# Patient Record
Sex: MALE | Race: OTHER | URBAN - METROPOLITAN AREA
[De-identification: names, ages, dates, MRNs, and addresses within clinical notes are randomized per-mention and may not be internally consistent; named-entity substitution may affect disease eponyms.]

---

## 2023-09-29 ENCOUNTER — TELEPHONE (OUTPATIENT)
Dept: PULMONOLOGY | Age: 63
End: 2023-09-29

## 2023-09-29 NOTE — TELEPHONE ENCOUNTER
Note:    Outside referral from Cindi Hardwick for COPD, chronic hypoxia. Ref states O2 sats in the 80's for months. Unable to afford medications so never filled them. COPD untreated. Needs maging, referral and notes sent to scanning 9/27/23. Urgent sending to triage  I have reviewed referral notes, I do not see a CXR prior in [facility] or Care Everywhere. I have attempted to call patient x 2 and line does not ring. Need to attempt additional calls to patient.

## 2024-03-01 ENCOUNTER — HOSPITAL ENCOUNTER (INPATIENT)
Age: 64
LOS: 6 days | Discharge: HOME HEALTH | DRG: 189 | End: 2024-03-07
Attending: EMERGENCY MEDICINE | Admitting: INTERNAL MEDICINE
Payer: COMMERCIAL

## 2024-03-01 ENCOUNTER — APPOINTMENT (OUTPATIENT)
Dept: GENERAL RADIOLOGY | Age: 64
DRG: 189 | End: 2024-03-01
Payer: COMMERCIAL

## 2024-03-01 DIAGNOSIS — I50.33 ACUTE ON CHRONIC DIASTOLIC CONGESTIVE HEART FAILURE (HCC): ICD-10-CM

## 2024-03-01 DIAGNOSIS — J44.1 CHRONIC OBSTRUCTIVE PULMONARY DISEASE WITH ACUTE EXACERBATION (HCC): ICD-10-CM

## 2024-03-01 DIAGNOSIS — J96.01 ACUTE RESPIRATORY FAILURE WITH HYPOXIA AND HYPERCAPNIA (HCC): Primary | ICD-10-CM

## 2024-03-01 DIAGNOSIS — N17.9 AKI (ACUTE KIDNEY INJURY) (HCC): ICD-10-CM

## 2024-03-01 DIAGNOSIS — J40 BRONCHITIS: ICD-10-CM

## 2024-03-01 DIAGNOSIS — Z91.148 NONCOMPLIANCE WITH MEDICATION REGIMEN: ICD-10-CM

## 2024-03-01 DIAGNOSIS — J96.02 ACUTE RESPIRATORY FAILURE WITH HYPOXIA AND HYPERCAPNIA (HCC): Primary | ICD-10-CM

## 2024-03-01 PROBLEM — I50.9 ACUTE ON CHRONIC HEART FAILURE (HCC): Status: ACTIVE | Noted: 2024-03-01

## 2024-03-01 PROBLEM — G47.33 OSA (OBSTRUCTIVE SLEEP APNEA): Chronic | Status: ACTIVE | Noted: 2024-03-01

## 2024-03-01 PROBLEM — J44.9 COPD (CHRONIC OBSTRUCTIVE PULMONARY DISEASE) (HCC): Chronic | Status: ACTIVE | Noted: 2024-03-01

## 2024-03-01 PROBLEM — E66.9 OBESITY: Chronic | Status: ACTIVE | Noted: 2024-03-01

## 2024-03-01 PROBLEM — Z87.891 HISTORY OF TOBACCO ABUSE: Status: ACTIVE | Noted: 2024-03-01

## 2024-03-01 PROBLEM — D75.1 POLYCYTHEMIA: Chronic | Status: ACTIVE | Noted: 2024-03-01

## 2024-03-01 PROBLEM — R79.89 ELEVATED TROPONIN LEVEL NOT DUE MYOCARDIAL INFARCTION: Status: ACTIVE | Noted: 2024-03-01

## 2024-03-01 LAB
ANION GAP SERPL CALC-SCNC: 2 MMOL/L (ref 2–11)
ARTERIAL PATENCY WRIST A: POSITIVE
ARTERIAL PATENCY WRIST A: POSITIVE
BASE DEFICIT BLD-SCNC: 0.3 MMOL/L
BASE DEFICIT BLD-SCNC: 0.5 MMOL/L
BASOPHILS # BLD: 0.1 K/UL (ref 0–0.2)
BASOPHILS NFR BLD: 0 % (ref 0–2)
BDY SITE: ABNORMAL
BDY SITE: ABNORMAL
BUN SERPL-MCNC: 75 MG/DL (ref 8–23)
CALCIUM SERPL-MCNC: 8.9 MG/DL (ref 8.3–10.4)
CHLORIDE SERPL-SCNC: 100 MMOL/L (ref 103–113)
CO2 SERPL-SCNC: 32 MMOL/L (ref 21–32)
CREAT SERPL-MCNC: 2.1 MG/DL (ref 0.8–1.5)
DIFFERENTIAL METHOD BLD: ABNORMAL
EOSINOPHIL # BLD: 0.1 K/UL (ref 0–0.8)
EOSINOPHIL NFR BLD: 1 % (ref 0.5–7.8)
ERYTHROCYTE [DISTWIDTH] IN BLOOD BY AUTOMATED COUNT: 19.7 % (ref 11.9–14.6)
GAS FLOW.O2 O2 DELIVERY SYS: ABNORMAL
GAS FLOW.O2 O2 DELIVERY SYS: ABNORMAL
GLUCOSE SERPL-MCNC: 109 MG/DL (ref 65–100)
HCO3 BLD-SCNC: 30.1 MMOL/L (ref 22–26)
HCO3 BLD-SCNC: 30.3 MMOL/L (ref 22–26)
HCT VFR BLD AUTO: 61.3 % (ref 41.1–50.3)
HGB BLD-MCNC: 18.4 G/DL (ref 13.6–17.2)
IMM GRANULOCYTES # BLD AUTO: 0.1 K/UL (ref 0–0.5)
IMM GRANULOCYTES NFR BLD AUTO: 1 % (ref 0–5)
INSPIRATION.DURATION SETTING TIME VENT: 0.9 SEC
IPAP/PIP/HIGH PEEP: 19
LACTATE SERPL-SCNC: 1 MMOL/L (ref 0.4–2)
LYMPHOCYTES # BLD: 1.6 K/UL (ref 0.5–4.6)
LYMPHOCYTES NFR BLD: 11 % (ref 13–44)
MAGNESIUM SERPL-MCNC: 2.5 MG/DL (ref 1.8–2.4)
MCH RBC QN AUTO: 28.4 PG (ref 26.1–32.9)
MCHC RBC AUTO-ENTMCNC: 30 G/DL (ref 31.4–35)
MCV RBC AUTO: 94.7 FL (ref 82–102)
MONOCYTES # BLD: 1.4 K/UL (ref 0.1–1.3)
MONOCYTES NFR BLD: 10 % (ref 4–12)
NEUTS SEG # BLD: 10.9 K/UL (ref 1.7–8.2)
NEUTS SEG NFR BLD: 77 % (ref 43–78)
NRBC # BLD: 0.03 K/UL (ref 0–0.2)
NT PRO BNP: 2536 PG/ML (ref 5–125)
O2/TOTAL GAS SETTING VFR VENT: 60 %
O2/TOTAL GAS SETTING VFR VENT: 80 %
PCO2 BLD: 67.8 MMHG (ref 35–45)
PCO2 BLD: 70 MMHG (ref 35–45)
PH BLD: 7.25 (ref 7.35–7.45)
PH BLD: 7.25 (ref 7.35–7.45)
PLATELET # BLD AUTO: 408 K/UL (ref 150–450)
PMV BLD AUTO: 9.3 FL (ref 9.4–12.3)
PO2 BLD: 80 MMHG (ref 75–100)
PO2 BLD: 85 MMHG (ref 75–100)
POTASSIUM SERPL-SCNC: 4.9 MMOL/L (ref 3.5–5.1)
RBC # BLD AUTO: 6.47 M/UL (ref 4.23–5.6)
RESPIRATORY RATE, POC: 20 (ref 5–40)
RESPIRATORY RATE, POC: 22 (ref 5–40)
SAO2 % BLD: 92.7 % (ref 95–98)
SAO2 % BLD: 94.1 % (ref 95–98)
SERVICE CMNT-IMP: ABNORMAL
SODIUM SERPL-SCNC: 134 MMOL/L (ref 136–146)
SPECIMEN TYPE: ABNORMAL
SPECIMEN TYPE: ABNORMAL
TROPONIN I SERPL HS-MCNC: 84.9 PG/ML (ref 0–14)
TROPONIN I SERPL HS-MCNC: 88.1 PG/ML (ref 0–14)
WBC # BLD AUTO: 14.1 K/UL (ref 4.3–11.1)

## 2024-03-01 PROCEDURE — 2000000000 HC ICU R&B

## 2024-03-01 PROCEDURE — 96374 THER/PROPH/DIAG INJ IV PUSH: CPT

## 2024-03-01 PROCEDURE — 94761 N-INVAS EAR/PLS OXIMETRY MLT: CPT

## 2024-03-01 PROCEDURE — 87040 BLOOD CULTURE FOR BACTERIA: CPT

## 2024-03-01 PROCEDURE — 85025 COMPLETE CBC W/AUTO DIFF WBC: CPT

## 2024-03-01 PROCEDURE — 83880 ASSAY OF NATRIURETIC PEPTIDE: CPT

## 2024-03-01 PROCEDURE — 36415 COLL VENOUS BLD VENIPUNCTURE: CPT

## 2024-03-01 PROCEDURE — 94760 N-INVAS EAR/PLS OXIMETRY 1: CPT

## 2024-03-01 PROCEDURE — 83735 ASSAY OF MAGNESIUM: CPT

## 2024-03-01 PROCEDURE — 82803 BLOOD GASES ANY COMBINATION: CPT

## 2024-03-01 PROCEDURE — 5A09457 ASSISTANCE WITH RESPIRATORY VENTILATION, 24-96 CONSECUTIVE HOURS, CONTINUOUS POSITIVE AIRWAY PRESSURE: ICD-10-PCS | Performed by: STUDENT IN AN ORGANIZED HEALTH CARE EDUCATION/TRAINING PROGRAM

## 2024-03-01 PROCEDURE — 84484 ASSAY OF TROPONIN QUANT: CPT

## 2024-03-01 PROCEDURE — 6370000000 HC RX 637 (ALT 250 FOR IP): Performed by: EMERGENCY MEDICINE

## 2024-03-01 PROCEDURE — 83605 ASSAY OF LACTIC ACID: CPT

## 2024-03-01 PROCEDURE — 2580000003 HC RX 258: Performed by: EMERGENCY MEDICINE

## 2024-03-01 PROCEDURE — 0DJ08ZZ INSPECTION OF UPPER INTESTINAL TRACT, VIA NATURAL OR ARTIFICIAL OPENING ENDOSCOPIC: ICD-10-PCS | Performed by: STUDENT IN AN ORGANIZED HEALTH CARE EDUCATION/TRAINING PROGRAM

## 2024-03-01 PROCEDURE — 94640 AIRWAY INHALATION TREATMENT: CPT

## 2024-03-01 PROCEDURE — 6370000000 HC RX 637 (ALT 250 FOR IP): Performed by: STUDENT IN AN ORGANIZED HEALTH CARE EDUCATION/TRAINING PROGRAM

## 2024-03-01 PROCEDURE — 80048 BASIC METABOLIC PNL TOTAL CA: CPT

## 2024-03-01 PROCEDURE — 94660 CPAP INITIATION&MGMT: CPT

## 2024-03-01 PROCEDURE — 99291 CRITICAL CARE FIRST HOUR: CPT | Performed by: INTERNAL MEDICINE

## 2024-03-01 PROCEDURE — 71045 X-RAY EXAM CHEST 1 VIEW: CPT

## 2024-03-01 PROCEDURE — 36600 WITHDRAWAL OF ARTERIAL BLOOD: CPT

## 2024-03-01 PROCEDURE — 99285 EMERGENCY DEPT VISIT HI MDM: CPT

## 2024-03-01 PROCEDURE — 6360000002 HC RX W HCPCS: Performed by: EMERGENCY MEDICINE

## 2024-03-01 PROCEDURE — 2580000003 HC RX 258: Performed by: STUDENT IN AN ORGANIZED HEALTH CARE EDUCATION/TRAINING PROGRAM

## 2024-03-01 PROCEDURE — 93005 ELECTROCARDIOGRAM TRACING: CPT | Performed by: EMERGENCY MEDICINE

## 2024-03-01 PROCEDURE — 6360000002 HC RX W HCPCS: Performed by: STUDENT IN AN ORGANIZED HEALTH CARE EDUCATION/TRAINING PROGRAM

## 2024-03-01 RX ORDER — FUROSEMIDE 10 MG/ML
60 INJECTION INTRAMUSCULAR; INTRAVENOUS ONCE
Status: COMPLETED | OUTPATIENT
Start: 2024-03-01 | End: 2024-03-01

## 2024-03-01 RX ORDER — FUROSEMIDE 10 MG/ML
40 INJECTION INTRAMUSCULAR; INTRAVENOUS 2 TIMES DAILY
Status: DISCONTINUED | OUTPATIENT
Start: 2024-03-02 | End: 2024-03-07 | Stop reason: HOSPADM

## 2024-03-01 RX ORDER — ENOXAPARIN SODIUM 100 MG/ML
40 INJECTION SUBCUTANEOUS 2 TIMES DAILY
Status: DISCONTINUED | OUTPATIENT
Start: 2024-03-01 | End: 2024-03-05

## 2024-03-01 RX ORDER — IPRATROPIUM BROMIDE AND ALBUTEROL SULFATE 2.5; .5 MG/3ML; MG/3ML
1 SOLUTION RESPIRATORY (INHALATION)
Status: COMPLETED | OUTPATIENT
Start: 2024-03-01 | End: 2024-03-01

## 2024-03-01 RX ORDER — AMLODIPINE BESYLATE 5 MG/1
5 TABLET ORAL DAILY
Status: ON HOLD | COMMUNITY
End: 2024-03-06 | Stop reason: HOSPADM

## 2024-03-01 RX ORDER — ASPIRIN 81 MG/1
81 TABLET ORAL DAILY
COMMUNITY

## 2024-03-01 RX ORDER — POLYETHYLENE GLYCOL 3350 17 G/17G
17 POWDER, FOR SOLUTION ORAL DAILY PRN
Status: DISCONTINUED | OUTPATIENT
Start: 2024-03-01 | End: 2024-03-07 | Stop reason: HOSPADM

## 2024-03-01 RX ORDER — SODIUM CHLORIDE 0.9 % (FLUSH) 0.9 %
5-40 SYRINGE (ML) INJECTION PRN
Status: DISCONTINUED | OUTPATIENT
Start: 2024-03-01 | End: 2024-03-07 | Stop reason: HOSPADM

## 2024-03-01 RX ORDER — ONDANSETRON 2 MG/ML
4 INJECTION INTRAMUSCULAR; INTRAVENOUS EVERY 6 HOURS PRN
Status: DISCONTINUED | OUTPATIENT
Start: 2024-03-01 | End: 2024-03-07 | Stop reason: HOSPADM

## 2024-03-01 RX ORDER — ONDANSETRON 4 MG/1
4 TABLET, ORALLY DISINTEGRATING ORAL EVERY 8 HOURS PRN
Status: DISCONTINUED | OUTPATIENT
Start: 2024-03-01 | End: 2024-03-07 | Stop reason: HOSPADM

## 2024-03-01 RX ORDER — BUDESONIDE 0.5 MG/2ML
0.5 INHALANT ORAL
Status: DISCONTINUED | OUTPATIENT
Start: 2024-03-01 | End: 2024-03-06

## 2024-03-01 RX ORDER — IPRATROPIUM BROMIDE AND ALBUTEROL SULFATE 2.5; .5 MG/3ML; MG/3ML
1 SOLUTION RESPIRATORY (INHALATION)
Status: DISCONTINUED | OUTPATIENT
Start: 2024-03-01 | End: 2024-03-04

## 2024-03-01 RX ORDER — SODIUM CHLORIDE 0.9 % (FLUSH) 0.9 %
5-40 SYRINGE (ML) INJECTION EVERY 12 HOURS SCHEDULED
Status: DISCONTINUED | OUTPATIENT
Start: 2024-03-01 | End: 2024-03-07 | Stop reason: HOSPADM

## 2024-03-01 RX ORDER — ACETAMINOPHEN 325 MG/1
650 TABLET ORAL EVERY 6 HOURS PRN
Status: DISCONTINUED | OUTPATIENT
Start: 2024-03-01 | End: 2024-03-07 | Stop reason: HOSPADM

## 2024-03-01 RX ORDER — ACETAMINOPHEN 650 MG/1
650 SUPPOSITORY RECTAL EVERY 6 HOURS PRN
Status: DISCONTINUED | OUTPATIENT
Start: 2024-03-01 | End: 2024-03-07 | Stop reason: HOSPADM

## 2024-03-01 RX ORDER — LOSARTAN POTASSIUM AND HYDROCHLOROTHIAZIDE 12.5; 5 MG/1; MG/1
1 TABLET ORAL DAILY
Status: ON HOLD | COMMUNITY
End: 2024-03-06 | Stop reason: HOSPADM

## 2024-03-01 RX ORDER — ASPIRIN 81 MG/1
324 TABLET, CHEWABLE ORAL ONCE
Status: COMPLETED | OUTPATIENT
Start: 2024-03-01 | End: 2024-03-01

## 2024-03-01 RX ORDER — POTASSIUM CHLORIDE 7.45 MG/ML
10 INJECTION INTRAVENOUS PRN
Status: DISCONTINUED | OUTPATIENT
Start: 2024-03-01 | End: 2024-03-05

## 2024-03-01 RX ORDER — MAGNESIUM SULFATE IN WATER 40 MG/ML
2000 INJECTION, SOLUTION INTRAVENOUS PRN
Status: DISCONTINUED | OUTPATIENT
Start: 2024-03-01 | End: 2024-03-05

## 2024-03-01 RX ORDER — POTASSIUM CHLORIDE 29.8 MG/ML
20 INJECTION INTRAVENOUS PRN
Status: DISCONTINUED | OUTPATIENT
Start: 2024-03-01 | End: 2024-03-05

## 2024-03-01 RX ADMIN — FUROSEMIDE 60 MG: 10 INJECTION, SOLUTION INTRAMUSCULAR; INTRAVENOUS at 16:42

## 2024-03-01 RX ADMIN — IPRATROPIUM BROMIDE AND ALBUTEROL SULFATE 1 DOSE: 2.5; .5 SOLUTION RESPIRATORY (INHALATION) at 20:02

## 2024-03-01 RX ADMIN — WATER 125 MG: 1 INJECTION INTRAMUSCULAR; INTRAVENOUS; SUBCUTANEOUS at 14:50

## 2024-03-01 RX ADMIN — WATER 1000 MG: 1 INJECTION INTRAMUSCULAR; INTRAVENOUS; SUBCUTANEOUS at 16:42

## 2024-03-01 RX ADMIN — IPRATROPIUM BROMIDE AND ALBUTEROL SULFATE 1 DOSE: 2.5; .5 SOLUTION RESPIRATORY (INHALATION) at 14:47

## 2024-03-01 RX ADMIN — ENOXAPARIN SODIUM 40 MG: 100 INJECTION SUBCUTANEOUS at 20:38

## 2024-03-01 RX ADMIN — SODIUM CHLORIDE, PRESERVATIVE FREE 10 ML: 5 INJECTION INTRAVENOUS at 20:39

## 2024-03-01 RX ADMIN — ASPIRIN 81 MG 324 MG: 81 TABLET ORAL at 14:48

## 2024-03-01 ASSESSMENT — PAIN SCALES - GENERAL
PAINLEVEL_OUTOF10: 0
PAINLEVEL_OUTOF10: 0

## 2024-03-01 ASSESSMENT — LIFESTYLE VARIABLES
HOW MANY STANDARD DRINKS CONTAINING ALCOHOL DO YOU HAVE ON A TYPICAL DAY: PATIENT DOES NOT DRINK
HOW OFTEN DO YOU HAVE A DRINK CONTAINING ALCOHOL: NEVER

## 2024-03-01 NOTE — H&P
HISTORY AND PHYSICAL  Tay Conklin  3/1/2024   Date of Admission:  3/1/2024    The patient's chart is reviewed and the patient is discussed with the staff.    Subjective:     Patient is a 63 y.o. male presents to ER via EMS from home with shortness of breath, edema, and altered mental status. He has a PMH of COPD, has not been treated and was supposed to see us as a new patient in January but did not show for appointment. He states he quit smoking 12 years ago and recently stopped drinking. He tells me he has been seeing [facility] and is supposed to be seeing pulmonary and cardiology. Has had CPAP to wear nightly for about 2 years but does not like it. He remains on BIPAP and is not consistent with providing history. Significant other and daughter at bedside. He states he was put on a couple different inhalers but they are very expensive.     Was started on Airvo but transitioned to BIPAP due to ABG with pH 7.25 and pCO2 70. Polycythemia seen on CBC, Hgb 18.4 likely chronically hypoxic. Significant other states his O2 sats are in the 60s at home sometimes and he is always sleepy. Reports a +20 lb weight gain in a month timeframe. He has anasarca with edema in BLE, scrotum, abdomen, face, and orbital. Significant other states he has had trouble urinating due to swelling in scrotum. proBNP was 2536. Significant other states he does have a history of CHF, no ECHO for review. Minimal documented history in EPIC for review.     Review of Systems:  Comprehensive ROS negative except in HPI    No current outpatient medications   No past medical history on file.  No past surgical history on file.  Social History     Socioeconomic History    Marital status:      Spouse name: Not on file    Number of children: Not on file    Years of education: Not on file    Highest education level: Not on file   Occupational History    Not on file

## 2024-03-01 NOTE — ED NOTES
Pt arrives via [facility] coming from home c/o SOB, edema, ascites that started in 1960 per patient.      Dong Lawrence, SHAWN  03/01/24 1474

## 2024-03-01 NOTE — ED PROVIDER NOTES
Emergency Department Provider Note       PCP: Malu Shaw   Age: 63 y.o.   Sex: male     DISPOSITION Admitted 03/01/2024 03:50:55 PM       ICD-10-CM    1. Acute respiratory failure with hypoxia and hypercapnia (HCC)  J96.01 Echo (TTE) complete (PRN contrast/bubble/strain/3D)    J96.02       2. Bronchitis  J40       3. DONYA (acute kidney injury) (HCC)  N17.9       4. Acute on chronic diastolic congestive heart failure (HCC)  I50.33 Echo (TTE) complete (PRN contrast/bubble/strain/3D)      5. Noncompliance with medication regimen  Z91.148           Medical Decision Making     DDX:    Acute exacerbation asthma, COPD, CHF, COVID-19, influenza    Bronchitis, aspiration pneumonia, pneumonia,    PE, rib fracture, rib dysfunction, pleurisy, pneumothorax, aspiration of foreign body   ED Course as of 03/01/24 1653   Fri Mar 01, 2024   1538 Dr. Boyer with the pulmonary intensivist group was consulted and will come and see the patient for his admission to the hospital secondary to hypoxic respiratory failure [KH]      ED Course User Index  [KH] Juan Aguirrestephen PEÑA, DO     1 or more acute illnesses that pose a threat to life or bodily function.   Shared medical decision making was utilized in creating the patients health plan today.    I independently ordered and reviewed each unique test.  I reviewed external records: provider visit note from PCP.  I reviewed external records: provider visit note from outside specialist.  I reviewed external records: previous EKG including cardiologist interpretation.    I reviewed external records: previous lab results from outside ED.  I reviewed external records: previous imaging study including radiologist interpretation.     I interpreted the X-rays no.  The patient was admitted and I have discussed patient management with the admitting provider.          History     Patient is a 63-year-old male presenting to the emerged part today complaining of somnolence and shortness of breath

## 2024-03-02 ENCOUNTER — APPOINTMENT (OUTPATIENT)
Dept: NON INVASIVE DIAGNOSTICS | Age: 64
DRG: 189 | End: 2024-03-02
Payer: COMMERCIAL

## 2024-03-02 LAB
ALBUMIN SERPL-MCNC: 3.1 G/DL (ref 3.2–4.6)
ALBUMIN/GLOB SERPL: 0.9 (ref 0.4–1.6)
ALP SERPL-CCNC: 63 U/L (ref 50–136)
ALT SERPL-CCNC: 54 U/L (ref 12–65)
ANION GAP SERPL CALC-SCNC: 6 MMOL/L (ref 2–11)
ARTERIAL PATENCY WRIST A: POSITIVE
AST SERPL-CCNC: 25 U/L (ref 15–37)
BASE EXCESS BLD CALC-SCNC: 1.5 MMOL/L
BASOPHILS # BLD: 0 K/UL (ref 0–0.2)
BASOPHILS NFR BLD: 0 % (ref 0–2)
BDY SITE: ABNORMAL
BILIRUB DIRECT SERPL-MCNC: 0.4 MG/DL
BILIRUB SERPL-MCNC: 1.2 MG/DL (ref 0.2–1.1)
BUN SERPL-MCNC: 68 MG/DL (ref 8–23)
CALCIUM SERPL-MCNC: 9 MG/DL (ref 8.3–10.4)
CHLORIDE SERPL-SCNC: 100 MMOL/L (ref 103–113)
CO2 SERPL-SCNC: 29 MMOL/L (ref 21–32)
CREAT SERPL-MCNC: 1.8 MG/DL (ref 0.8–1.5)
DIFFERENTIAL METHOD BLD: ABNORMAL
ECHO AV AREA PEAK VELOCITY: 2.9 CM2
ECHO AV AREA VTI: 3 CM2
ECHO AV AREA/BSA PEAK VELOCITY: 1 CM2/M2
ECHO AV AREA/BSA VTI: 1.1 CM2/M2
ECHO AV MEAN GRADIENT: 7 MMHG
ECHO AV MEAN VELOCITY: 1.2 M/S
ECHO AV PEAK GRADIENT: 12 MMHG
ECHO AV PEAK VELOCITY: 1.7 M/S
ECHO AV VELOCITY RATIO: 0.65
ECHO AV VTI: 28.6 CM
ECHO BSA: 2.91 M2
ECHO EST RA PRESSURE: 15 MMHG
ECHO IVC PROX: 3.4 CM
ECHO LA AREA 2C: 22.4 CM2
ECHO LA AREA 4C: 24.5 CM2
ECHO LA DIAMETER INDEX: 1.37 CM/M2
ECHO LA DIAMETER: 3.8 CM
ECHO LA MAJOR AXIS: 6.4 CM
ECHO LA MINOR AXIS: 5.8 CM
ECHO LA VOL BP: 75 ML (ref 18–58)
ECHO LA VOL MOD A2C: 70 ML (ref 18–58)
ECHO LA VOL MOD A4C: 74 ML (ref 18–58)
ECHO LA VOL/BSA BIPLANE: 27 ML/M2 (ref 16–34)
ECHO LA VOLUME INDEX MOD A2C: 25 ML/M2 (ref 16–34)
ECHO LA VOLUME INDEX MOD A4C: 27 ML/M2 (ref 16–34)
ECHO LV E' LATERAL VELOCITY: 13 CM/S
ECHO LV E' SEPTAL VELOCITY: 8 CM/S
ECHO LV EDV A4C: 146 ML
ECHO LV EDV INDEX A4C: 53 ML/M2
ECHO LV EJECTION FRACTION A4C: 58 %
ECHO LV ESV A4C: 61 ML
ECHO LV ESV INDEX A4C: 22 ML/M2
ECHO LV FRACTIONAL SHORTENING: 29 % (ref 28–44)
ECHO LV INTERNAL DIMENSION DIASTOLE INDEX: 2.12 CM/M2
ECHO LV INTERNAL DIMENSION DIASTOLIC: 5.9 CM (ref 4.2–5.9)
ECHO LV INTERNAL DIMENSION SYSTOLIC INDEX: 1.51 CM/M2
ECHO LV INTERNAL DIMENSION SYSTOLIC: 4.2 CM
ECHO LV IVSD: 0.9 CM (ref 0.6–1)
ECHO LV MASS 2D: 239.9 G (ref 88–224)
ECHO LV MASS INDEX 2D: 86.3 G/M2 (ref 49–115)
ECHO LV POSTERIOR WALL DIASTOLIC: 1.1 CM (ref 0.6–1)
ECHO LV RELATIVE WALL THICKNESS RATIO: 0.37
ECHO LVOT AREA: 4.5 CM2
ECHO LVOT AV VTI INDEX: 0.66
ECHO LVOT DIAM: 2.4 CM
ECHO LVOT MEAN GRADIENT: 3 MMHG
ECHO LVOT PEAK GRADIENT: 5 MMHG
ECHO LVOT PEAK VELOCITY: 1.1 M/S
ECHO LVOT STROKE VOLUME INDEX: 30.9 ML/M2
ECHO LVOT SV: 85.9 ML
ECHO LVOT VTI: 19 CM
ECHO MV A VELOCITY: 0.59 M/S
ECHO MV E DECELERATION TIME (DT): 190 MS
ECHO MV E VELOCITY: 0.56 M/S
ECHO MV E/A RATIO: 0.95
ECHO MV E/E' LATERAL: 4.31
ECHO MV E/E' RATIO (AVERAGED): 5.65
ECHO MV REGURGITANT PEAK GRADIENT: 108 MMHG
ECHO MV REGURGITANT PEAK VELOCITY: 5.2 M/S
ECHO PV MAX VELOCITY: 1.1 M/S
ECHO PV PEAK GRADIENT: 5 MMHG
ECHO RIGHT VENTRICULAR SYSTOLIC PRESSURE (RVSP): 75 MMHG
ECHO RV BASAL DIMENSION: 5.6 CM
ECHO TV REGURGITANT MAX VELOCITY: 3.86 M/S
ECHO TV REGURGITANT PEAK GRADIENT: 60 MMHG
EKG ATRIAL RATE: 91 BPM
EKG DIAGNOSIS: NORMAL
EKG P AXIS: 68 DEGREES
EKG P-R INTERVAL: 178 MS
EKG Q-T INTERVAL: 371 MS
EKG QRS DURATION: 111 MS
EKG QTC CALCULATION (BAZETT): 457 MS
EKG R AXIS: 94 DEGREES
EKG T AXIS: 55 DEGREES
EKG VENTRICULAR RATE: 91 BPM
EOSINOPHIL # BLD: 0 K/UL (ref 0–0.8)
EOSINOPHIL NFR BLD: 0 % (ref 0.5–7.8)
ERYTHROCYTE [DISTWIDTH] IN BLOOD BY AUTOMATED COUNT: 19.9 % (ref 11.9–14.6)
GAS FLOW.O2 O2 DELIVERY SYS: ABNORMAL
GLOBULIN SER CALC-MCNC: 3.5 G/DL (ref 2.8–4.5)
GLUCOSE SERPL-MCNC: 121 MG/DL (ref 65–100)
HCO3 BLD-SCNC: 33.9 MMOL/L (ref 22–26)
HCT VFR BLD AUTO: 65.4 % (ref 41.1–50.3)
HGB BLD-MCNC: 19.5 G/DL (ref 13.6–17.2)
IMM GRANULOCYTES # BLD AUTO: 0.1 K/UL (ref 0–0.5)
IMM GRANULOCYTES NFR BLD AUTO: 1 % (ref 0–5)
LYMPHOCYTES # BLD: 0.8 K/UL (ref 0.5–4.6)
LYMPHOCYTES NFR BLD: 9 % (ref 13–44)
MAGNESIUM SERPL-MCNC: 2.4 MG/DL (ref 1.8–2.4)
MCH RBC QN AUTO: 28.3 PG (ref 26.1–32.9)
MCHC RBC AUTO-ENTMCNC: 29.8 G/DL (ref 31.4–35)
MCV RBC AUTO: 95.1 FL (ref 82–102)
MONOCYTES # BLD: 0.6 K/UL (ref 0.1–1.3)
MONOCYTES NFR BLD: 6 % (ref 4–12)
NEUTS SEG # BLD: 8.2 K/UL (ref 1.7–8.2)
NEUTS SEG NFR BLD: 84 % (ref 43–78)
NRBC # BLD: 0.02 K/UL (ref 0–0.2)
O2/TOTAL GAS SETTING VFR VENT: 60 %
PCO2 BLD: 80.9 MMHG (ref 35–45)
PEEP RESPIRATORY: 10 CMH2O
PH BLD: 7.23 (ref 7.35–7.45)
PLATELET # BLD AUTO: 356 K/UL (ref 150–450)
PMV BLD AUTO: 9.2 FL (ref 9.4–12.3)
PO2 BLD: 76 MMHG (ref 75–100)
POTASSIUM SERPL-SCNC: 5 MMOL/L (ref 3.5–5.1)
PRESSURE SUPPORT SETTING VENT: 20 CMH2O
PROT SERPL-MCNC: 6.6 G/DL (ref 6.3–8.2)
RBC # BLD AUTO: 6.88 M/UL (ref 4.23–5.6)
RESPIRATORY RATE, POC: 22 (ref 5–40)
SAO2 % BLD: 91.2 % (ref 95–98)
SERVICE CMNT-IMP: ABNORMAL
SODIUM SERPL-SCNC: 135 MMOL/L (ref 136–146)
SPECIMEN TYPE: ABNORMAL
VENTILATION MODE VENT: ABNORMAL
WBC # BLD AUTO: 9.7 K/UL (ref 4.3–11.1)

## 2024-03-02 PROCEDURE — 6360000004 HC RX CONTRAST MEDICATION: Performed by: STUDENT IN AN ORGANIZED HEALTH CARE EDUCATION/TRAINING PROGRAM

## 2024-03-02 PROCEDURE — 94761 N-INVAS EAR/PLS OXIMETRY MLT: CPT

## 2024-03-02 PROCEDURE — 6360000002 HC RX W HCPCS: Performed by: STUDENT IN AN ORGANIZED HEALTH CARE EDUCATION/TRAINING PROGRAM

## 2024-03-02 PROCEDURE — 36600 WITHDRAWAL OF ARTERIAL BLOOD: CPT

## 2024-03-02 PROCEDURE — 85025 COMPLETE CBC W/AUTO DIFF WBC: CPT

## 2024-03-02 PROCEDURE — 94660 CPAP INITIATION&MGMT: CPT

## 2024-03-02 PROCEDURE — 83735 ASSAY OF MAGNESIUM: CPT

## 2024-03-02 PROCEDURE — 1100000003 HC PRIVATE W/ TELEMETRY

## 2024-03-02 PROCEDURE — 2000000000 HC ICU R&B

## 2024-03-02 PROCEDURE — 99291 CRITICAL CARE FIRST HOUR: CPT | Performed by: INTERNAL MEDICINE

## 2024-03-02 PROCEDURE — 80048 BASIC METABOLIC PNL TOTAL CA: CPT

## 2024-03-02 PROCEDURE — 36415 COLL VENOUS BLD VENIPUNCTURE: CPT

## 2024-03-02 PROCEDURE — 80076 HEPATIC FUNCTION PANEL: CPT

## 2024-03-02 PROCEDURE — C8929 TTE W OR WO FOL WCON,DOPPLER: HCPCS

## 2024-03-02 PROCEDURE — 2580000003 HC RX 258: Performed by: STUDENT IN AN ORGANIZED HEALTH CARE EDUCATION/TRAINING PROGRAM

## 2024-03-02 PROCEDURE — 93010 ELECTROCARDIOGRAM REPORT: CPT | Performed by: INTERNAL MEDICINE

## 2024-03-02 PROCEDURE — 82803 BLOOD GASES ANY COMBINATION: CPT

## 2024-03-02 PROCEDURE — C9113 INJ PANTOPRAZOLE SODIUM, VIA: HCPCS | Performed by: STUDENT IN AN ORGANIZED HEALTH CARE EDUCATION/TRAINING PROGRAM

## 2024-03-02 PROCEDURE — 2700000000 HC OXYGEN THERAPY PER DAY

## 2024-03-02 PROCEDURE — A4216 STERILE WATER/SALINE, 10 ML: HCPCS | Performed by: STUDENT IN AN ORGANIZED HEALTH CARE EDUCATION/TRAINING PROGRAM

## 2024-03-02 PROCEDURE — 94640 AIRWAY INHALATION TREATMENT: CPT

## 2024-03-02 PROCEDURE — 6370000000 HC RX 637 (ALT 250 FOR IP): Performed by: STUDENT IN AN ORGANIZED HEALTH CARE EDUCATION/TRAINING PROGRAM

## 2024-03-02 RX ADMIN — IPRATROPIUM BROMIDE AND ALBUTEROL SULFATE 1 DOSE: 2.5; .5 SOLUTION RESPIRATORY (INHALATION) at 07:00

## 2024-03-02 RX ADMIN — IPRATROPIUM BROMIDE AND ALBUTEROL SULFATE 1 DOSE: 2.5; .5 SOLUTION RESPIRATORY (INHALATION) at 11:53

## 2024-03-02 RX ADMIN — SODIUM CHLORIDE, PRESERVATIVE FREE 10 ML: 5 INJECTION INTRAVENOUS at 21:08

## 2024-03-02 RX ADMIN — BUDESONIDE 500 MCG: 0.5 INHALANT RESPIRATORY (INHALATION) at 07:59

## 2024-03-02 RX ADMIN — ENOXAPARIN SODIUM 40 MG: 100 INJECTION SUBCUTANEOUS at 09:21

## 2024-03-02 RX ADMIN — IPRATROPIUM BROMIDE AND ALBUTEROL SULFATE 1 DOSE: 2.5; .5 SOLUTION RESPIRATORY (INHALATION) at 19:42

## 2024-03-02 RX ADMIN — IPRATROPIUM BROMIDE AND ALBUTEROL SULFATE 1 DOSE: 2.5; .5 SOLUTION RESPIRATORY (INHALATION) at 00:12

## 2024-03-02 RX ADMIN — PANTOPRAZOLE SODIUM 40 MG: 40 INJECTION, POWDER, FOR SOLUTION INTRAVENOUS at 09:21

## 2024-03-02 RX ADMIN — IPRATROPIUM BROMIDE AND ALBUTEROL SULFATE 1 DOSE: 2.5; .5 SOLUTION RESPIRATORY (INHALATION) at 03:19

## 2024-03-02 RX ADMIN — FUROSEMIDE 40 MG: 10 INJECTION, SOLUTION INTRAMUSCULAR; INTRAVENOUS at 09:21

## 2024-03-02 RX ADMIN — WATER 40 MG: 1 INJECTION INTRAMUSCULAR; INTRAVENOUS; SUBCUTANEOUS at 18:05

## 2024-03-02 RX ADMIN — SODIUM CHLORIDE, PRESERVATIVE FREE 10 ML: 5 INJECTION INTRAVENOUS at 09:22

## 2024-03-02 RX ADMIN — SODIUM CHLORIDE, PRESERVATIVE FREE 0.6 ML: 5 INJECTION INTRAVENOUS at 09:25

## 2024-03-02 RX ADMIN — WATER 40 MG: 1 INJECTION INTRAMUSCULAR; INTRAVENOUS; SUBCUTANEOUS at 03:14

## 2024-03-02 RX ADMIN — IPRATROPIUM BROMIDE AND ALBUTEROL SULFATE 1 DOSE: 2.5; .5 SOLUTION RESPIRATORY (INHALATION) at 15:59

## 2024-03-02 RX ADMIN — BUDESONIDE 500 MCG: 0.5 INHALANT RESPIRATORY (INHALATION) at 19:42

## 2024-03-02 RX ADMIN — ENOXAPARIN SODIUM 40 MG: 100 INJECTION SUBCUTANEOUS at 21:08

## 2024-03-02 RX ADMIN — FUROSEMIDE 40 MG: 10 INJECTION, SOLUTION INTRAMUSCULAR; INTRAVENOUS at 18:06

## 2024-03-02 ASSESSMENT — PAIN SCALES - GENERAL
PAINLEVEL_OUTOF10: 0

## 2024-03-03 ENCOUNTER — APPOINTMENT (OUTPATIENT)
Dept: GENERAL RADIOLOGY | Age: 64
DRG: 189 | End: 2024-03-03
Payer: COMMERCIAL

## 2024-03-03 PROBLEM — K92.1 MELENA: Status: ACTIVE | Noted: 2024-03-03

## 2024-03-03 LAB
ANION GAP SERPL CALC-SCNC: 6 MMOL/L (ref 2–11)
BUN SERPL-MCNC: 63 MG/DL (ref 8–23)
CALCIUM SERPL-MCNC: 8.9 MG/DL (ref 8.3–10.4)
CHLORIDE SERPL-SCNC: 101 MMOL/L (ref 103–113)
CO2 SERPL-SCNC: 35 MMOL/L (ref 21–32)
FERRITIN SERPL-MCNC: 19 NG/ML (ref 8–388)
GLUCOSE SERPL-MCNC: 125 MG/DL (ref 65–100)
HCT VFR BLD AUTO: 39.5 % (ref 41.1–50.3)
HCT VFR BLD AUTO: 52.1 % (ref 41.1–50.3)
HEMOCCULT STL QL: POSITIVE
HGB BLD-MCNC: 12 G/DL (ref 13.6–17.2)
HGB BLD-MCNC: 15.9 G/DL (ref 13.6–17.2)
IRON SATN MFR SERPL: 6 %
IRON SERPL-MCNC: 26 UG/DL (ref 35–150)
MAGNESIUM SERPL-MCNC: 2.2 MG/DL (ref 1.8–2.4)
POTASSIUM SERPL-SCNC: 4.2 MMOL/L (ref 3.5–5.1)
SODIUM SERPL-SCNC: 142 MMOL/L (ref 136–146)
TIBC SERPL-MCNC: 417 UG/DL (ref 250–450)

## 2024-03-03 PROCEDURE — 6370000000 HC RX 637 (ALT 250 FOR IP): Performed by: INTERNAL MEDICINE

## 2024-03-03 PROCEDURE — 86850 RBC ANTIBODY SCREEN: CPT

## 2024-03-03 PROCEDURE — 85014 HEMATOCRIT: CPT

## 2024-03-03 PROCEDURE — 99233 SBSQ HOSP IP/OBS HIGH 50: CPT | Performed by: INTERNAL MEDICINE

## 2024-03-03 PROCEDURE — 2700000000 HC OXYGEN THERAPY PER DAY

## 2024-03-03 PROCEDURE — 71045 X-RAY EXAM CHEST 1 VIEW: CPT

## 2024-03-03 PROCEDURE — 36415 COLL VENOUS BLD VENIPUNCTURE: CPT

## 2024-03-03 PROCEDURE — A4216 STERILE WATER/SALINE, 10 ML: HCPCS | Performed by: FAMILY MEDICINE

## 2024-03-03 PROCEDURE — 83540 ASSAY OF IRON: CPT

## 2024-03-03 PROCEDURE — C9113 INJ PANTOPRAZOLE SODIUM, VIA: HCPCS | Performed by: STUDENT IN AN ORGANIZED HEALTH CARE EDUCATION/TRAINING PROGRAM

## 2024-03-03 PROCEDURE — 83735 ASSAY OF MAGNESIUM: CPT

## 2024-03-03 PROCEDURE — 94660 CPAP INITIATION&MGMT: CPT

## 2024-03-03 PROCEDURE — 86901 BLOOD TYPING SEROLOGIC RH(D): CPT

## 2024-03-03 PROCEDURE — 86900 BLOOD TYPING SEROLOGIC ABO: CPT

## 2024-03-03 PROCEDURE — 6360000002 HC RX W HCPCS: Performed by: FAMILY MEDICINE

## 2024-03-03 PROCEDURE — 80048 BASIC METABOLIC PNL TOTAL CA: CPT

## 2024-03-03 PROCEDURE — 6360000002 HC RX W HCPCS: Performed by: STUDENT IN AN ORGANIZED HEALTH CARE EDUCATION/TRAINING PROGRAM

## 2024-03-03 PROCEDURE — C9113 INJ PANTOPRAZOLE SODIUM, VIA: HCPCS | Performed by: FAMILY MEDICINE

## 2024-03-03 PROCEDURE — A4216 STERILE WATER/SALINE, 10 ML: HCPCS | Performed by: STUDENT IN AN ORGANIZED HEALTH CARE EDUCATION/TRAINING PROGRAM

## 2024-03-03 PROCEDURE — 2580000003 HC RX 258: Performed by: FAMILY MEDICINE

## 2024-03-03 PROCEDURE — 2580000003 HC RX 258: Performed by: STUDENT IN AN ORGANIZED HEALTH CARE EDUCATION/TRAINING PROGRAM

## 2024-03-03 PROCEDURE — 1100000003 HC PRIVATE W/ TELEMETRY

## 2024-03-03 PROCEDURE — 86923 COMPATIBILITY TEST ELECTRIC: CPT

## 2024-03-03 PROCEDURE — 83550 IRON BINDING TEST: CPT

## 2024-03-03 PROCEDURE — 82272 OCCULT BLD FECES 1-3 TESTS: CPT

## 2024-03-03 PROCEDURE — 82728 ASSAY OF FERRITIN: CPT

## 2024-03-03 PROCEDURE — 94640 AIRWAY INHALATION TREATMENT: CPT

## 2024-03-03 PROCEDURE — 85018 HEMOGLOBIN: CPT

## 2024-03-03 PROCEDURE — 6370000000 HC RX 637 (ALT 250 FOR IP): Performed by: STUDENT IN AN ORGANIZED HEALTH CARE EDUCATION/TRAINING PROGRAM

## 2024-03-03 PROCEDURE — 94761 N-INVAS EAR/PLS OXIMETRY MLT: CPT

## 2024-03-03 RX ORDER — FLUTICASONE PROPIONATE 50 MCG
2 SPRAY, SUSPENSION (ML) NASAL DAILY
Status: DISCONTINUED | OUTPATIENT
Start: 2024-03-03 | End: 2024-03-07 | Stop reason: HOSPADM

## 2024-03-03 RX ADMIN — SODIUM CHLORIDE 40 MG: 9 INJECTION INTRAMUSCULAR; INTRAVENOUS; SUBCUTANEOUS at 20:52

## 2024-03-03 RX ADMIN — FLUTICASONE PROPIONATE 2 SPRAY: 50 SPRAY, METERED NASAL at 11:48

## 2024-03-03 RX ADMIN — ENOXAPARIN SODIUM 40 MG: 100 INJECTION SUBCUTANEOUS at 10:22

## 2024-03-03 RX ADMIN — IPRATROPIUM BROMIDE AND ALBUTEROL SULFATE 1 DOSE: 2.5; .5 SOLUTION RESPIRATORY (INHALATION) at 04:30

## 2024-03-03 RX ADMIN — IPRATROPIUM BROMIDE AND ALBUTEROL SULFATE 1 DOSE: 2.5; .5 SOLUTION RESPIRATORY (INHALATION) at 07:30

## 2024-03-03 RX ADMIN — WATER 40 MG: 1 INJECTION INTRAMUSCULAR; INTRAVENOUS; SUBCUTANEOUS at 04:29

## 2024-03-03 RX ADMIN — SODIUM CHLORIDE, PRESERVATIVE FREE 10 ML: 5 INJECTION INTRAVENOUS at 20:55

## 2024-03-03 RX ADMIN — WATER 40 MG: 1 INJECTION INTRAMUSCULAR; INTRAVENOUS; SUBCUTANEOUS at 16:23

## 2024-03-03 RX ADMIN — BUDESONIDE 500 MCG: 0.5 INHALANT RESPIRATORY (INHALATION) at 19:40

## 2024-03-03 RX ADMIN — BUDESONIDE 500 MCG: 0.5 INHALANT RESPIRATORY (INHALATION) at 07:31

## 2024-03-03 RX ADMIN — IPRATROPIUM BROMIDE AND ALBUTEROL SULFATE 1 DOSE: 2.5; .5 SOLUTION RESPIRATORY (INHALATION) at 11:31

## 2024-03-03 RX ADMIN — PANTOPRAZOLE SODIUM 40 MG: 40 INJECTION, POWDER, FOR SOLUTION INTRAVENOUS at 10:22

## 2024-03-03 RX ADMIN — FUROSEMIDE 40 MG: 10 INJECTION, SOLUTION INTRAMUSCULAR; INTRAVENOUS at 10:22

## 2024-03-03 RX ADMIN — IPRATROPIUM BROMIDE AND ALBUTEROL SULFATE 1 DOSE: 2.5; .5 SOLUTION RESPIRATORY (INHALATION) at 19:39

## 2024-03-03 RX ADMIN — IPRATROPIUM BROMIDE AND ALBUTEROL SULFATE 1 DOSE: 2.5; .5 SOLUTION RESPIRATORY (INHALATION) at 00:05

## 2024-03-03 RX ADMIN — SODIUM CHLORIDE, PRESERVATIVE FREE 10 ML: 5 INJECTION INTRAVENOUS at 10:20

## 2024-03-03 ASSESSMENT — PAIN SCALES - GENERAL: PAINLEVEL_OUTOF10: 0

## 2024-03-03 NOTE — CONSULTS
[facility] Hospitalist Consult   Admit Date:  3/1/2024  2:16 PM   Name:  Tay Conklin   Age:  63 y.o.  Sex:  male  :  1960   MRN:  [id number]   Room:  [facility]    Presenting/Chief Complaint: Shortness of Breath, Leg Swelling, and ascites    Reason(s) for Admission: Bronchitis [J40]  Noncompliance with medication regimen [Z91.148]  DONYA (acute kidney injury) (HCC) [N17.9]  Acute on chronic diastolic congestive heart failure (HCC) [I50.33]  Acute respiratory failure with hypoxia and hypercapnia (HCC) [J96.01, J96.02]     Hospitalists consulted by Rocio Bates DO for: Assume care, melena    History of Presenting Illness:   Tay Conklin is a 63 y.o. male who was admitted on 3/1 for hypercapnic respiratory failure due to untreated COPD and non compliance with CPAP. Currently on airvo 50 L 48%.      Hx of COPD, LILA on CPAP, pulmonary HTN, umbilical hernia.     Today, noted to have dark watery stool X 2. Denies hx of PUD. Has been on lovenox during hospital stay. States he had a colonoscopy 1 year with no significant findings.   Assessment & Plan:     Principal Problem:    Acute respiratory failure with hypoxia and hypercapnia (HCC)  Plan: per pulmonology  Active Problems:    Acute on chronic heart failure (HCC)  Plan: on lasix 40mg BID    LILA (obstructive sleep apnea)  Plan: CPAP    Polycythemia  Plan: likely due to chronic hypoxia. S/p phlebotomy while here       Acute kidney injury (HCC)  Plan: trending down.     Melena- Stool occult pending. H&H we will trend. Already on remote tele. Clears today and NPO past midnight. PPI IV BID (his BUN is elevated). Consult GI to see in AM. Type and screen. Hold lovenox. Due to acute volume loss, I will also hold lasix dose tonight       Anticipated Discharge Arrangements:   Home    PT/OT evals and PPD needed/ordered?  Not ordered; patient not expected to need rehab  Diet:  ADULT DIET; Clear Liquid  Diet NPO Exceptions are: Ice Chips, Sips of Water with Meds  VTE

## 2024-03-03 NOTE — ICUWATCH
RRT Clinical Rounding Nurse Update    Vitals:    03/03/24 0733 03/03/24 0756 03/03/24 1131 03/03/24 1213   BP:  126/81  121/74   Pulse: 97 94 (!) 102 95   Resp: 18 20 22 18   Temp:  98.1 °F (36.7 °C)  98.1 °F (36.7 °C)   TempSrc:  Oral  Oral   SpO2: 92% 92% 95%    Weight:       Height:            DETERIORATION INDEX SCORE: 30    ASSESSMENT:  Previous outreach assessment was reviewed. There have been no significant changes since previous assessment.    PLAN:  Will follow per RRT Clinical Rounding Program protocol.    Marquis Henriquez RN  [place]: [phone]  [place]: [phone]

## 2024-03-03 NOTE — ICUWATCH
RRT Clinical Rounding Nurse Progress Report      SUBJECTIVE: Patient assessed secondary to transfer from critical care.      Vitals:    03/03/24 0557 03/03/24 0630 03/03/24 0733 03/03/24 0756   BP:    126/81   Pulse: 88  97 94   Resp: 17  18 20   Temp:    98.1 °F (36.7 °C)   TempSrc:    Oral   SpO2: 91%  92% 92%   Weight:  (!) 159.8 kg (352 lb 6.4 oz)     Height:            DETERIORATION INDEX SCORE: 30     ASSESSMENT:  Pt awake in chair, oriented x4.  On Airvo 50L, 50%, lung fields clear bilaterally. Pt states he has less dyspnea today.  Gave pt incentive spirometer and instructions for use.  Pertinent labs, images, and notes reviewed. Case discussed with primary RN.    PLAN:  Will follow per RRT Clinical Rounding Program protocol.    Marquis Henriquez RN  [facility]: [phone]  [facility]: [phone]

## 2024-03-03 NOTE — PLAN OF CARE
Problem: Respiratory - Adult  Goal: Achieves optimal ventilation and oxygenation  Outcome: Progressing  Flowsheets (Taken 3/3/2024 3834)  Achieves optimal ventilation and oxygenation:   Assess for changes in respiratory status   Respiratory therapy support as indicated   Position to facilitate oxygenation and minimize respiratory effort   Assess and instruct to report shortness of breath or any respiratory difficulty   Encourage broncho-pulmonary hygiene including cough, deep breathe, incentive spirometry   Assess for changes in mentation and behavior

## 2024-03-04 ENCOUNTER — ANESTHESIA EVENT (OUTPATIENT)
Dept: ENDOSCOPY | Age: 64
DRG: 189 | End: 2024-03-04
Payer: COMMERCIAL

## 2024-03-04 PROBLEM — Z91.148 NONCOMPLIANCE WITH MEDICATION REGIMEN: Status: ACTIVE | Noted: 2024-03-04

## 2024-03-04 LAB
ANION GAP BLD CALC-SCNC: ABNORMAL MMOL/L
ANION GAP SERPL CALC-SCNC: 2 MMOL/L (ref 2–11)
ARTERIAL PATENCY WRIST A: POSITIVE
BASE EXCESS BLD CALC-SCNC: 11 MMOL/L
BASOPHILS # BLD: 0 K/UL (ref 0–0.2)
BASOPHILS NFR BLD: 0 % (ref 0–2)
BDY SITE: ABNORMAL
BUN SERPL-MCNC: 80 MG/DL (ref 8–23)
CA-I BLD-MCNC: 1.18 MMOL/L (ref 1.12–1.32)
CALCIUM SERPL-MCNC: 8 MG/DL (ref 8.3–10.4)
CHLORIDE SERPL-SCNC: 103 MMOL/L (ref 103–113)
CO2 BLD-SCNC: 36 MMOL/L (ref 13–23)
CO2 SERPL-SCNC: 38 MMOL/L (ref 21–32)
CREAT SERPL-MCNC: 1.1 MG/DL (ref 0.8–1.5)
DIFFERENTIAL METHOD BLD: ABNORMAL
EOSINOPHIL # BLD: 0.1 K/UL (ref 0–0.8)
EOSINOPHIL NFR BLD: 0 % (ref 0.5–7.8)
ERYTHROCYTE [DISTWIDTH] IN BLOOD BY AUTOMATED COUNT: 17.4 % (ref 11.9–14.6)
FIO2 ON VENT: 50 %
GAS FLOW.O2 O2 DELIVERY SYS: ABNORMAL
GLUCOSE BLD STRIP.AUTO-MCNC: 114 MG/DL (ref 65–100)
GLUCOSE SERPL-MCNC: 130 MG/DL (ref 65–100)
HCO3 BLD-SCNC: 37.3 MMOL/L (ref 22–26)
HCT VFR BLD AUTO: 21.7 % (ref 41.1–50.3)
HCT VFR BLD AUTO: 27.6 % (ref 41.1–50.3)
HCT VFR BLD AUTO: 33.2 % (ref 41.1–50.3)
HCT VFR BLD AUTO: 34.9 % (ref 41.1–50.3)
HGB BLD-MCNC: 10 G/DL (ref 13.6–17.2)
HGB BLD-MCNC: 10.5 G/DL (ref 13.6–17.2)
HGB BLD-MCNC: 6.5 G/DL (ref 13.6–17.2)
HGB BLD-MCNC: 8.4 G/DL (ref 13.6–17.2)
HISTORY CHECK: NORMAL
IMM GRANULOCYTES # BLD AUTO: 0.1 K/UL (ref 0–0.5)
IMM GRANULOCYTES NFR BLD AUTO: 1 % (ref 0–5)
LYMPHOCYTES # BLD: 1.6 K/UL (ref 0.5–4.6)
LYMPHOCYTES NFR BLD: 7 % (ref 13–44)
MAGNESIUM SERPL-MCNC: 1.7 MG/DL (ref 1.8–2.4)
MCH RBC QN AUTO: 28.3 PG (ref 26.1–32.9)
MCHC RBC AUTO-ENTMCNC: 30.1 G/DL (ref 31.4–35)
MCV RBC AUTO: 94.1 FL (ref 82–102)
MONOCYTES # BLD: 2.7 K/UL (ref 0.1–1.3)
MONOCYTES NFR BLD: 11 % (ref 4–12)
NEUTS SEG # BLD: 19 K/UL (ref 1.7–8.2)
NEUTS SEG NFR BLD: 81 % (ref 43–78)
NRBC # BLD: 0.04 K/UL (ref 0–0.2)
NT PRO BNP: 733 PG/ML (ref 5–125)
PCO2 BLD: 57.2 MMHG (ref 35–45)
PH BLD: 7.42 (ref 7.35–7.45)
PLATELET # BLD AUTO: 368 K/UL (ref 150–450)
PMV BLD AUTO: 9.8 FL (ref 9.4–12.3)
PO2 BLD: 43 MMHG (ref 75–100)
POTASSIUM BLD-SCNC: 4.7 MMOL/L (ref 3.5–5.1)
POTASSIUM SERPL-SCNC: 4.9 MMOL/L (ref 3.5–5.1)
RBC # BLD AUTO: 3.53 M/UL (ref 4.23–5.6)
SAO2 % BLD: 77 %
SERVICE CMNT-IMP: ABNORMAL
SODIUM BLD-SCNC: 139 MMOL/L (ref 136–145)
SODIUM SERPL-SCNC: 143 MMOL/L (ref 136–146)
SPECIMEN SITE: ABNORMAL
TSH W FREE THYROID IF ABNORMAL: 1.03 UIU/ML (ref 0.36–3.74)
WBC # BLD AUTO: 23.4 K/UL (ref 4.3–11.1)

## 2024-03-04 PROCEDURE — 94640 AIRWAY INHALATION TREATMENT: CPT

## 2024-03-04 PROCEDURE — 6360000002 HC RX W HCPCS: Performed by: STUDENT IN AN ORGANIZED HEALTH CARE EDUCATION/TRAINING PROGRAM

## 2024-03-04 PROCEDURE — 82330 ASSAY OF CALCIUM: CPT

## 2024-03-04 PROCEDURE — 85014 HEMATOCRIT: CPT

## 2024-03-04 PROCEDURE — 84132 ASSAY OF SERUM POTASSIUM: CPT

## 2024-03-04 PROCEDURE — 84443 ASSAY THYROID STIM HORMONE: CPT

## 2024-03-04 PROCEDURE — 94660 CPAP INITIATION&MGMT: CPT

## 2024-03-04 PROCEDURE — 2700000000 HC OXYGEN THERAPY PER DAY

## 2024-03-04 PROCEDURE — 6370000000 HC RX 637 (ALT 250 FOR IP): Performed by: STUDENT IN AN ORGANIZED HEALTH CARE EDUCATION/TRAINING PROGRAM

## 2024-03-04 PROCEDURE — 94761 N-INVAS EAR/PLS OXIMETRY MLT: CPT

## 2024-03-04 PROCEDURE — 83880 ASSAY OF NATRIURETIC PEPTIDE: CPT

## 2024-03-04 PROCEDURE — 99232 SBSQ HOSP IP/OBS MODERATE 35: CPT | Performed by: STUDENT IN AN ORGANIZED HEALTH CARE EDUCATION/TRAINING PROGRAM

## 2024-03-04 PROCEDURE — 2580000003 HC RX 258: Performed by: STUDENT IN AN ORGANIZED HEALTH CARE EDUCATION/TRAINING PROGRAM

## 2024-03-04 PROCEDURE — 82947 ASSAY GLUCOSE BLOOD QUANT: CPT

## 2024-03-04 PROCEDURE — 99232 SBSQ HOSP IP/OBS MODERATE 35: CPT | Performed by: INTERNAL MEDICINE

## 2024-03-04 PROCEDURE — 85018 HEMOGLOBIN: CPT

## 2024-03-04 PROCEDURE — A4216 STERILE WATER/SALINE, 10 ML: HCPCS | Performed by: NURSE PRACTITIONER

## 2024-03-04 PROCEDURE — C9113 INJ PANTOPRAZOLE SODIUM, VIA: HCPCS | Performed by: NURSE PRACTITIONER

## 2024-03-04 PROCEDURE — 84295 ASSAY OF SERUM SODIUM: CPT

## 2024-03-04 PROCEDURE — 6370000000 HC RX 637 (ALT 250 FOR IP): Performed by: NURSE PRACTITIONER

## 2024-03-04 PROCEDURE — 82803 BLOOD GASES ANY COMBINATION: CPT

## 2024-03-04 PROCEDURE — 1100000003 HC PRIVATE W/ TELEMETRY

## 2024-03-04 PROCEDURE — 6370000000 HC RX 637 (ALT 250 FOR IP): Performed by: INTERNAL MEDICINE

## 2024-03-04 PROCEDURE — 85025 COMPLETE CBC W/AUTO DIFF WBC: CPT

## 2024-03-04 PROCEDURE — 36415 COLL VENOUS BLD VENIPUNCTURE: CPT

## 2024-03-04 PROCEDURE — 2580000003 HC RX 258: Performed by: NURSE PRACTITIONER

## 2024-03-04 PROCEDURE — 2580000003 HC RX 258: Performed by: FAMILY MEDICINE

## 2024-03-04 PROCEDURE — 83735 ASSAY OF MAGNESIUM: CPT

## 2024-03-04 PROCEDURE — 6370000000 HC RX 637 (ALT 250 FOR IP)

## 2024-03-04 PROCEDURE — 6360000002 HC RX W HCPCS: Performed by: NURSE PRACTITIONER

## 2024-03-04 PROCEDURE — 80048 BASIC METABOLIC PNL TOTAL CA: CPT

## 2024-03-04 PROCEDURE — 2580000003 HC RX 258: Performed by: HOSPITALIST

## 2024-03-04 RX ORDER — 0.9 % SODIUM CHLORIDE 0.9 %
250 INTRAVENOUS SOLUTION INTRAVENOUS ONCE
Status: COMPLETED | OUTPATIENT
Start: 2024-03-04 | End: 2024-03-04

## 2024-03-04 RX ORDER — IPRATROPIUM BROMIDE AND ALBUTEROL SULFATE 2.5; .5 MG/3ML; MG/3ML
1 SOLUTION RESPIRATORY (INHALATION)
Status: DISCONTINUED | OUTPATIENT
Start: 2024-03-04 | End: 2024-03-06

## 2024-03-04 RX ORDER — SODIUM CHLORIDE 9 MG/ML
INJECTION, SOLUTION INTRAVENOUS PRN
Status: DISCONTINUED | OUTPATIENT
Start: 2024-03-04 | End: 2024-03-05 | Stop reason: ALTCHOICE

## 2024-03-04 RX ORDER — MAGNESIUM SULFATE IN WATER 40 MG/ML
2000 INJECTION, SOLUTION INTRAVENOUS ONCE
Status: COMPLETED | OUTPATIENT
Start: 2024-03-04 | End: 2024-03-04

## 2024-03-04 RX ORDER — SODIUM CHLORIDE 9 MG/ML
INJECTION, SOLUTION INTRAVENOUS CONTINUOUS
Status: DISCONTINUED | OUTPATIENT
Start: 2024-03-04 | End: 2024-03-06

## 2024-03-04 RX ADMIN — SODIUM CHLORIDE, PRESERVATIVE FREE 10 ML: 5 INJECTION INTRAVENOUS at 21:42

## 2024-03-04 RX ADMIN — SODIUM CHLORIDE: 9 INJECTION, SOLUTION INTRAVENOUS at 08:54

## 2024-03-04 RX ADMIN — SODIUM CHLORIDE 250 ML: 9 INJECTION, SOLUTION INTRAVENOUS at 19:25

## 2024-03-04 RX ADMIN — BUDESONIDE 500 MCG: 0.5 INHALANT RESPIRATORY (INHALATION) at 19:28

## 2024-03-04 RX ADMIN — FLUTICASONE PROPIONATE 2 SPRAY: 50 SPRAY, METERED NASAL at 10:26

## 2024-03-04 RX ADMIN — IPRATROPIUM BROMIDE AND ALBUTEROL SULFATE 1 DOSE: 2.5; .5 SOLUTION RESPIRATORY (INHALATION) at 11:38

## 2024-03-04 RX ADMIN — POLYETHYLENE GLYCOL-3350 AND ELECTROLYTES 4000 ML: 236; 6.74; 5.86; 2.97; 22.74 POWDER, FOR SOLUTION ORAL at 17:16

## 2024-03-04 RX ADMIN — PANTOPRAZOLE SODIUM 40 MG: 40 INJECTION, POWDER, FOR SOLUTION INTRAVENOUS at 08:41

## 2024-03-04 RX ADMIN — SODIUM CHLORIDE, PRESERVATIVE FREE 10 ML: 5 INJECTION INTRAVENOUS at 08:43

## 2024-03-04 RX ADMIN — MAGNESIUM SULFATE HEPTAHYDRATE 2000 MG: 40 INJECTION, SOLUTION INTRAVENOUS at 10:25

## 2024-03-04 RX ADMIN — PANTOPRAZOLE SODIUM 40 MG: 40 INJECTION, POWDER, FOR SOLUTION INTRAVENOUS at 21:42

## 2024-03-04 RX ADMIN — IPRATROPIUM BROMIDE AND ALBUTEROL SULFATE 1 DOSE: 2.5; .5 SOLUTION RESPIRATORY (INHALATION) at 00:37

## 2024-03-04 RX ADMIN — BUDESONIDE 500 MCG: 0.5 INHALANT RESPIRATORY (INHALATION) at 11:37

## 2024-03-04 RX ADMIN — WATER 40 MG: 1 INJECTION INTRAMUSCULAR; INTRAVENOUS; SUBCUTANEOUS at 05:01

## 2024-03-04 RX ADMIN — IPRATROPIUM BROMIDE AND ALBUTEROL SULFATE 1 DOSE: 2.5; .5 SOLUTION RESPIRATORY (INHALATION) at 19:28

## 2024-03-04 RX ADMIN — SODIUM CHLORIDE: 9 INJECTION, SOLUTION INTRAVENOUS at 21:45

## 2024-03-04 RX ADMIN — IPRATROPIUM BROMIDE AND ALBUTEROL SULFATE 1 DOSE: 2.5; .5 SOLUTION RESPIRATORY (INHALATION) at 04:36

## 2024-03-04 ASSESSMENT — ENCOUNTER SYMPTOMS
VOMITING: 0
DIARRHEA: 1
BLOOD IN STOOL: 1
CONSTIPATION: 0
NAUSEA: 0
ABDOMINAL PAIN: 0

## 2024-03-04 NOTE — CARE COORDINATION
MSN, CM:  spoke with patient this AM about discharge planning.  Patient lives alone in own home with no steps for entrance.  Patient is independent with all ADL's and requires no equipment for ambulation.  Patient does require Bipap at HS.  Patient denies any HH, rehab or home oxygen currently.  Patient works full time and is able to drive himself to all appointments.  Patient is currently on 50/50 Airvo with room air at baseline.  Patient has no discharge needs at this time.  Case Management will continue to follow for any discharge needs that may arise.         03/04/24 0924   Service Assessment   Patient Orientation Alert and Oriented   Cognition Alert   History Provided By Patient   Primary Caregiver Self   Support Systems Children;Family Members   Patient's Healthcare Decision Maker is: Legal Next of Kin   PCP Verified by CM Yes   Last Visit to PCP Within last 3 months   Prior Functional Level Independent in ADLs/IADLs   Current Functional Level Independent in ADLs/IADLs   Can patient return to prior living arrangement Yes   Ability to make needs known: Good   Family able to assist with home care needs: Yes   Would you like for me to discuss the discharge plan with any other family members/significant others, and if so, who? No   Financial Resources Other (Comment)  ([facility])   Community Resources None   Social/Functional History   Lives With Alone   Type of Home House   Home Layout One level   Home Access Level entry   Bathroom Shower/Tub Walk-in shower   Bathroom Toilet Standard   Bathroom Equipment None   Bathroom Accessibility Accessible   Home Equipment None   Receives Help From Family   ADL Assistance Independent   Homemaking Assistance Independent   Homemaking Responsibilities Yes   Ambulation Assistance Independent   Transfer Assistance Independent   Active  Yes   Mode of Transportation Car;SUV;Truck   Occupation Full time employment   Type of Occupation Construction - Super[employer]   Discharge Planning

## 2024-03-04 NOTE — PERIOP NOTE
Tc to Corrie, unit sec/she states rn 'tied up in report,' but agrees to take info -advised her pt to be npo after midnight, working iv and dressed in pt gown and we would have pt transported to preop area around 1000-she verb understanding and states she will pass info. On to oncoming shift/thogan,rn

## 2024-03-04 NOTE — CONSULTS
Consult Note            Date:3/4/2024        Patient Name:Tay Conklin     YOB: 1960     Age:63 y.o.    Inpatient consult to GI  Consult performed by: Michael Roman PA  Consult ordered by: Rocio Bates DO          Chief Complaint     Chief Complaint   Patient presents with    Shortness of Breath    Leg Swelling    ascites          History Obtained From   patient    History of Present Illness   Patient is a 63 year old male, with a hx of COPD, CHF, polycythemia, who was initially admitted for hypercapnic respiratory failure due to COPD and LILA. GI consulted as patient passed 4x episodes dark red hematochezia last night with Hgb drop from 12 to 10 overnight. Patient also with a hx of polycythemia due to chronic hypoxia in which his Hgb was >18 on admission and has been getting phlebotomy; states that he had 250 mL of blood removed last night (I was only able to see phlebotomy on Saturday based on I/O). Patient also on lovenox for DVT prophylaxis, which has since been held. Patient reports this is the first BM he has passed since admission. States that it was a large amount of bloody diarrhea; small amount of dark red blood is stained on bedsheet States that he had regular BMs at home. No prior hx of GI bleeding. No associated nausea, vomiting, abdominal pain, weight loss, loss of appetite. He is not on blood thinners at home and no chronic NSAID use other than daily 81 mg ASA. No known hx of hemorrhoids/diverticulosis; states that his last CSP was last year and was normal (cannot find in system) and no prior EGD.     Lovenox has been held this AM. Patient is NPO.    Labs: Hgb 10 (12), BUN/Cr 72.    Medications: PPI IV BID    Past Medical History   COPD, CHF, polycythemia     Past Surgical History   No past surgical history on file.     Medications     Prior to Admission medications    Medication Sig Start Date End Date Taking? Authorizing Provider   amLODIPine (NORVASC) 5 MG tablet Take 1

## 2024-03-04 NOTE — ACP (ADVANCE CARE PLANNING)
Advance Care Planning     General Advance Care Planning (ACP) Conversation    Date of Conversation: 3/1/2024  Conducted with: Patient with Decision Making Capacity    Healthcare Decision Maker:    Primary Decision Maker: Mckenna Fried  [phone]  Click here to complete Healthcare Decision Makers including selection of the Healthcare Decision Maker Relationship (ie \"Primary\").  Today we documented Decision Maker(s) consistent with Legal Next of Kin hierarchy.    Content/Action Overview:  Patient is ready to consider care preferences-refer to ACP Clinical Specialist  Reviewed DNR/DNI and patient elects Full Code (Attempt Resuscitation)        Length of Voluntary ACP Conversation in minutes:  <16 minutes (Non-Billable)    Lise Mariee RN

## 2024-03-04 NOTE — PLAN OF CARE
Problem: Respiratory - Adult  Goal: Achieves optimal ventilation and oxygenation  Outcome: Progressing  Flowsheets  Taken 3/4/2024 1141 by Dayanara Yao RCP  Achieves optimal ventilation and oxygenation:   Assess for changes in respiratory status   Respiratory therapy support as indicated   Position to facilitate oxygenation and minimize respiratory effort   Assess and instruct to report shortness of breath or any respiratory difficulty   Encourage broncho-pulmonary hygiene including cough, deep breathe, incentive spirometry   Assess for changes in mentation and behavior  Taken 3/4/2024 0759 by Amanda Collins RN  Achieves optimal ventilation and oxygenation:   Assess and instruct to report shortness of breath or any respiratory difficulty   Assess for changes in respiratory status

## 2024-03-04 NOTE — ICUWATCH
RRT Clinical Rounding Nurse Progress Report      SUBJECTIVE: Patient assessed secondary to transfer from critical care.      Vitals:    03/04/24 0755 03/04/24 1140 03/04/24 1201 03/04/24 1539   BP: (!) 116/96  93/67    Pulse: (!) 109 100 97 98   Resp: 15 16 15 16   Temp: 97.9 °F (36.6 °C)  98.4 °F (36.9 °C)    TempSrc: Oral  Axillary    SpO2: 95% 98% 95% 94%   Weight:       Height:            DETERIORATION INDEX SCORE: 41    ASSESSMENT:  Pt awake in chair, oriented x3. On Airvo 50L 45%, lung fields coarse bilaterally.  Pt reports only one bloody BM today.  Hgb stable last two checks. Plans for EGD and Colonoscopy in am. Pertinent labs, images, and notes reviewed. Case discussed with primary RN.    PLAN:  Will discharge from RRT Clinical Rounding Program per protocol. Please call if needed.    Marquis Henriquez RN  [facility]: [phone]  [facility]: [phone]

## 2024-03-05 ENCOUNTER — ANESTHESIA (OUTPATIENT)
Dept: ENDOSCOPY | Age: 64
DRG: 189 | End: 2024-03-05
Payer: COMMERCIAL

## 2024-03-05 PROBLEM — Z87.891 HISTORY OF TOBACCO ABUSE: Chronic | Status: ACTIVE | Noted: 2024-03-01

## 2024-03-05 LAB
ALBUMIN SERPL-MCNC: 2.1 G/DL (ref 3.2–4.6)
ALBUMIN/GLOB SERPL: 1.2 (ref 0.4–1.6)
ALP SERPL-CCNC: 23 U/L (ref 50–136)
ALT SERPL-CCNC: 27 U/L (ref 12–65)
ANION GAP SERPL CALC-SCNC: 0 MMOL/L (ref 2–11)
AST SERPL-CCNC: 24 U/L (ref 15–37)
BASOPHILS # BLD: 0 K/UL (ref 0–0.2)
BASOPHILS NFR BLD: 0 % (ref 0–2)
BILIRUB SERPL-MCNC: 1.4 MG/DL (ref 0.2–1.1)
BUN SERPL-MCNC: 59 MG/DL (ref 8–23)
CALCIUM SERPL-MCNC: 8.2 MG/DL (ref 8.3–10.4)
CHLORIDE SERPL-SCNC: 102 MMOL/L (ref 103–113)
CO2 SERPL-SCNC: 39 MMOL/L (ref 21–32)
CREAT SERPL-MCNC: 1.1 MG/DL (ref 0.8–1.5)
DIFFERENTIAL METHOD BLD: ABNORMAL
EOSINOPHIL # BLD: 0.2 K/UL (ref 0–0.8)
EOSINOPHIL NFR BLD: 1 % (ref 0.5–7.8)
ERYTHROCYTE [DISTWIDTH] IN BLOOD BY AUTOMATED COUNT: 17.6 % (ref 11.9–14.6)
GLOBULIN SER CALC-MCNC: 1.8 G/DL (ref 2.8–4.5)
GLUCOSE SERPL-MCNC: 98 MG/DL (ref 65–100)
HCT VFR BLD AUTO: 21.6 % (ref 41.1–50.3)
HCT VFR BLD AUTO: 24 % (ref 41.1–50.3)
HCT VFR BLD AUTO: 25.3 % (ref 41.1–50.3)
HGB BLD-MCNC: 6.7 G/DL (ref 13.6–17.2)
HGB BLD-MCNC: 7.6 G/DL (ref 13.6–17.2)
HGB BLD-MCNC: 7.9 G/DL (ref 13.6–17.2)
IMM GRANULOCYTES # BLD AUTO: 0.1 K/UL (ref 0–0.5)
IMM GRANULOCYTES NFR BLD AUTO: 1 % (ref 0–5)
LYMPHOCYTES # BLD: 2.5 K/UL (ref 0.5–4.6)
LYMPHOCYTES NFR BLD: 14 % (ref 13–44)
MAGNESIUM SERPL-MCNC: 1.9 MG/DL (ref 1.8–2.4)
MCH RBC QN AUTO: 28.6 PG (ref 26.1–32.9)
MCHC RBC AUTO-ENTMCNC: 31.7 G/DL (ref 31.4–35)
MCV RBC AUTO: 90.2 FL (ref 82–102)
MONOCYTES # BLD: 2.1 K/UL (ref 0.1–1.3)
MONOCYTES NFR BLD: 12 % (ref 4–12)
NEUTS SEG # BLD: 13.4 K/UL (ref 1.7–8.2)
NEUTS SEG NFR BLD: 73 % (ref 43–78)
NRBC # BLD: 0 K/UL (ref 0–0.2)
PLATELET # BLD AUTO: 284 K/UL (ref 150–450)
PMV BLD AUTO: 10 FL (ref 9.4–12.3)
POTASSIUM SERPL-SCNC: 4.6 MMOL/L (ref 3.5–5.1)
PROT SERPL-MCNC: 3.9 G/DL (ref 6.3–8.2)
RBC # BLD AUTO: 2.66 M/UL (ref 4.23–5.6)
SODIUM SERPL-SCNC: 141 MMOL/L (ref 136–146)
WBC # BLD AUTO: 18.3 K/UL (ref 4.3–11.1)

## 2024-03-05 PROCEDURE — 36415 COLL VENOUS BLD VENIPUNCTURE: CPT

## 2024-03-05 PROCEDURE — 36430 TRANSFUSION BLD/BLD COMPNT: CPT

## 2024-03-05 PROCEDURE — 94761 N-INVAS EAR/PLS OXIMETRY MLT: CPT

## 2024-03-05 PROCEDURE — 6360000002 HC RX W HCPCS: Performed by: STUDENT IN AN ORGANIZED HEALTH CARE EDUCATION/TRAINING PROGRAM

## 2024-03-05 PROCEDURE — 80053 COMPREHEN METABOLIC PANEL: CPT

## 2024-03-05 PROCEDURE — 2580000003 HC RX 258: Performed by: NURSE PRACTITIONER

## 2024-03-05 PROCEDURE — 6360000002 HC RX W HCPCS: Performed by: NURSE PRACTITIONER

## 2024-03-05 PROCEDURE — 76937 US GUIDE VASCULAR ACCESS: CPT

## 2024-03-05 PROCEDURE — 85025 COMPLETE CBC W/AUTO DIFF WBC: CPT

## 2024-03-05 PROCEDURE — 3609027000 HC COLONOSCOPY: Performed by: STUDENT IN AN ORGANIZED HEALTH CARE EDUCATION/TRAINING PROGRAM

## 2024-03-05 PROCEDURE — 30233N1 TRANSFUSION OF NONAUTOLOGOUS RED BLOOD CELLS INTO PERIPHERAL VEIN, PERCUTANEOUS APPROACH: ICD-10-PCS | Performed by: STUDENT IN AN ORGANIZED HEALTH CARE EDUCATION/TRAINING PROGRAM

## 2024-03-05 PROCEDURE — 83735 ASSAY OF MAGNESIUM: CPT

## 2024-03-05 PROCEDURE — 2709999900 HC NON-CHARGEABLE SUPPLY: Performed by: STUDENT IN AN ORGANIZED HEALTH CARE EDUCATION/TRAINING PROGRAM

## 2024-03-05 PROCEDURE — 7100000001 HC PACU RECOVERY - ADDTL 15 MIN: Performed by: STUDENT IN AN ORGANIZED HEALTH CARE EDUCATION/TRAINING PROGRAM

## 2024-03-05 PROCEDURE — 3609017100 HC EGD: Performed by: STUDENT IN AN ORGANIZED HEALTH CARE EDUCATION/TRAINING PROGRAM

## 2024-03-05 PROCEDURE — 2500000003 HC RX 250 WO HCPCS

## 2024-03-05 PROCEDURE — 3700000001 HC ADD 15 MINUTES (ANESTHESIA): Performed by: STUDENT IN AN ORGANIZED HEALTH CARE EDUCATION/TRAINING PROGRAM

## 2024-03-05 PROCEDURE — 0DJD8ZZ INSPECTION OF LOWER INTESTINAL TRACT, VIA NATURAL OR ARTIFICIAL OPENING ENDOSCOPIC: ICD-10-PCS | Performed by: STUDENT IN AN ORGANIZED HEALTH CARE EDUCATION/TRAINING PROGRAM

## 2024-03-05 PROCEDURE — 2580000003 HC RX 258: Performed by: ANESTHESIOLOGY

## 2024-03-05 PROCEDURE — A4216 STERILE WATER/SALINE, 10 ML: HCPCS | Performed by: NURSE PRACTITIONER

## 2024-03-05 PROCEDURE — 6360000002 HC RX W HCPCS

## 2024-03-05 PROCEDURE — P9016 RBC LEUKOCYTES REDUCED: HCPCS

## 2024-03-05 PROCEDURE — 94660 CPAP INITIATION&MGMT: CPT

## 2024-03-05 PROCEDURE — 7100000000 HC PACU RECOVERY - FIRST 15 MIN: Performed by: STUDENT IN AN ORGANIZED HEALTH CARE EDUCATION/TRAINING PROGRAM

## 2024-03-05 PROCEDURE — 6370000000 HC RX 637 (ALT 250 FOR IP): Performed by: NURSE PRACTITIONER

## 2024-03-05 PROCEDURE — 3700000000 HC ANESTHESIA ATTENDED CARE: Performed by: STUDENT IN AN ORGANIZED HEALTH CARE EDUCATION/TRAINING PROGRAM

## 2024-03-05 PROCEDURE — C9113 INJ PANTOPRAZOLE SODIUM, VIA: HCPCS | Performed by: NURSE PRACTITIONER

## 2024-03-05 PROCEDURE — 2580000003 HC RX 258: Performed by: STUDENT IN AN ORGANIZED HEALTH CARE EDUCATION/TRAINING PROGRAM

## 2024-03-05 PROCEDURE — 2700000000 HC OXYGEN THERAPY PER DAY

## 2024-03-05 PROCEDURE — 94640 AIRWAY INHALATION TREATMENT: CPT

## 2024-03-05 PROCEDURE — 1100000003 HC PRIVATE W/ TELEMETRY

## 2024-03-05 PROCEDURE — 85014 HEMATOCRIT: CPT

## 2024-03-05 PROCEDURE — 85018 HEMOGLOBIN: CPT

## 2024-03-05 RX ORDER — SODIUM CHLORIDE 9 MG/ML
INJECTION, SOLUTION INTRAVENOUS PRN
Status: DISCONTINUED | OUTPATIENT
Start: 2024-03-05 | End: 2024-03-05 | Stop reason: HOSPADM

## 2024-03-05 RX ORDER — PROCHLORPERAZINE EDISYLATE 5 MG/ML
5 INJECTION INTRAMUSCULAR; INTRAVENOUS
Status: DISCONTINUED | OUTPATIENT
Start: 2024-03-05 | End: 2024-03-05 | Stop reason: HOSPADM

## 2024-03-05 RX ORDER — SODIUM CHLORIDE, SODIUM LACTATE, POTASSIUM CHLORIDE, CALCIUM CHLORIDE 600; 310; 30; 20 MG/100ML; MG/100ML; MG/100ML; MG/100ML
INJECTION, SOLUTION INTRAVENOUS CONTINUOUS
Status: DISCONTINUED | OUTPATIENT
Start: 2024-03-05 | End: 2024-03-05 | Stop reason: HOSPADM

## 2024-03-05 RX ORDER — LIDOCAINE HYDROCHLORIDE 20 MG/ML
INJECTION, SOLUTION EPIDURAL; INFILTRATION; INTRACAUDAL; PERINEURAL PRN
Status: DISCONTINUED | OUTPATIENT
Start: 2024-03-05 | End: 2024-03-05 | Stop reason: SDUPTHER

## 2024-03-05 RX ORDER — ONDANSETRON 2 MG/ML
4 INJECTION INTRAMUSCULAR; INTRAVENOUS
Status: DISCONTINUED | OUTPATIENT
Start: 2024-03-05 | End: 2024-03-05 | Stop reason: HOSPADM

## 2024-03-05 RX ORDER — DEXTROSE MONOHYDRATE 100 MG/ML
INJECTION, SOLUTION INTRAVENOUS CONTINUOUS PRN
Status: DISCONTINUED | OUTPATIENT
Start: 2024-03-05 | End: 2024-03-05 | Stop reason: HOSPADM

## 2024-03-05 RX ORDER — SODIUM CHLORIDE 0.9 % (FLUSH) 0.9 %
5-40 SYRINGE (ML) INJECTION PRN
Status: DISCONTINUED | OUTPATIENT
Start: 2024-03-05 | End: 2024-03-05 | Stop reason: HOSPADM

## 2024-03-05 RX ORDER — KETAMINE HYDROCHLORIDE 50 MG/ML
INJECTION, SOLUTION INTRAMUSCULAR; INTRAVENOUS PRN
Status: DISCONTINUED | OUTPATIENT
Start: 2024-03-05 | End: 2024-03-05 | Stop reason: SDUPTHER

## 2024-03-05 RX ORDER — LIDOCAINE HYDROCHLORIDE 10 MG/ML
1 INJECTION, SOLUTION INFILTRATION; PERINEURAL
Status: DISCONTINUED | OUTPATIENT
Start: 2024-03-05 | End: 2024-03-05 | Stop reason: HOSPADM

## 2024-03-05 RX ORDER — SODIUM CHLORIDE 0.9 % (FLUSH) 0.9 %
5-40 SYRINGE (ML) INJECTION EVERY 12 HOURS SCHEDULED
Status: DISCONTINUED | OUTPATIENT
Start: 2024-03-05 | End: 2024-03-05 | Stop reason: HOSPADM

## 2024-03-05 RX ORDER — PANTOPRAZOLE SODIUM 40 MG/1
40 TABLET, DELAYED RELEASE ORAL
Status: DISCONTINUED | OUTPATIENT
Start: 2024-03-05 | End: 2024-03-07 | Stop reason: HOSPADM

## 2024-03-05 RX ORDER — MIDAZOLAM HYDROCHLORIDE 1 MG/ML
INJECTION INTRAMUSCULAR; INTRAVENOUS PRN
Status: DISCONTINUED | OUTPATIENT
Start: 2024-03-05 | End: 2024-03-05 | Stop reason: SDUPTHER

## 2024-03-05 RX ORDER — IBUPROFEN 600 MG/1
1 TABLET ORAL PRN
Status: DISCONTINUED | OUTPATIENT
Start: 2024-03-05 | End: 2024-03-05 | Stop reason: HOSPADM

## 2024-03-05 RX ORDER — SODIUM CHLORIDE, SODIUM LACTATE, POTASSIUM CHLORIDE, CALCIUM CHLORIDE 600; 310; 30; 20 MG/100ML; MG/100ML; MG/100ML; MG/100ML
INJECTION, SOLUTION INTRAVENOUS CONTINUOUS
Status: DISCONTINUED | OUTPATIENT
Start: 2024-03-05 | End: 2024-03-05

## 2024-03-05 RX ORDER — PROPOFOL 10 MG/ML
INJECTION, EMULSION INTRAVENOUS CONTINUOUS PRN
Status: DISCONTINUED | OUTPATIENT
Start: 2024-03-05 | End: 2024-03-05 | Stop reason: SDUPTHER

## 2024-03-05 RX ADMIN — IPRATROPIUM BROMIDE AND ALBUTEROL SULFATE 1 DOSE: 2.5; .5 SOLUTION RESPIRATORY (INHALATION) at 08:26

## 2024-03-05 RX ADMIN — PROPOFOL 100 MCG/KG/MIN: 10 INJECTION, EMULSION INTRAVENOUS at 14:00

## 2024-03-05 RX ADMIN — MIDAZOLAM 1 MG: 1 INJECTION INTRAMUSCULAR; INTRAVENOUS at 14:01

## 2024-03-05 RX ADMIN — WATER 40 MG: 1 INJECTION INTRAMUSCULAR; INTRAVENOUS; SUBCUTANEOUS at 10:10

## 2024-03-05 RX ADMIN — SODIUM CHLORIDE, PRESERVATIVE FREE 10 ML: 5 INJECTION INTRAVENOUS at 10:24

## 2024-03-05 RX ADMIN — FLUTICASONE PROPIONATE 2 SPRAY: 50 SPRAY, METERED NASAL at 10:11

## 2024-03-05 RX ADMIN — KETAMINE HYDROCHLORIDE 10 MG: 50 INJECTION, SOLUTION INTRAMUSCULAR; INTRAVENOUS at 14:00

## 2024-03-05 RX ADMIN — MIDAZOLAM 1 MG: 1 INJECTION INTRAMUSCULAR; INTRAVENOUS at 13:58

## 2024-03-05 RX ADMIN — IPRATROPIUM BROMIDE AND ALBUTEROL SULFATE 1 DOSE: 2.5; .5 SOLUTION RESPIRATORY (INHALATION) at 19:49

## 2024-03-05 RX ADMIN — KETAMINE HYDROCHLORIDE 10 MG: 50 INJECTION, SOLUTION INTRAMUSCULAR; INTRAVENOUS at 13:58

## 2024-03-05 RX ADMIN — PANTOPRAZOLE SODIUM 40 MG: 40 TABLET, DELAYED RELEASE ORAL at 17:10

## 2024-03-05 RX ADMIN — BUDESONIDE 500 MCG: 0.5 INHALANT RESPIRATORY (INHALATION) at 19:49

## 2024-03-05 RX ADMIN — PANTOPRAZOLE SODIUM 40 MG: 40 INJECTION, POWDER, FOR SOLUTION INTRAVENOUS at 10:10

## 2024-03-05 RX ADMIN — BUDESONIDE 500 MCG: 0.5 INHALANT RESPIRATORY (INHALATION) at 08:27

## 2024-03-05 RX ADMIN — SODIUM CHLORIDE, POTASSIUM CHLORIDE, SODIUM LACTATE AND CALCIUM CHLORIDE: 600; 310; 30; 20 INJECTION, SOLUTION INTRAVENOUS at 11:25

## 2024-03-05 RX ADMIN — LIDOCAINE HYDROCHLORIDE 40 MG: 20 INJECTION, SOLUTION EPIDURAL; INFILTRATION; INTRACAUDAL; PERINEURAL at 13:58

## 2024-03-05 RX ADMIN — SODIUM CHLORIDE, PRESERVATIVE FREE 10 ML: 5 INJECTION INTRAVENOUS at 20:45

## 2024-03-05 ASSESSMENT — COPD QUESTIONNAIRES: CAT_SEVERITY: MILD

## 2024-03-05 ASSESSMENT — PAIN - FUNCTIONAL ASSESSMENT
PAIN_FUNCTIONAL_ASSESSMENT: 0-10
PAIN_FUNCTIONAL_ASSESSMENT: NONE - DENIES PAIN

## 2024-03-05 ASSESSMENT — LIFESTYLE VARIABLES: SMOKING_STATUS: 1

## 2024-03-05 ASSESSMENT — ENCOUNTER SYMPTOMS: SHORTNESS OF BREATH: 1

## 2024-03-05 NOTE — PLAN OF CARE
Problem: Respiratory - Adult  Goal: Achieves optimal ventilation and oxygenation  Outcome: Progressing  Flowsheets (Taken 3/5/2024 6968)  Achieves optimal ventilation and oxygenation:   Assess for changes in respiratory status   Respiratory therapy support as indicated   Position to facilitate oxygenation and minimize respiratory effort   Assess and instruct to report shortness of breath or any respiratory difficulty   Encourage broncho-pulmonary hygiene including cough, deep breathe, incentive spirometry   Assess for changes in mentation and behavior

## 2024-03-05 NOTE — PERIOP NOTE
TRANSFER - OUT REPORT:    Verbal report given to SHAWN Weber on Tay Conklin  being transferred to [facility] for routine post-op       Report consisted of patient’s Situation, Background, Assessment and   Recommendations(SBAR).     Information from the following report(s) Nurse Handoff Report, Surgery Report, MAR, Cardiac Rhythm NSR, and Quality Measures was reviewed with the receiving nurse.    Lines:   Peripheral IV 03/05/24 Right;Anterior Forearm (Active)   Site Assessment Clean, dry & intact 03/05/24 1432   Line Status Infusing 03/05/24 1432   Line Care Connections checked and tightened 03/05/24 1432   Phlebitis Assessment No symptoms 03/05/24 1432   Infiltration Assessment 0 03/05/24 1432   Alcohol Cap Used Yes 03/05/24 0402   Dressing Status Clean, dry & intact 03/05/24 1432   Dressing Type Transparent 03/05/24 1432   Dressing Intervention New 03/05/24 0015        Opportunity for questions and clarification was provided.      Patient transported with:   Tech    VTE prophylaxis orders have been written for Tay Conklin.    Patient and family given floor number and nurses name.  Family updated re: pt status after security code verified.

## 2024-03-05 NOTE — ANESTHESIA PRE PROCEDURE
• polyethylene glycol (GLYCOLAX) packet 17 g  17 g Oral Daily PRN Eileen Man APRN - NP       • acetaminophen (TYLENOL) tablet 650 mg  650 mg Oral Q6H PRN Eileen Man APRN - NP        Or   • acetaminophen (TYLENOL) suppository 650 mg  650 mg Rectal Q6H PRN iEleen Man APRN - NP       • budesonide (PULMICORT) nebulizer suspension 500 mcg  0.5 mg Nebulization BID RT Eileen Man APRN - NP   500 mcg at 03/05/24 0827   • [Held by provider] furosemide (LASIX) injection 40 mg  40 mg IntraVENous BID Eileen Man APRN - NP   40 mg at 03/03/24 1022       Allergies:  No Known Allergies    Problem List:    Patient Active Problem List   Diagnosis Code   • Acute respiratory failure with hypoxia and hypercapnia ([facility]) J96.01, J96.02   • Acute on chronic heart failure ([facility]) I50.9   • LILA (obstructive sleep apnea) G47.33   • Polycythemia D75.1   • COPD (chronic obstructive pulmonary disease) ([facility]) J44.9   • History of tobacco abuse Z87.891   • Obesity E66.9   • Elevated troponin level not due myocardial infarction R79.89   • Acute kidney injury ([facility]) N17.9   • Melena K92.1   • Noncompliance with medication regimen Z91.148       Past Medical History:  No past medical history on file.    Past Surgical History:  No past surgical history on file.    Social History:    Social History     Tobacco Use   • Smoking status: Former     Types: Cigarettes   • Smokeless tobacco: Never   Substance Use Topics   • Alcohol use: Not on file                                Counseling given: Not Answered      Vital Signs (Current):   Vitals:    03/05/24 0729 03/05/24 0833 03/05/24 0840 03/05/24 1117   BP: 101/62   (!) 93/51   Pulse: 88 83  86   Resp: 18 18  16   Temp: 97.5 °F (36.4 °C)   97.4 °F (36.3 °C)   TempSrc: Oral   Oral   SpO2: 99% 96% 95% 97%   Weight:       Height:                                                  BP Readings from Last 3 Encounters:   03/05/24 (!) 93/51       NPO Status: Time of last liquid consumption: 2200

## 2024-03-05 NOTE — OP NOTE
Operative Report    Patient: Tay Conklin MRN: [id number]      YOB: 1960  Age: 63 y.o.  Sex: male            Indications:  Bright red blood per rectum.    Preoperative Evaluation: The patient was evaluated prior to the procedure in the GI lab admission area, the patient ASA was recorded .  Consent was obtained from the patient with the risk of perforation bleeding and aspiration.    Anesthesia: BIANCA-per anesthesia    Complications: None; patient tolerated the procedure well.    EBL -insignificant      Procedure: The patient was sedated in the left lateral decubitus position.  Scope was advanced from the rectum to the ileum.  Per gastroenterology society guideline recommendations, right side of the colon was examined twice. Evaluation was performed to the cecum twice.  The scope was withdrawn to the rectum, retroflexed view was performed.  The rectal exam was normal.  Preparation was inadequate. Shaniko score of 3 .    Findings:    Normal terminal ileum, no signs of blood in the terminal ileum.  Old blood throughout the colon with clots, noted diverticulosis especially in the sigmoid colon.  This examination cannot be used for screening purposes.    Postoperative Diagnosis:   Diverticulosis related bleeding.    Recommendations:   Resume regular diet.  He will pass some old blood that is expected, bleeding seems to be stopped at this time.  GI will sign off at this time.    Signed By:  Tono Napoles MD     March 5, 2024

## 2024-03-05 NOTE — OP NOTE
Endoscopy Note          Operative Report    Patient: Tay Conklin MRN: [id number]      YOB: 1960  Age: 63 y.o.  Sex: male            Indications:  GI bleeding.    Preoperative Evaluation: The patient was evaluated prior to the procedure in the GI lab admission area, the patient ASA was recorded .  Consent was obtained from the patient with the risk of perforation bleeding and aspiration.    Anesthesia: BIANCA-per anesthesia  Complications: None  EBL: Unremarkable  Procedure: The patient was sedated in the left lateral decubitus position. Scope was advance from the mouth to the third portion of the duodenum. The scope was withdrawn to the stomach and a refroflexed view was performed.     Findings:  Normal esophagus.  Mild gastritis without any bleeding.  Multiple clean-based small bowel ulcers in the first and second portion of the duodenum.  No biopsies were obtained from stomach given this was done for bleeding purposes.    Postoperative Diagnosis:  Gastritis and small bowel ulcers.    Recommendations:   Obtain H. pylori stool antigen.  Recommend PPI p.o. twice daily AC for 8 weeks followed by PPI daily indefinitely.  Proceed to colonoscopy.      Signed By:  Tono Napoles MD     March 5, 2024

## 2024-03-06 PROBLEM — K57.90 DIVERTICULOSIS: Status: ACTIVE | Noted: 2024-03-06

## 2024-03-06 PROBLEM — K29.70 GASTRITIS: Status: ACTIVE | Noted: 2024-03-06

## 2024-03-06 PROBLEM — J96.01 ACUTE RESPIRATORY FAILURE WITH HYPOXIA AND HYPERCAPNIA (HCC): Status: RESOLVED | Noted: 2024-03-01 | Resolved: 2024-03-06

## 2024-03-06 PROBLEM — K92.1 MELENA: Status: RESOLVED | Noted: 2024-03-03 | Resolved: 2024-03-06

## 2024-03-06 PROBLEM — N17.9 ACUTE KIDNEY INJURY (HCC): Status: RESOLVED | Noted: 2024-03-01 | Resolved: 2024-03-06

## 2024-03-06 PROBLEM — J96.12 CHRONIC RESPIRATORY FAILURE WITH HYPERCAPNIA (HCC): Status: ACTIVE | Noted: 2024-03-06

## 2024-03-06 PROBLEM — J96.02 ACUTE RESPIRATORY FAILURE WITH HYPOXIA AND HYPERCAPNIA (HCC): Status: RESOLVED | Noted: 2024-03-01 | Resolved: 2024-03-06

## 2024-03-06 LAB
ALBUMIN SERPL-MCNC: 2 G/DL (ref 3.2–4.6)
ALBUMIN/GLOB SERPL: 1.1 (ref 0.4–1.6)
ALP SERPL-CCNC: 25 U/L (ref 50–136)
ALT SERPL-CCNC: 31 U/L (ref 12–65)
ANION GAP SERPL CALC-SCNC: ABNORMAL MMOL/L (ref 2–11)
AST SERPL-CCNC: 28 U/L (ref 15–37)
BACTERIA SPEC CULT: NORMAL
BACTERIA SPEC CULT: NORMAL
BASOPHILS # BLD: 0 K/UL (ref 0–0.2)
BASOPHILS NFR BLD: 0 % (ref 0–2)
BILIRUB SERPL-MCNC: 0.9 MG/DL (ref 0.2–1.1)
BUN SERPL-MCNC: 37 MG/DL (ref 8–23)
CALCIUM SERPL-MCNC: 8.1 MG/DL (ref 8.3–10.4)
CHLORIDE SERPL-SCNC: 103 MMOL/L (ref 103–113)
CO2 SERPL-SCNC: 39 MMOL/L (ref 21–32)
CREAT SERPL-MCNC: 0.9 MG/DL (ref 0.8–1.5)
DIFFERENTIAL METHOD BLD: ABNORMAL
EOSINOPHIL # BLD: 0.3 K/UL (ref 0–0.8)
EOSINOPHIL NFR BLD: 1 % (ref 0.5–7.8)
ERYTHROCYTE [DISTWIDTH] IN BLOOD BY AUTOMATED COUNT: 17.5 % (ref 11.9–14.6)
GLOBULIN SER CALC-MCNC: 1.9 G/DL (ref 2.8–4.5)
GLUCOSE SERPL-MCNC: 94 MG/DL (ref 65–100)
HCT VFR BLD AUTO: 23.6 % (ref 41.1–50.3)
HCT VFR BLD AUTO: 23.8 % (ref 41.1–50.3)
HGB BLD-MCNC: 7.4 G/DL (ref 13.6–17.2)
HGB BLD-MCNC: 7.6 G/DL (ref 13.6–17.2)
HISTORY CHECK: NORMAL
IMM GRANULOCYTES # BLD AUTO: 0.1 K/UL (ref 0–0.5)
IMM GRANULOCYTES NFR BLD AUTO: 1 % (ref 0–5)
LYMPHOCYTES # BLD: 2.6 K/UL (ref 0.5–4.6)
LYMPHOCYTES NFR BLD: 15 % (ref 13–44)
MCH RBC QN AUTO: 29.5 PG (ref 26.1–32.9)
MCHC RBC AUTO-ENTMCNC: 31.9 G/DL (ref 31.4–35)
MCV RBC AUTO: 92.2 FL (ref 82–102)
MONOCYTES # BLD: 2 K/UL (ref 0.1–1.3)
MONOCYTES NFR BLD: 12 % (ref 4–12)
NEUTS SEG # BLD: 12.4 K/UL (ref 1.7–8.2)
NEUTS SEG NFR BLD: 71 % (ref 43–78)
NRBC # BLD: 0 K/UL (ref 0–0.2)
PLATELET # BLD AUTO: 271 K/UL (ref 150–450)
PMV BLD AUTO: 9.8 FL (ref 9.4–12.3)
POTASSIUM SERPL-SCNC: 4.3 MMOL/L (ref 3.5–5.1)
PROT SERPL-MCNC: 3.9 G/DL (ref 6.3–8.2)
RBC # BLD AUTO: 2.58 M/UL (ref 4.23–5.6)
SERVICE CMNT-IMP: NORMAL
SERVICE CMNT-IMP: NORMAL
SODIUM SERPL-SCNC: 141 MMOL/L (ref 136–146)
WBC # BLD AUTO: 17.4 K/UL (ref 4.3–11.1)

## 2024-03-06 PROCEDURE — 2580000003 HC RX 258: Performed by: NURSE PRACTITIONER

## 2024-03-06 PROCEDURE — 6370000000 HC RX 637 (ALT 250 FOR IP): Performed by: NURSE PRACTITIONER

## 2024-03-06 PROCEDURE — 80053 COMPREHEN METABOLIC PANEL: CPT

## 2024-03-06 PROCEDURE — 6370000000 HC RX 637 (ALT 250 FOR IP): Performed by: PHYSICIAN ASSISTANT

## 2024-03-06 PROCEDURE — 97161 PT EVAL LOW COMPLEX 20 MIN: CPT

## 2024-03-06 PROCEDURE — 94660 CPAP INITIATION&MGMT: CPT

## 2024-03-06 PROCEDURE — 85014 HEMATOCRIT: CPT

## 2024-03-06 PROCEDURE — 85018 HEMOGLOBIN: CPT

## 2024-03-06 PROCEDURE — 1100000000 HC RM PRIVATE

## 2024-03-06 PROCEDURE — 97530 THERAPEUTIC ACTIVITIES: CPT

## 2024-03-06 PROCEDURE — 94640 AIRWAY INHALATION TREATMENT: CPT

## 2024-03-06 PROCEDURE — P9016 RBC LEUKOCYTES REDUCED: HCPCS

## 2024-03-06 PROCEDURE — 6360000002 HC RX W HCPCS: Performed by: STUDENT IN AN ORGANIZED HEALTH CARE EDUCATION/TRAINING PROGRAM

## 2024-03-06 PROCEDURE — 97165 OT EVAL LOW COMPLEX 30 MIN: CPT

## 2024-03-06 PROCEDURE — 85025 COMPLETE CBC W/AUTO DIFF WBC: CPT

## 2024-03-06 PROCEDURE — 36415 COLL VENOUS BLD VENIPUNCTURE: CPT

## 2024-03-06 PROCEDURE — 97110 THERAPEUTIC EXERCISES: CPT

## 2024-03-06 PROCEDURE — 2700000000 HC OXYGEN THERAPY PER DAY

## 2024-03-06 PROCEDURE — 2580000003 HC RX 258: Performed by: STUDENT IN AN ORGANIZED HEALTH CARE EDUCATION/TRAINING PROGRAM

## 2024-03-06 PROCEDURE — 2580000003 HC RX 258: Performed by: HOSPITALIST

## 2024-03-06 PROCEDURE — 6370000000 HC RX 637 (ALT 250 FOR IP): Performed by: INTERNAL MEDICINE

## 2024-03-06 PROCEDURE — 36430 TRANSFUSION BLD/BLD COMPNT: CPT

## 2024-03-06 PROCEDURE — 99232 SBSQ HOSP IP/OBS MODERATE 35: CPT | Performed by: INTERNAL MEDICINE

## 2024-03-06 PROCEDURE — 6360000002 HC RX W HCPCS: Performed by: NURSE PRACTITIONER

## 2024-03-06 PROCEDURE — 94761 N-INVAS EAR/PLS OXIMETRY MLT: CPT

## 2024-03-06 RX ORDER — FLUTICASONE PROPIONATE AND SALMETEROL 100; 50 UG/1; UG/1
1 POWDER RESPIRATORY (INHALATION) EVERY 12 HOURS
Qty: 1 EACH | Refills: 3 | Status: SHIPPED | OUTPATIENT
Start: 2024-03-06

## 2024-03-06 RX ORDER — ALBUTEROL SULFATE 90 UG/1
2 AEROSOL, METERED RESPIRATORY (INHALATION) EVERY 6 HOURS PRN
Status: DISCONTINUED | OUTPATIENT
Start: 2024-03-06 | End: 2024-03-07 | Stop reason: HOSPADM

## 2024-03-06 RX ORDER — TIOTROPIUM BROMIDE 18 UG/1
18 CAPSULE ORAL; RESPIRATORY (INHALATION) DAILY
Qty: 90 CAPSULE | Refills: 3 | Status: SHIPPED | OUTPATIENT
Start: 2024-03-06

## 2024-03-06 RX ORDER — SODIUM CHLORIDE 9 MG/ML
INJECTION, SOLUTION INTRAVENOUS PRN
Status: DISCONTINUED | OUTPATIENT
Start: 2024-03-06 | End: 2024-03-07 | Stop reason: HOSPADM

## 2024-03-06 RX ORDER — PREDNISONE 20 MG/1
20 TABLET ORAL DAILY
Status: DISCONTINUED | OUTPATIENT
Start: 2024-03-07 | End: 2024-03-07 | Stop reason: HOSPADM

## 2024-03-06 RX ORDER — PANTOPRAZOLE SODIUM 40 MG/1
40 TABLET, DELAYED RELEASE ORAL
Qty: 60 TABLET | Refills: 1 | Status: SHIPPED | OUTPATIENT
Start: 2024-03-06 | End: 2024-05-05

## 2024-03-06 RX ADMIN — IPRATROPIUM BROMIDE AND ALBUTEROL SULFATE 1 DOSE: 2.5; .5 SOLUTION RESPIRATORY (INHALATION) at 11:37

## 2024-03-06 RX ADMIN — MOMETASONE FUROATE AND FORMOTEROL FUMARATE DIHYDRATE 2 PUFF: 200; 5 AEROSOL RESPIRATORY (INHALATION) at 19:56

## 2024-03-06 RX ADMIN — IPRATROPIUM BROMIDE AND ALBUTEROL SULFATE 1 DOSE: 2.5; .5 SOLUTION RESPIRATORY (INHALATION) at 07:50

## 2024-03-06 RX ADMIN — IPRATROPIUM BROMIDE AND ALBUTEROL SULFATE 1 DOSE: 2.5; .5 SOLUTION RESPIRATORY (INHALATION) at 15:47

## 2024-03-06 RX ADMIN — SODIUM CHLORIDE: 9 INJECTION, SOLUTION INTRAVENOUS at 06:51

## 2024-03-06 RX ADMIN — WATER 40 MG: 1 INJECTION INTRAMUSCULAR; INTRAVENOUS; SUBCUTANEOUS at 09:37

## 2024-03-06 RX ADMIN — PANTOPRAZOLE SODIUM 40 MG: 40 TABLET, DELAYED RELEASE ORAL at 17:08

## 2024-03-06 RX ADMIN — SODIUM CHLORIDE, PRESERVATIVE FREE 10 ML: 5 INJECTION INTRAVENOUS at 20:48

## 2024-03-06 RX ADMIN — BUDESONIDE 500 MCG: 0.5 INHALANT RESPIRATORY (INHALATION) at 07:50

## 2024-03-06 RX ADMIN — SODIUM CHLORIDE, PRESERVATIVE FREE 10 ML: 5 INJECTION INTRAVENOUS at 09:38

## 2024-03-06 RX ADMIN — PANTOPRAZOLE SODIUM 40 MG: 40 TABLET, DELAYED RELEASE ORAL at 05:45

## 2024-03-06 RX ADMIN — FLUTICASONE PROPIONATE 2 SPRAY: 50 SPRAY, METERED NASAL at 09:38

## 2024-03-06 NOTE — ANESTHESIA POSTPROCEDURE EVALUATION
Department of Anesthesiology  Postprocedure Note    Patient: Tay Conklin  MRN: [id number]  YOB: 1960  Date of evaluation: 3/6/2024    Procedure Summary     Date: 03/05/24 Room / Location: [facility] ENDO FLOURO 1 / [facility] ENDOSCOPY    Anesthesia Start: 1354 Anesthesia Stop: 1426    Procedures:       COLONOSCOPY DIAGNOSTIC      ESOPHAGOGASTRODUODENOSCOPY (Upper GI Region) Diagnosis:       Hematochezia      (Hematochezia [K92.1])    Surgeons: Tono Napoles MD Responsible Provider: Andrew Vogt MD    Anesthesia Type: TIVA ASA Status: 4          Anesthesia Type: TIVA    Kristin Phase I: Kristin Score: 9    Kristin Phase II:      Anesthesia Post Evaluation    Patient location during evaluation: PACU  Patient participation: complete - patient participated  Level of consciousness: sleepy but conscious  Airway patency: patent  Nausea & Vomiting: no nausea and no vomiting  Cardiovascular status: blood pressure returned to baseline and hemodynamically stable  Respiratory status: acceptable  Hydration status: euvolemic  There was medical reason for not using a multimodal analgesia pain management approach.Pain management: adequate        No notable events documented.

## 2024-03-06 NOTE — CARE COORDINATION
MSN, CM:  patient on 6L NC today.  Patient had an EGD and Colonoscopy yesterday which was unremarkable.  Patient awaiting PT/OT for discharge recommendations.  Case Management will continue to follow.

## 2024-03-06 NOTE — THERAPY EVALUATION
endurance/tolerance levels, as well as need for high level skilled assistance to complete functional transfers/mobility and functional tasks  Therapeutic Activity (11 Minutes): Patient participated in therapeutic activities including bed mobility training, functional transfer training, functional mobility of household distances, functional mobility of community distances, sitting tolerance activity, and standing tolerance activity with minimal assistance, verbal cues, tactile cues, and education in order to increase independence, increase safety awareness, increase activity tolerance, prepare for functional activity, and prepare for functional transfers.     TREATMENT GRID:  N/A    AFTER TREATMENT PRECAUTIONS: Bed/Chair Locked, Call light within reach, Chair, Needs within reach, and RN notified    INTERDISCIPLINARY COLLABORATION:  RN/ PCT, PT/ PTA, and OT/ SMITH    EDUCATION:  Education Given To: Patient  Education Provided: Role of Therapy;Plan of Care;Energy Conservation  Education Method: Verbal;Demonstration  Education Outcome: Verbalized understanding;Demonstrated understanding    TOTAL TREATMENT DURATION AND TIME:  Time In: 1416  Time Out: 1435  Minutes: 19    CRYSTAL COCHRAN, OT

## 2024-03-06 NOTE — DISCHARGE INSTRUCTIONS
DISCHARGE SUMMARY from Nurse    PATIENT INSTRUCTIONS:    After general anesthesia or intravenous sedation, for 24 hours or while taking prescription Narcotics:  Limit your activities  Do not drive and operate hazardous machinery  Do not make important personal or business decisions  Do  not drink alcoholic beverages  If you have not urinated within 8 hours after discharge, please contact your surgeon on call.    Report the following to your surgeon:  Excessive pain, swelling, redness or odor of or around the surgical area  Temperature over 100.5  Nausea and vomiting lasting longer than 4 hours or if unable to take medications  Any signs of decreased circulation or nerve impairment to extremity: change in color, persistent  numbness, tingling, coldness or increase pain  Any questions    What to do at Home:  Recommended activity: activity as tolerated    If you experience any of the following symptoms shortness of breath not relieved by medications, pain not relieved by rest, chest pain or pressure, temperature greater than 101, nausea, vomiting, diarrhea, or increase in weakness fatigue or swelling please follow up with MD.    *  Please give a list of your current medications to your Primary Care Provider.    *  Please update this list whenever your medications are discontinued, doses are      changed, or new medications (including over-the-counter products) are added.    *  Please carry medication information at all times in case of emergency situations.    These are general instructions for a healthy lifestyle:    No smoking/ No tobacco products/ Avoid exposure to second hand smoke  Surgeon General's Warning:  Quitting smoking now greatly reduces serious risk to your health.    Obesity, smoking, and sedentary lifestyle greatly increases your risk for illness    A healthy diet, regular physical exercise & weight monitoring are important for maintaining a healthy lifestyle    You may be retaining fluid if you have

## 2024-03-06 NOTE — PLAN OF CARE
Problem: Discharge Planning  Goal: Discharge to home or other facility with appropriate resources  Outcome: Adequate for Discharge     Problem: Safety - Adult  Goal: Free from fall injury  Outcome: Adequate for Discharge     Problem: Skin/Tissue Integrity  Goal: Absence of new skin breakdown  Description: 1.  Monitor for areas of redness and/or skin breakdown  2.  Assess vascular access sites hourly  3.  Every 4-6 hours minimum:  Change oxygen saturation probe site  4.  Every 4-6 hours:  If on nasal continuous positive airway pressure, respiratory therapy assess nares and determine need for appliance change or resting period.  Outcome: Adequate for Discharge     Problem: Respiratory - Adult  Goal: Achieves optimal ventilation and oxygenation  Outcome: Adequate for Discharge     Problem: ABCDS Injury Assessment  Goal: Absence of physical injury  Outcome: Adequate for Discharge     Problem: Pain  Goal: Verbalizes/displays adequate comfort level or baseline comfort level  Outcome: Adequate for Discharge

## 2024-03-07 VITALS
DIASTOLIC BLOOD PRESSURE: 76 MMHG | HEIGHT: 74 IN | OXYGEN SATURATION: 91 % | RESPIRATION RATE: 18 BRPM | BODY MASS INDEX: 40.43 KG/M2 | HEART RATE: 84 BPM | WEIGHT: 315 LBS | TEMPERATURE: 97.3 F | SYSTOLIC BLOOD PRESSURE: 116 MMHG

## 2024-03-07 PROBLEM — R79.89 ELEVATED TROPONIN LEVEL NOT DUE MYOCARDIAL INFARCTION: Status: RESOLVED | Noted: 2024-03-01 | Resolved: 2024-03-07

## 2024-03-07 PROBLEM — J44.1 COPD EXACERBATION (HCC): Status: ACTIVE | Noted: 2024-03-01

## 2024-03-07 LAB
ABO + RH BLD: NORMAL
BLD PROD TYP BPU: NORMAL
BLOOD BANK BLOOD PRODUCT EXPIRATION DATE: NORMAL
BLOOD BANK DISPENSE STATUS: NORMAL
BLOOD BANK ISBT PRODUCT BLOOD TYPE: 6200
BLOOD BANK PRODUCT CODE: NORMAL
BLOOD BANK PRODUCT CODE: NORMAL
BLOOD BANK UNIT TYPE AND RH: NORMAL
BLOOD GROUP ANTIBODIES SERPL: NORMAL
BPU ID: NORMAL
CROSSMATCH RESULT: NORMAL
SPECIMEN EXP DATE BLD: NORMAL
UNIT DIVISION: 0
UNIT ISSUE DATE/TIME: NORMAL

## 2024-03-07 PROCEDURE — 94760 N-INVAS EAR/PLS OXIMETRY 1: CPT

## 2024-03-07 PROCEDURE — 2700000000 HC OXYGEN THERAPY PER DAY

## 2024-03-07 PROCEDURE — 97530 THERAPEUTIC ACTIVITIES: CPT

## 2024-03-07 PROCEDURE — 94640 AIRWAY INHALATION TREATMENT: CPT

## 2024-03-07 PROCEDURE — 94761 N-INVAS EAR/PLS OXIMETRY MLT: CPT

## 2024-03-07 PROCEDURE — 6370000000 HC RX 637 (ALT 250 FOR IP): Performed by: NURSE PRACTITIONER

## 2024-03-07 PROCEDURE — 6370000000 HC RX 637 (ALT 250 FOR IP): Performed by: PHYSICIAN ASSISTANT

## 2024-03-07 PROCEDURE — 2580000003 HC RX 258: Performed by: STUDENT IN AN ORGANIZED HEALTH CARE EDUCATION/TRAINING PROGRAM

## 2024-03-07 RX ORDER — PREDNISONE 10 MG/1
TABLET ORAL
Qty: 7 TABLET | Refills: 0 | Status: SHIPPED | OUTPATIENT
Start: 2024-03-08 | End: 2024-03-13

## 2024-03-07 RX ADMIN — MOMETASONE FUROATE AND FORMOTEROL FUMARATE DIHYDRATE 2 PUFF: 200; 5 AEROSOL RESPIRATORY (INHALATION) at 07:30

## 2024-03-07 RX ADMIN — TIOTROPIUM BROMIDE INHALATION SPRAY 2 PUFF: 3.12 SPRAY, METERED RESPIRATORY (INHALATION) at 07:30

## 2024-03-07 RX ADMIN — PREDNISONE 20 MG: 20 TABLET ORAL at 09:11

## 2024-03-07 RX ADMIN — SODIUM CHLORIDE, PRESERVATIVE FREE 10 ML: 5 INJECTION INTRAVENOUS at 09:25

## 2024-03-07 RX ADMIN — PANTOPRAZOLE SODIUM 40 MG: 40 TABLET, DELAYED RELEASE ORAL at 06:16

## 2024-03-07 NOTE — DISCHARGE SUMMARY
Recent Labs     03/05/24  1009 03/06/24  0607    141   K 4.6 4.3   * 103   CO2 39* 39*   ANIONGAP 0* NEG 1   BUN 59* 37*   CREATININE 1.10 0.90   LABGLOM >60 >60   CALCIUM 8.2* 8.1*   GLUCOSE 98 94   MG 1.9  --       CBC Recent Labs     03/05/24  1009 03/05/24  1613 03/05/24  2317 03/06/24  0607 03/06/24  1056   WBC 18.3*  --   --  17.4*  --    RBC 2.66*  --   --  2.58*  --    HGB 7.6*   < > 6.7* 7.6* 7.4*   HCT 24.0*   < > 21.6* 23.8* 23.6*   MCV 90.2  --   --  92.2  --    MCH 28.6  --   --  29.5  --    MCHC 31.7  --   --  31.9  --    RDW 17.6*  --   --  17.5*  --      --   --  271  --    MPV 10.0  --   --  9.8  --    NRBC 0.00  --   --  0.00  --    LYMPHOPCT 14  --   --  15  --    EOSRELPCT 1  --   --  1  --    MONOPCT 12  --   --  12  --    BASOPCT 0  --   --  0  --    IMMGRAN 1  --   --  1  --    LYMPHSABS 2.5  --   --  2.6  --    EOSABS 0.2  --   --  0.3  --    MONOSABS 2.1*  --   --  2.0*  --    BASOSABS 0.0  --   --  0.0  --    ABSIMMGRAN 0.1  --   --  0.1  --     < > = values in this interval not displayed.      LFT Recent Labs     03/05/24  1009 03/06/24  0607   BILITOT 1.4* 0.9   ALKPHOS 23* 25*   AST 24 28   ALT 27 31   PROT 3.9* 3.9*   LABALBU 2.1* 2.0*   GLOB 1.8* 1.9*      Cardiac  Lab Results   Component Value Date/Time    NTPROBNP 733 03/04/2024 04:03 AM    NTPROBNP 2,536 03/01/2024 02:45 PM    TROPHS 84.9 03/01/2024 05:32 PM    TROPHS 88.1 03/01/2024 02:45 PM      Coags No results found for: \"PROTIME\", \"INR\", \"APTT\"   A1c Lab Results   Component Value Date/Time    LABA1C 6.1 09/22/2023 12:00 AM      Lipids No results found for: \"CHOL\", \"LDLCALC\", \"HDL\", \"CHOLHDLRATIO\", \"TRIG\"   Thyroid  Lab Results   Component Value Date/Time    TSHELE 1.03 03/04/2024 04:03 AM        Most Recent UA No results found for: \"COLORU\", \"APPEARANCE\", \"SPECGRAV\", \"LABPH\", \"PROTEINU\", \"GLUCOSEU\", \"KETUA\", \"BILIRUBINUR\", \"BLOODU\", \"UROBILINOGEN\", \"NITRU\", \"LEUKOCYTESUR\", \"WBCUA\", \"RBCUA\", \"EPITHUA\",

## 2024-03-07 NOTE — CARE COORDINATION
MSN, CM:  patient to be discharged home today with no services ordered or requested.  Patient will require home oxygen which will be provided by [facility].  Patient and family agree with this discharge plan.  Patient has met all milestones for this admission.  Family to transport patient home.       03/07/24 1203   Service Assessment   Patient Orientation Alert and Oriented   Cognition Alert   Services At/After Discharge   Transition of Care Consult (CM Consult) Other  (home oxygen - [facility]\)   Mode of Transport at Discharge Other (see comment)  (family to transport)   Confirm Follow Up Transport Self   Condition of Participation: Discharge Planning   The Plan for Transition of Care is related to the following treatment goals: home oxygen   The Patient and/or Patient Representative was provided with a Choice of Provider? Patient   The Patient and/Or Patient Representative agree with the Discharge Plan? Yes   Freedom of Choice list was provided with basic dialogue that supports the patient's individualized plan of care/goals, treatment preferences, and shares the quality data associated with the providers?  Yes

## 2024-03-07 NOTE — PROGRESS NOTES
Hospitalist Progress Note   Admit Date:  3/1/2024  2:16 PM   Name:  Tay Conklin   Age:  63 y.o.  Sex:  male  :  1960   MRN:  [id number]   Room:  ENDO/PL    Presenting/Chief Complaint: Shortness of Breath, Leg Swelling, and ascites     Reason(s) for Admission: Bronchitis [J40]  Noncompliance with medication regimen [Z91.148]  DONYA (acute kidney injury) (HCC) [N17.9]  Acute on chronic diastolic congestive heart failure (HCC) [I50.33]  Acute respiratory failure with hypoxia and hypercapnia (HCC) [J96.01, J96.02]     Hospital Course:   Tay Conklin is a 63 y.o. CM w/ a PMH of COPD, LILA on CPAP, pulmonary HTN, umbilical hernia who was admitted on 3/01 for hypercapnic respiratory failure due to untreated COPD and non-compliance with CPAP.  Currently on AirVo.       He was transferred to the medical floor overnight Mar/02.  Hospitalists asked to assume primary role.  Pulmonology is on board.    The patient started having GI bleeding with hematochezia noted.  He has had precipitous drop in Hgb and has required PRBC transfusions.  His BP also dropped.  GI was consulted and performed scopes on .  No active bleeding was seen however old blood and clots were noted throughout the colon.  GI signed off.  Continue serial H&H for now.    Subjective & 24hr Events:   Overnight, patient was not compliant w/ bowel prep.  Also, hgb continued to decrease and was transfused PRBC and given a 250 mL NS bolus.  Continue close monitoring of BP.  Continue H&H every 6 hours until stability or uptrend noted.  He continues to require Airvo.  Follow-up on Pulmonology recommendations.  Trend CBC and CMP in the morning.    Assessment & Plan:     Acute respiratory failure with hypoxia and hypercapnia (HCC)  Pulm HTN  Per Pulmonology  TTE on Mar/02: 55-60% LVEF, normal diastolic, RVSP 75 mmHg    COPD exacerbation  Per Pulmonology  On IV steroid    LILA (obstructive sleep apnea)  CPAP  Needs updated 
       Hospitalist Progress Note   Admit Date:  3/1/2024  2:16 PM   Name:  Tay Conklin   Age:  63 y.o.  Sex:  male  :  1960   MRN:  [id number]   Room:  [facility]/    Presenting/Chief Complaint: Shortness of Breath, Leg Swelling, and ascites     Reason(s) for Admission: Bronchitis [J40]  Noncompliance with medication regimen [Z91.148]  DONYA (acute kidney injury) (HCC) [N17.9]  Acute on chronic diastolic congestive heart failure (HCC) [I50.33]  Acute respiratory failure with hypoxia and hypercapnia (HCC) [J96.01, J96.02]     Hospital Course:   Tay Conklin is a 63 y.o. CM w/ a PMH of COPD, LILA on CPAP, pulmonary HTN, umbilical hernia who was admitted on 3/01 for hypercapnic respiratory failure due to untreated COPD and non-compliance with CPAP.  Currently on AirVo.       He was transferred to the medical floor overnight Mar/02.  Hospitalists asked to assume primary role.  Pulmonology is on board.    Subjective & 24hr Events:   Patient is sitting up in the chair on AirVo 45%, 50 L.  He does endorse maroon-colored stool.  FOBT positive and BUN/creatinine ratio seems to indicate possible upper GI bleed.  Gastroenterology saw the patient and will plan for EGD/Tyrone in the morning.  I reviewed this plan with the patient and he indicated that he would likely refuse.  RN later informed me that he now seems agreeable.  Continue to trend H&H.  Continue IVF and clear liquids today.  N.p.o. past midnight.    Assessment & Plan:     Acute respiratory failure with hypoxia and hypercapnia (HCC)  Pulm HTN  Per Pulmonology    COPD exacerbation  Per Pulmonology  On IV steroid    LILA (obstructive sleep apnea)  CPAP  Needs updated polysomnography    Polycythemia  Noted on admission.  Likely due to chronic hypoxia.  S/p phlebotomy while here.   Blood cultures negative x 3 days     Acute kidney injury (HCC)  Resolved on Mar/04     Acute GI bleed  FOBT (+)  Maroon stool per patient  PPI IV BID (his BUN is elevated)  IVF  Serial H&H  GI 
   03/01/24 1512   NIV Type   $NIV $Daily Charge   Ventilator ID [id number]   Suction Setup and Functional Yes   NIV Started/Stopped On   Equipment Type   (V60)   Mode Bilevel   Mask Type Under the nose   Assessment   Pulse 90   Respirations 22   SpO2 93 %   Level of Consciousness 1   Comfort Level Good   Using Accessory Muscles No   Mask Compliance Good   Breath Sounds   Breath Sounds Bilateral Diminished   Settings/Measurements   PIP Observed 18 cm H20   IPAP 18 cmH20   CPAP/EPAP 10 cmH2O   Vt (Measured) 371 mL   Rate Ordered 22   Insp Rise Time (%) 4 %   FiO2  60 %   I Time/ I Time % 4 s   Minute Volume (L/min) 8.8 Liters   Mask Leak (lpm) 35 lpm   Patient's Home Machine No   Alarm Settings   Alarms On Y   Low Pressure (cmH2O) 5 cmH2O   High Pressure (cmH2O) 35 cmH2O   Apnea (secs) 20 secs   RR Low (bpm) 5   RR High (bpm) 50 br/min     Patient resting on BIPAP initial settings 18/10 RR of 22 and FiO2 of 60%. Alarms on and nurse alarm connected.  
   03/01/24 1837   NIV Type   $NIV $Daily Charge   NIV Started/Stopped On   Mode Bilevel   Mask Type Nasal mask   Breath Sounds   Breath Sounds Bilateral Diminished   Settings/Measurements   PIP Observed 20 cm H20   IPAP 20 cmH20   CPAP/EPAP 10 cmH2O   Vt (Measured) 624 mL   Rate Ordered 22   FiO2  60 %   Minute Volume (L/min) 17 Liters   Patient's Home Machine No   Alarm Settings   Alarms On Y   Low Pressure (cmH2O) 5 cmH2O   High Pressure (cmH2O) 35 cmH2O   Delay Alarm 20 sec(s)   Apnea (secs) 20 secs   RR Low (bpm) 5   RR High (bpm) 50 br/min       
   03/04/24 0037   NIV Type   $NIV $Daily Charge   NIV Started/Stopped On   Equipment Type V60   Mode Bilevel   Mask Type Under the nose   Mask Size Large   Assessment   Pulse 98   Respirations 22   SpO2 100 %   Comfort Level Good   Using Accessory Muscles No   Mask Compliance Good   Skin Assessment Clean, dry, & intact   Skin Protection for O2 Device N/A   Settings/Measurements   PIP Observed 23 cm H20   IPAP 24 cmH20   CPAP/EPAP 10 cmH2O   Vt (Measured) 807 mL   Rate Ordered 24   Insp Rise Time (%) 4 %   FiO2  50 %   I Time/ I Time % 0.9 s   Minute Volume (L/min) 25.2 Liters   Mask Leak (lpm) 0 lpm   Patient's Home Machine No   Alarm Settings   Alarms On Y   Low Pressure (cmH2O) 5 cmH2O   High Pressure (cmH2O) 35 cmH2O   Delay Alarm 20 sec(s)   Apnea (secs) 20 secs   RR Low (bpm) 5   RR High (bpm) 50 br/min     Patient placed on V60. Connected to the Nurse Call System and Continuous Pulse Oximetry utilizing Vital Sync. Alarms are activated and nurse has been notified. Documentation completed.   
  Harshad Quinones/[facility] Pulmonary Daily Progress Note:: 3/3/2024  Tay Conklin  Admission Date: 3/1/2024     Length of Stay: 2 days    Background: 63 y.o. male presents to ER via EMS from home with shortness of breath, edema, and altered mental status. He has a PMH of COPD, has not been treated and was supposed to see us as a new patient in January but did not show for appointment. He states he quit smoking 12 years ago and recently stopped drinking. He tells me he has been seeing [facility] and is supposed to be seeing pulmonary and cardiology. Has had CPAP to wear nightly for about 2 years but does not like it. He remains on BIPAP and is not consistent with providing history. Significant other and daughter at bedside. He states he was put on a couple different inhalers but they are very expensive.      Was started on Airvo but transitioned to BIPAP due to ABG with pH 7.25 and pCO2 70. Polycythemia seen on CBC, Hgb 18.4 likely chronically hypoxic. Significant other states his O2 sats are in the 60s at home sometimes and he is always sleepy. Reports a +20 lb weight gain in a month timeframe. He has anasarca with edema in BLE, scrotum, abdomen, face, and orbital. Significant other states he has had trouble urinating due to swelling in scrotum. proBNP was 2536. Significant other states he does have a history of CHF, no ECHO for review. Minimal documented history in EPIC for review.    Notable PMH:  has no past medical history on file.    24 Hour events: sent to floor using our V60 at night. On Airvo at 50/60% at this time. Feels stronger. Does have itchy eyes and needs his allegra and flonase. Has a BIPAP with oxygn at home. Leg edema is less. Did get plebotomy yesterday with \"a lot of blood taken out per patient\"    Review of Systems: Comprehensive ROS negative except in HPI    Lines: (insertion date)      Drips: current dose (range)        Pertinent Exam:         Blood pressure 126/81, pulse 94, 
  Harshad Quinones/[facility] Critical Care Note:: 3/2/2024  Tay Conklin  Admission Date: 3/1/2024     Length of Stay: 1 days    Background: 63 y.o. male presents to ER via EMS from home with shortness of breath, edema, and altered mental status. He has a PMH of COPD, has not been treated and was supposed to see us as a new patient in January but did not show for appointment. He states he quit smoking 12 years ago and recently stopped drinking. He tells me he has been seeing [facility] and is supposed to be seeing pulmonary and cardiology. Has had CPAP to wear nightly for about 2 years but does not like it. He remains on BIPAP and is not consistent with providing history. Significant other and daughter at bedside. He states he was put on a couple different inhalers but they are very expensive.      Was started on Airvo but transitioned to BIPAP due to ABG with pH 7.25 and pCO2 70. Polycythemia seen on CBC, Hgb 18.4 likely chronically hypoxic. Significant other states his O2 sats are in the 60s at home sometimes and he is always sleepy. Reports a +20 lb weight gain in a month timeframe. He has anasarca with edema in BLE, scrotum, abdomen, face, and orbital. Significant other states he has had trouble urinating due to swelling in scrotum. proBNP was 2536. Significant other states he does have a history of CHF, no ECHO for review. Minimal documented history in EPIC for review.    Notable PMH:  has no past medical history on file.    24 Hour events: Remains on BIPAP, ABG unchanged. Diuresed 1.3L since admit. Feels better. Less swollen, less congested in chest. Alert. Feels thinking is clearer.     Review of Systems: Comprehensive ROS negative except in HPI    Lines: (insertion date)      Drips: current dose (range)        Pertinent Exam:         Blood pressure 115/64, pulse 97, temperature 97.8 °F (36.6 °C), temperature source Axillary, resp. rate 17, height 1.88 m (6' 2\"), weight (!) 162.1 kg (357 lb 5.9 
 completed follow up visit.  Patient is optimistic about his recovery.   provided pastoral presence, prayer and empathetic listening.  Peace be with you,  Signed by  KAYLAH HardinDiv.   [phone]  
 completed initial visit with patient.  Sister was at bedside and supportive.  Patient engaged in brief spiritual reflection before returning to his breakfast.   provided pastoral presence, prayer and empathetic listening.  Peace be with you,  Signed by  KAYLAH HardinDiv.   [phone]  
 received consult for HCPOA, however patient was in endoscopy at time.  Due to patient being under anesthesia today,  will defer HCPOA conversation until patient has recovered.    Peace be with you,  Signed by  KAYLAH HardinDiv.   [phone]    
4 Eyes Skin Assessment     NAME:  Tay Conklin  YOB: 1960  MEDICAL RECORD NUMBER:  [id number]    The patient is being assessed for  Admission    I agree that at least one RN has performed a thorough Head to Toe Skin Assessment on the patient. ALL assessment sites listed below have been assessed.      Areas assessed by both nurses:    Head, Face, Ears, Shoulders, Back, Chest, Arms, Elbows, Hands, Sacrum. Buttock, Coccyx, Ischium, and Legs. Feet and Heels        Does the Patient have a Wound? No noted wound(s), but pt noted to have excoriation in groin along with diffuse edema generally       Jeremy Prevention initiated by RN: Yes  Wound Care Orders initiated by RN: No    Pressure Injury (Stage 3,4, Unstageable, DTI, NWPT, and Complex wounds) if present, place Wound referral order by RN under : No    New Ostomies, if present place, Ostomy referral order under : No     Nurse 1 eSignature: Electronically signed by Eden Walter RN on 3/1/24 at 7:27 PM EST    **SHARE this note so that the co-signing nurse can place an eSignature**    Nurse 2 eSignature: Electronically signed by Irwin Jolley RN on 3/1/24 at 7:30 PM EST    
ACUTE PHYSICAL THERAPY GOALS:   (Developed with and agreed upon by patient and/or caregiver.)       LTG:  (1.)Mr. Conklin will move from supine to sit and sit to supine , scoot up and down, and roll side to side in bed with INDEPENDENT within 7 treatment day(s).    (2.)Mr. Conklin will transfer from bed to chair and chair to bed with INDEPENDENT using the least restrictive device within 7 treatment day(s).    (3.)Mr. Conklin will ambulate with INDEPENDENT for 750 feet with the least restrictive device within 7 treatment day(s).  (4.)Mr. Conklin will tolerate at least 23 min of dynamic standing activity to assist standing ADLs with the least restrictive device within 7 treatment days.       PHYSICAL THERAPY: Daily Note AM   (Link to Caseload Tracking: PT Visit Days : 2  Time In/Out PT Charge Capture  Rehab Caseload Tracker  Orders    Tay Conklin is a 63 y.o. male   PRIMARY DIAGNOSIS: Acute respiratory failure with hypoxia and hypercapnia (HCC)  Bronchitis [J40]  Noncompliance with medication regimen [Z91.148]  DONYA (acute kidney injury) (HCC) [N17.9]  Acute on chronic diastolic congestive heart failure (HCC) [I50.33]  Acute respiratory failure with hypoxia and hypercapnia (HCC) [J96.01, J96.02]  Procedure(s) (LRB):  COLONOSCOPY DIAGNOSTIC (N/A)  ESOPHAGOGASTRODUODENOSCOPY (N/A)  2 Days Post-Op  Inpatient: Payor: [facility] / Plan: [employer] PLU / Product Type: *No Product type* /     ASSESSMENT:     REHAB RECOMMENDATIONS:   Recommendation to date pending progress:  Setting:  No further skilled physical therapy after discharge from hospital    Equipment:    None     ASSESSMENT:  Mr. Conklin presents in bedside chair on 3L and moves w/ supervision. He requires 4L during ambulation of 2 x 500' intermixed w/ seated rest break. During ambulation, frequent standing rest breaks are utilized as he frequently de-saturates in the 70s, RN aware. Overall, good progress in ambulation distance but 
Advance Care Planning     Advance Care Planning Inpatient Note  [facility] Department    Today's Date: 3/7/2024  Unit: S[facility] 8 MED SURG    Received request from IDT Member.  Upon review of chart and communication with care team, patient's decision making abilities are not in question.. Patient was/were present in the room during visit.    Goals of ACP Conversation:  Discuss advance care planning documents    Health Care Decision Makers:     No healthcare decision makers have been documented.  Click here to complete HealthCare Decision Makers including selection of the Healthcare Decision Maker Relationship (ie \"Primary\")  Summary:  Documented Next of Kin, per patient report    Advance Care Planning Documents (Patient Wishes):  None     Assessment:  Patient declined need for HCPOA.  Patient has five children who share the legal next of kin.  Patient expressed understanding of next of kin's strong and states that family is on the same page for what types of care patient may desire.      Interventions:  Encouraged ongoing ACP conversation with future decision makers and loved ones  Patient DECLINED ACP conversation    Care Preferences Communicated:   No    Outcomes/Plan:  ACP Discussion: Refused    Electronically signed by Chaplain Clarisse on 3/7/2024 at 9:22 AM  
Beside verbal report received from SHAWN Ac to assume care of patient due to downstaffing reasons. Pt Aox4, resting comfortably on airvo. VSS. No signs of distress.  
Blood transfusion consent note    I have discussed with the patient the rationale for blood component transfusion; its benefits in treating or preventing fatigue, organ damage, or death; and its risk which includes mild transfusion reactions, rare risk of blood borne infection, or more serious but rare reactions. I have discussed the alternatives to transfusion, including the risk and consequences of not receiving transfusion. The patient had an opportunity to ask questions and had agreed to proceed with transfusion of blood components.     Christiane Davila M.D.  
Critical lab value . This RN was notified that Hgb was 6.5. Notified MD Davila about hgb and the need for a blood consent from.    Will continue to monitor.  
Critical lab value, hgb 6.7. Notified on call MD Joo Mcnally. Orders for blood were placed.         Will continue to monitor.               
GoLytely given to patient as scheduled. Patient has not started drinking prep. Patient called nurse into room and requested another explanation of what he had to drink. Patient stated that he was not going to be drinking the bowel prep. Nurse explained that we would not be able to complete the test. Patient answered nurse with an expletive and told this RN to get the head nurse and the doctor. Charge RN has been made aware. MD also made aware of patient's bp and most recent Hgb.    Adendum: Patient now agreeable to continuing with procedure. Dr. Chavira has spoken with the patient and patient has started drinking bowel prep.  
MD notified in regards to patient having multiple bloody stools. Hemoglobin trending down. Patient's hemoglobin currently at 12.0. Awaiting results of next H/H.Hospitalist has been called to bedside to assess patient. Patient's blood pressure currently at 110/70. Heart rate in the 110's. Will continue to monitor hemoglobin.  
Order noted for patient to be NPO. PA from GI saw patient and states okay for patient to have ICE Chips. ICE chips given.  
Oxygen Qualifier       Room air: SpO2 with O2 and liter flow   Resting SpO2  82%  85% on 2 L, 93% on 3L   Ambulating SpO2  N/a 84% on 3 L, 87% on 4 L, 92% on 5 L.        Completed by:    Del Meek RCP  
Patient agreeable to try Bipap tonight, however he is to go for a colonoscopy in the morning and has started the prep. RN and RT in agreement that patient would be safest on airvo tonight give the amount of times he is up to the bathroom. RT will add humidification to the V60 in anticipation of use after procedure. Will continue to monitor closely.   
Patient alert, sitting in bed on 4L O2 via NC. Patient denies any pain or SOB at this time. Patient indicates no needs at this time. Encouraged patient to call if they need anything. Tray table and call light are within reach.   
Patient awake and alert, resting in bed on 8L NC. Patient denies any pain, nausea, or SOB and indicates no needs at this time. Patient on continuous pulse oximetry. Tray table and call light within reach.  
Patient is off of bipap mask. Patient is non-compliant and requested to be back on airvo. The importance of keeping the bipap on was explained to patient. Patient is on 50L 50% airvo. No respiratory distress is noted. Nurse is notified.        03/04/24 0216   Oxygen Therapy/Pulse Ox   O2 Therapy Oxygen humidified   O2 Device High flow nasal cannula   O2 Flow Rate (L/min) 50 L/min   FiO2  50 %   Pulse (!) 114   Respirations 20   SpO2 94 %       
Patient off BiPAP, patient declined BiPAP for the rest of the night, patient is on 6 L NC with an O2 saturation of 98%, will continue to monitor.  
Patient placed on V60. Connected to the Nurse Call System and Continuous Pulse Oximetry utilizing Vital Sync. Alarms are activated and nurse has been notified. Documentation completed.      03/05/24 5118   NIV Type   $NIV $Daily Charge   NIV Started/Stopped On   Equipment Type V60   Mode Bilevel   Mask Type Under the nose   Mask Size Large   Assessment   Pulse 93   Respirations 22   SpO2 98 %   Comfort Level Good   Using Accessory Muscles No   Mask Compliance Good   Breath Sounds   Breath Sounds Bilateral Diminished   Settings/Measurements   PIP Observed 21 cm H20   IPAP 20 cmH20   CPAP/EPAP 8 cmH2O   Vt (Measured) 1111 mL   Rate Ordered 20   Insp Rise Time (%) 4 %   FiO2  50 %   I Time/ I Time % 0.85 s   Minute Volume (L/min) 25.3 Liters   Mask Leak (lpm) 15 lpm   Patient's Home Machine No   Alarm Settings   Alarms On Y   Low Pressure (cmH2O) 5 cmH2O   High Pressure (cmH2O) 35 cmH2O   Apnea (secs) 20 secs   RR Low (bpm) 5   RR High (bpm) 50 br/min   Oxygen Therapy/Pulse Ox   O2 Therapy Oxygen   O2 Device PAP (positive airway pressure)       
Patient placed on V60. Connected to the Nurse Call System and Continuous Pulse Oximetry utilizing Vital Sync. Alarms are activated and nurse has been notified. Documentation completed.      03/06/24 2321   NIV Type   NIV Started/Stopped On   Equipment Type V60   Mode Bilevel   Mask Type Under the nose   Mask Size Large   Assessment   Pulse 83   Respirations 20   SpO2 97 %   Comfort Level Good   Using Accessory Muscles No   Mask Compliance Good   Skin Assessment Clean, dry, & intact   Breath Sounds   Breath Sounds Bilateral Diminished   Settings/Measurements   PIP Observed 21 cm H20   IPAP 20 cmH20   CPAP/EPAP 8 cmH2O   Vt (Measured) 1244 mL   Rate Ordered 20   Insp Rise Time (%) 4 %   FiO2  50 %   I Time/ I Time % 0.85 s   Minute Volume (L/min) 25.8 Liters   Mask Leak (lpm) 0 lpm   Patient's Home Machine No   Alarm Settings   Alarms On Y   Low Pressure (cmH2O) 5 cmH2O   High Pressure (cmH2O) 35 cmH2O   Apnea (secs) 20 secs   RR Low (bpm) 5   RR High (bpm) 50 br/min   Oxygen Therapy/Pulse Ox   O2 Therapy Oxygen   O2 Device PAP (positive airway pressure)       
Patient provided with written discharge instructions. Discussed follow up appointments, new medications, and performed Heart Failure education. Removed one IV. Patient verbalized understanding and was able to Patient demonstrate how to operate his new portable O2 tank. Patient escorted to lobby via wheelchair by staff.  
Patient refused to finish bowel prep, educated patient on the benefits of completing medication and the risk associated with not completing medication. Patient stated that he was done and did not need any \"convincing\". Patient has only consumed a little above the half tena of bowel prep.     Will continue to monitor.  
Patient returned to bed. Tolerated well.    Therapeutic phlebotomy performed per Dr. Fernandez. 250 ml blood removed.   
Patient up in chair on 6L O2 via NC, reports no issues overnight. Denies any pain, nausea, or SOB at this time. Reports only wore bipap for about an hour, states the mask is too small for his face. Patient has no needs at this time. Instructed to call for assistance, tray table and call light in reach. Care is ongoing.   
Plan for EGD/Colonoscopy for bleeding evaluation.   
Resipiratory completed an ambulatory O2 sat test. Patient maintained about 90% on 4L at rest, required 6L during exertion to maintain O2 saturations. Notified provider. Discharge was cancelled until patient can maintain O2 saturations on less than 6L. Patient provided an IS and teachback was used to educate and evaluate understanding. Patient was able to return demonstration for the IS.   
Staff calling saying that patient does not want to take his prep.  Had multiple episodes of bleeding since this morning.  Hemoglobin dropped from 10.5 this morning to 8.4.  Also blood pressure of 84/45.    Patient sitting in chair on Airvo at 50% FiO2, complaining that, it was small and it was leaking and uncomfortable.    Explained to patient that I do not have any other commode available to give, only can give what hospital provides.  Explained decrease in hemoglobin, importance of completing the prep for probable colonoscopy tomorrow.  Also explained probably may need blood transfusion if hemoglobin keeps dropping 7 or below.    Ordered bolus of normal saline to 250 cc  Continue hemoglobin/Retacrit every 6 hourly.    Nursing staff at the bedside.    
TRANSFER - IN REPORT:    Verbal report received from SHAWN Feliciano on Tay Conklin  being received from ED for urgent transfer      Report consisted of patient's Situation, Background, Assessment and   Recommendations(SBAR).     Information from the following report(s) Nurse Handoff Report, Adult Overview, Intake/Output, MAR, Recent Results, and Cardiac Rhythm NSR  was reviewed with the receiving nurse.    Opportunity for questions and clarification was provided.      Assessment will be completed upon patient's arrival to unit and care assumed.     
TRANSFER - OUT REPORT:    Verbal report given to Jerrica ESCOBAR on Tay Conklin  being transferred to [facility] for routine progression of patient care       Report consisted of patient's Situation, Background, Assessment and   Recommendations(SBAR).     Information from the following report(s) Nurse Handoff Report, ED Encounter Summary, ED SBAR, Adult Overview, Intake/Output, MAR, and Cardiac Rhythm NSR  was reviewed with the receiving nurse.           Lines:   Peripheral IV 03/01/24 Right Antecubital (Active)   Site Assessment Clean, dry & intact 03/02/24 2315   Line Status Flushed;Capped;Normal saline locked 03/02/24 2315   Line Care Connections checked and tightened 03/02/24 2315   Phlebitis Assessment No symptoms 03/02/24 2315   Infiltration Assessment 0 03/02/24 2315   Alcohol Cap Used Yes 03/02/24 2315   Dressing Status Clean, dry & intact 03/02/24 2315   Dressing Type Transparent 03/02/24 2315       Peripheral IV 03/01/24 Left Antecubital (Active)   Site Assessment Clean, dry & intact 03/02/24 2315   Line Status Flushed;Capped;Normal saline locked 03/02/24 2315   Line Care Connections checked and tightened 03/02/24 2315   Phlebitis Assessment No symptoms 03/02/24 2315   Infiltration Assessment 0 03/02/24 2315   Alcohol Cap Used Yes 03/02/24 2315   Dressing Status Clean, dry & intact 03/02/24 2315   Dressing Type Transparent 03/02/24 2315   Dressing Intervention New 03/01/24 7737        Opportunity for questions and clarification was provided.      Patient transported with:  O2 @ 15lpm and Registered Nurse        
US Guided PIV access    Called for assistance with IV access. Ultrasound was used to find the vein which was compressible and does not have any features of an artery or nerve bundle. Skin was cleaned and disinfected prior to IV puncture. Under real-time ultrasound guidance, peripheral access was obtained in the right forearm using 20 G 1.75\" Peripheral IV catheter x 1 attempt. Blood return was present and IV flushed without difficulty. IV dressing applied, no immediate complications noted, and patient tolerated the procedure well.           
Weaned pt's O2 to 4L, tolerating and maintaining sats 92-94%.  
  Impression: 63 y.o. male admitted with acute respiratory failure with hypoxia and hypercapnia with history of untreated COPD and LILA noncompliant with BiPAP. Has significant edema with proBNP of 2536. Tx with V60/lasix and airvo. Echo noted EF 50-55% with pul htn of 75.  Diuresed and placed on bipap here with imrprovement.  Now with acute GI bleed requiring transfusion s/p EGD and colonoscopy.     Principal Problem:    Acute respiratory failure with hypoxia and hypercapnia (HCC)  Plan: likely also a chronic issue untreated. Is now on 6lpm NC with sats in low 90's. Will need to assess O2 needs with rest and ambulation closer to discharge.   He does have a BIPAP with O2 bleed in at home but was not wearing it for several weeks before arrival. He has been asked to have family bring his home BIPAP into the hospital to make adjustments as needed.     Active Problems:    Acute on chronic heart failure (HCC)    Pulmonary Hypertension  Plan: normal systolic and diastolic function on ECHO but with RV dilation and RVSP of 75mmHg likely due to untreated LILA, hypoxemia, and untreated COPD; was receiving lasix, now on hold due to hypotension. Will stop IVF as he is taking PO fluids and with the risk of volume overload and pulmonary edema. BP more stable this morning.     LILA (obstructive sleep apnea)  Plan: has a BIPAP at home. Needs to bring into hospital so that we can make adjustments if needed.     Polycythemia  Plan: had therapeutic phlebotomy; associated with chronic hypoxemia. Will need to assess O2 needs prior to discharge.     COPD (chronic obstructive pulmonary disease) ([facility])  Plan: no PFTs for review; highly suspected with smoking history; continue nebs while here; needs close follow up with pulmonary as o/p; no further wheezing noted; remains on solumedrol 40mg daily  Elevated bicarb on labs and compensated ABG with pH 7.42/pCO2 57.2. Needs to wear BIPAP nightly at home.     History of tobacco abuse  Plan: 
pressure overload. Normal diastolic function.    Right Ventricle: Right ventricle is dilated. Mildly reduced systolic function.    Mitral Valve: Mild regurgitation.    Tricuspid Valve: Mild regurgitation. The estimated RVSP is 75 mmHg.    Signed by: Santiago Ramos MD on 3/2/2024  8:48 PM    Assessment and Plan:  (Medical Decision Making)   Impression:   63 y.o. male admitted with acute respiratory failure with hypoxia and hypercapnia with history of untreated COPD and LILA noncompliant with CPAP. Has significant edema with proBNP of 2536. Tx with V60/lasix and airvo. Echo noted EF 50-55% with pul htn of 75.      Principal Problem:    Acute respiratory failure with hypoxia and hypercapnia (HCC)  Plan: Remains on airvo.  Sats low 90s. Desaturates with any exertion.  CXR still with some edema but lasix being held with diarrhea. Overall neg balance. Will resume lasix when more stable.   Active Problems:    Acute on chronic heart failure (HCC)  Plan: as above. + leg edema.     LILA (obstructive sleep apnea)  Plan: was not compliant with home CPAP due to broken mask. ABG today with ph 7.4/CO2 57.  Did not use bipap last night due to alarming and no humidity. Will talk to RT about working on this.  ? Need to change home machine to bipap    Polycythemia  Plan: post phlebotomy this admission. Associated with chronic hypoxia. Did not use oxygen at home     COPD (chronic obstructive pulmonary disease) (HCC)  Plan: no PFTs for review. Will need office follow up with PFTs. Using nebs here. Also on IV steroids for suspected exacerbation. No wheezing on exam - decrease dose today    History of tobacco abuse  Plan: suspect COPD. Will need office follow up     Obesity  Plan: BMI 45    Acute kidney injury (HCC)  Plan: resolved    Melena  Plan: Ongoing. GI seeing this AM.  Lovenox on hold, on BID PPI.  Serial hemoglobins ordered.      Pulmonary Hypertension  Plan: associated with untreated LILA and suspected COPD    --resume lasix when 
rhonchi, or rales.  Symmetric expansion. On 6L high flow O2. BiPAP at bedside.   Abdomen:   Protuberant. Soft, nontender, nondistended.  Extremities: No cyanosis or clubbing.  No edema  Skin:     No rashes.  Normal coloration.   Warm and dry.    Neuro:  CN II-XII grossly intact.    Psych:  Judgement and reasoning seem to be slightly altered. Normal mood and affect.      I have personally reviewed labs and tests:  Recent Labs:  Recent Results (from the past 48 hour(s))   Hemoglobin and Hematocrit    Collection Time: 03/04/24 10:15 PM   Result Value Ref Range    Hemoglobin 6.5 (LL) 13.6 - 17.2 g/dL    Hematocrit 21.7 (L) 41.1 - 50.3 %   PREPARE RBC (CROSSMATCH), 2 Units    Collection Time: 03/04/24 11:15 PM   Result Value Ref Range    History Check Historical check performed    CBC with Auto Differential    Collection Time: 03/05/24 10:09 AM   Result Value Ref Range    WBC 18.3 (H) 4.3 - 11.1 K/uL    RBC 2.66 (L) 4.23 - 5.6 M/uL    Hemoglobin 7.6 (L) 13.6 - 17.2 g/dL    Hematocrit 24.0 (L) 41.1 - 50.3 %    MCV 90.2 82 - 102 FL    MCH 28.6 26.1 - 32.9 PG    MCHC 31.7 31.4 - 35.0 g/dL    RDW 17.6 (H) 11.9 - 14.6 %    Platelets 284 150 - 450 K/uL    MPV 10.0 9.4 - 12.3 FL    nRBC 0.00 0.0 - 0.2 K/uL    Differential Type AUTOMATED      Neutrophils % 73 43 - 78 %    Lymphocytes % 14 13 - 44 %    Monocytes % 12 4.0 - 12.0 %    Eosinophils % 1 0.5 - 7.8 %    Basophils % 0 0.0 - 2.0 %    Immature Granulocytes 1 0.0 - 5.0 %    Neutrophils Absolute 13.4 (H) 1.7 - 8.2 K/UL    Lymphocytes Absolute 2.5 0.5 - 4.6 K/UL    Monocytes Absolute 2.1 (H) 0.1 - 1.3 K/UL    Eosinophils Absolute 0.2 0.0 - 0.8 K/UL    Basophils Absolute 0.0 0.0 - 0.2 K/UL    Absolute Immature Granulocyte 0.1 0.0 - 0.5 K/UL   Magnesium    Collection Time: 03/05/24 10:09 AM   Result Value Ref Range    Magnesium 1.9 1.8 - 2.4 mg/dL   Comprehensive Metabolic Panel    Collection Time: 03/05/24 10:09 AM   Result Value Ref Range    Sodium 141 136 - 146 mmol/L    
stated goals are met, whichever comes first.    THERAPY PROGNOSIS: Excellent    PROBLEM LIST:   (Skilled intervention is medically necessary to address:)  Decreased ADL/Functional Activities  Decreased Activity Tolerance  Decreased Balance  Decreased Coordination  Decreased Strength  Decreased Transfer Abilities INTERVENTIONS PLANNED:   (Benefits and precautions of physical therapy have been discussed with the patient.)  Self Care Training  Therapeutic Activity  Therapeutic Exercise/HEP  Neuromuscular Re-education  Gait Training  Manual Therapy  Modalities  Education       TREATMENT:   EVALUATION: LOW COMPLEXITY: (Untimed Charge)    TREATMENT:   Therapeutic Exercise (10 Minutes): Therapeutic exercises noted below to improve functional activity tolerance.     TREATMENT GRID:  N/A    AFTER TREATMENT PRECAUTIONS: Chair, Needs within reach, and RN notified    INTERDISCIPLINARY COLLABORATION:  RN/ PCT, PT/ PTA, and OT/ SMITH    EDUCATION: Education Given To: Patient  Education Provided: Role of Therapy;Plan of Care    TIME IN/OUT:  Time In: 1416  Time Out: 1435  Minutes: 19    Cecilia Flores PT    
suspected COPD    --resume lasix when stable - IV fluids for now   --have family bring home bipap - review settings and resume use here - adjust if needed  --have RT try high flow cannula  --office follow up with PFTs  --EGD/colonoscopy this AM      More than 50% of the time documented was spent in face-to-face contact with the patient and in the care of the patient on the floor/unit where the patient is located.    In this split/shared evaluation I performed performed a medically appropriate history and exam, counseled and educated the patient and/or family member, ordered medications, tests or procedures, documented information in EMR, and coordinated care. which accounted for 14 minutes of clinical time.     Monique Ng, APRN - CNP

## 2024-03-07 NOTE — ACP (ADVANCE CARE PLANNING)
Advance Care Planning     Advance Care Planning Inpatient Note  [facility] Department    Today's Date: 3/7/2024  Unit: SFD 8 MED SURG    Received request from IDT Member.  Upon review of chart and communication with care team, patient's decision making abilities are not in question.. Patient was/were present in the room during visit.    Goals of ACP Conversation:  Discuss advance care planning documents    Health Care Decision Makers:     No healthcare decision makers have been documented.  Click here to complete HealthCare Decision Makers including selection of the Healthcare Decision Maker Relationship (ie \"Primary\")  Summary:  Documented Next of Kin, per patient report    Advance Care Planning Documents (Patient Wishes):  None     Assessment:  Patient declined need for HCPOA.  Patient has five children who share the legal next of kin.  Patient expressed understanding of next of kin's strong and states that family is on the same page for what types of care patient may desire.      Interventions:  Encouraged ongoing ACP conversation with future decision makers and loved ones  Patient DECLINED ACP conversation    Care Preferences Communicated:   No    Outcomes/Plan:  ACP Discussion: Refused    Electronically signed by Chaplain Clarisse on 3/7/2024 at 9:22 AM

## 2024-03-12 ENCOUNTER — OFFICE VISIT (OUTPATIENT)
Age: 64
End: 2024-03-12
Payer: COMMERCIAL

## 2024-03-12 VITALS
SYSTOLIC BLOOD PRESSURE: 130 MMHG | WEIGHT: 315 LBS | DIASTOLIC BLOOD PRESSURE: 82 MMHG | HEART RATE: 72 BPM | BODY MASS INDEX: 40.43 KG/M2 | HEIGHT: 74 IN

## 2024-03-12 DIAGNOSIS — J44.9 CHRONIC OBSTRUCTIVE PULMONARY DISEASE, UNSPECIFIED COPD TYPE (HCC): Chronic | ICD-10-CM

## 2024-03-12 DIAGNOSIS — G47.33 OSA (OBSTRUCTIVE SLEEP APNEA): Chronic | ICD-10-CM

## 2024-03-12 DIAGNOSIS — I27.20 PULMONARY HYPERTENSION (HCC): Primary | Chronic | ICD-10-CM

## 2024-03-12 PROCEDURE — 3023F SPIROM DOC REV: CPT | Performed by: INTERNAL MEDICINE

## 2024-03-12 PROCEDURE — 1036F TOBACCO NON-USER: CPT | Performed by: INTERNAL MEDICINE

## 2024-03-12 PROCEDURE — G8484 FLU IMMUNIZE NO ADMIN: HCPCS | Performed by: INTERNAL MEDICINE

## 2024-03-12 PROCEDURE — 3017F COLORECTAL CA SCREEN DOC REV: CPT | Performed by: INTERNAL MEDICINE

## 2024-03-12 PROCEDURE — G8417 CALC BMI ABV UP PARAM F/U: HCPCS | Performed by: INTERNAL MEDICINE

## 2024-03-12 PROCEDURE — 99204 OFFICE O/P NEW MOD 45 MIN: CPT | Performed by: INTERNAL MEDICINE

## 2024-03-12 PROCEDURE — 1111F DSCHRG MED/CURRENT MED MERGE: CPT | Performed by: INTERNAL MEDICINE

## 2024-03-12 PROCEDURE — G8427 DOCREV CUR MEDS BY ELIG CLIN: HCPCS | Performed by: INTERNAL MEDICINE

## 2024-03-12 RX ORDER — FUROSEMIDE 40 MG/1
40 TABLET ORAL DAILY PRN
Qty: 30 TABLET | Refills: 11 | Status: SHIPPED | OUTPATIENT
Start: 2024-03-12

## 2024-03-12 NOTE — PROGRESS NOTES
tablet by mouth daily as needed (weight gain / swelling), Disp-30 tablet, R-11Normal  Chronic obstructive pulmonary disease, unspecified COPD type (HCC)  -     [facility]  LILA (obstructive sleep apnea)  -     [facility]  - ECG shows incomplete RBBB. TTE shows RV pressure overload with RVSP 75 mmHg and mild RV dysfunction. Suspect group 3 pulmonary hypertension.  - Refer to pulmonary to establish care for management of his COPD and LILA.  - Will initiate Lasix 40mg PO qday PRN for any component of volume overload due to his RV failure. Instructed on tracking weights.  - Mildly elevated troponin while in the hospital. Patient believes he may have had a stress test in the past two years. Request records from prior cardiologist.    Return in about 3 months (around 6/12/2024) for Routine follow up.       Thank you for allowing me to participate in this patient's care.  Please call or contact me if there are any questions or concerns regarding the above.      Jeremy Broderick,   03/12/24  1:34 PM      Proofread, but unrecognized errors may exist.

## 2024-03-20 ENCOUNTER — TELEPHONE (OUTPATIENT)
Dept: PULMONOLOGY | Age: 64
End: 2024-03-20

## 2024-03-20 NOTE — TELEPHONE ENCOUNTER
Patient was in hospital recently and is asking if he is suppose to be on 02 all the time or just when his 02 drops.  Also has a $5000 deducible and the inhalers  Are  $250/$200 and he is having a hard time affording these

## 2024-03-21 NOTE — TELEPHONE ENCOUNTER
Last seen:   Hx: LILA, COPD, obese, non compliance    Hospitalized 3/1 - 3/7 for bronchitis, DONYA, acute on chronic dCHF, acute resp fail  Notes indicate non compliance due to financial limitations.  D/c summary indicates need for f/u appt @ [facility] in 2 weeks (approx 3/14) & need for f/u CBC.   Saw cardiology 3/12.     Patient call to clarify O2 use orders and report inhalers are too expensive.   Care management note indicates O2 orders sent to [facility]. Last respiratory note indicates 3L O2 @ rest and 5L w/ ambulation.   Last pulmonary note 3/6: \"He is noncompliant with noninvasive positive pressure ventilation, he needs to wear BiPAP with sleep and at night. Can follow-up with us in the office if he still wishes in the next 4 to 6 weeks nothing much to add, will see as needed.\"    Contacted patient regarding questions.   He has been on websites to get savings cards for prescribed inhalers. Wants to know if he will have to wear O2 forever.  Advised to have hospital f/u appt and help determine if he should f/u up here routinely, reassess O2 needs and review inhaler use.   Agreeable and set up for 3/29 w/ Dr. Boyer.

## 2024-03-29 ENCOUNTER — OFFICE VISIT (OUTPATIENT)
Dept: PULMONOLOGY | Age: 64
End: 2024-03-29
Payer: COMMERCIAL

## 2024-03-29 VITALS
SYSTOLIC BLOOD PRESSURE: 120 MMHG | RESPIRATION RATE: 20 BRPM | HEART RATE: 110 BPM | WEIGHT: 315 LBS | DIASTOLIC BLOOD PRESSURE: 80 MMHG | HEIGHT: 74 IN | TEMPERATURE: 98 F | BODY MASS INDEX: 40.43 KG/M2 | OXYGEN SATURATION: 97 %

## 2024-03-29 DIAGNOSIS — J44.9 CHRONIC OBSTRUCTIVE PULMONARY DISEASE, UNSPECIFIED COPD TYPE (HCC): Primary | ICD-10-CM

## 2024-03-29 DIAGNOSIS — G47.33 OSA (OBSTRUCTIVE SLEEP APNEA): Chronic | ICD-10-CM

## 2024-03-29 DIAGNOSIS — D75.1 POLYCYTHEMIA: Chronic | ICD-10-CM

## 2024-03-29 DIAGNOSIS — J96.12 CHRONIC RESPIRATORY FAILURE WITH HYPERCAPNIA (HCC): ICD-10-CM

## 2024-03-29 DIAGNOSIS — Z87.891 HISTORY OF PRIOR CIGARETTE SMOKING: ICD-10-CM

## 2024-03-29 PROCEDURE — 99214 OFFICE O/P EST MOD 30 MIN: CPT | Performed by: INTERNAL MEDICINE

## 2024-03-29 PROCEDURE — 3017F COLORECTAL CA SCREEN DOC REV: CPT | Performed by: INTERNAL MEDICINE

## 2024-03-29 PROCEDURE — G8417 CALC BMI ABV UP PARAM F/U: HCPCS | Performed by: INTERNAL MEDICINE

## 2024-03-29 PROCEDURE — 1036F TOBACCO NON-USER: CPT | Performed by: INTERNAL MEDICINE

## 2024-03-29 PROCEDURE — 1111F DSCHRG MED/CURRENT MED MERGE: CPT | Performed by: INTERNAL MEDICINE

## 2024-03-29 PROCEDURE — G8427 DOCREV CUR MEDS BY ELIG CLIN: HCPCS | Performed by: INTERNAL MEDICINE

## 2024-03-29 PROCEDURE — 3023F SPIROM DOC REV: CPT | Performed by: INTERNAL MEDICINE

## 2024-03-29 PROCEDURE — G8484 FLU IMMUNIZE NO ADMIN: HCPCS | Performed by: INTERNAL MEDICINE

## 2024-03-29 RX ORDER — FLUTICASONE PROPIONATE AND SALMETEROL 232; 14 UG/1; UG/1
1 POWDER, METERED RESPIRATORY (INHALATION) 2 TIMES DAILY
Qty: 1 EACH | Refills: 11 | Status: SHIPPED | OUTPATIENT
Start: 2024-03-29

## 2024-03-29 NOTE — PROGRESS NOTES
Name:  Tay Conklin  YOB: 1960   MRN: [id number]      Office Visit: 3/29/2024        ASSESSMENT AND PLAN:  (Medical Decision Making)    Impression: 63 y.o. male with presumed COPD, chronic respiratory failure, polycythemia from chronic hypoxia, LILA on BiPAP    1. Chronic obstructive pulmonary disease, unspecified COPD type (HCC)  Will try to change to air duo with good Rx for cost and this plus Spiriva should be able to get his current inhaler cost from 450 down to about 100 or less.  Will get complete PFTs scheduled for further evaluation.  He quit smoking in 2014.  He still working, but we could consider pulmonary rehab at some time in the future.  - Fluticasone-Salmeterol 232-14 MCG/ACT AEPB; Inhale 1 puff into the lungs in the morning and at bedtime  Dispense: 1 each; Refill: 11    2. Chronic respiratory failure with hypercapnia (HCC)  Ambulate today and still needs 3 L O2 at rest and 5 L with exertion.   - DME - DURABLE MEDICAL EQUIPMENT    3. LILA (obstructive sleep apnea)  Recommend sleep referral so he can have ongoing monitoring of his severe sleep apnea.  - Ambulatory referral to Sleep Medicine    4. Polycythemia  Would benefit from repeat CBC in the next few months after using his oxygen and BiPAP consistently.    5. History of prior cigarette smoking  Quit smoking in 2014.  Which is chronic hypoxemia he probably does not qualify for lung cancer screening, but we could consider this in the future.  He did have elevated RVSP on echo and it may be worth repeating his echocardiogram after his polycythemia has resolved and has been using oxygen and BiPAP continuously to see if his evidence for pulmonary hypertension improves.    Orders Placed This Encounter   Medications    Fluticasone-Salmeterol 232-14 MCG/ACT AEPB     Sig: Inhale 1 puff into the lungs in the morning and at bedtime     Dispense:  1 each     Refill:  11         Procedures    DME - DURABLE MEDICAL EQUIPMENT     Please evaluate

## 2024-03-29 NOTE — PATIENT INSTRUCTIONS
We understand that insurance companies have different 'preferred' medications. These preferences can change yearly and can be difficult to track. Depending upon your insurance and their preferred medicines, some medications we prescribe may be too expensive. If this happens, we recommend that you find out what inhalers for COPD or asthma are \"preferred\" on your insurance drug formulary by calling the member services phone number on the back of the insurance card.  The cost of your medication can be dramatically different depending on this.      Once the preferred inhalers are known, please send us a message in [employer] or call us back at [phone] with this information.  We will then choose the best option available for you and send that prescription to your pharmacy on file.    LAMA Inhalers:  Spiriva  Incruse  Seebri  Tudorza    ICS/LABA Inhalers:  Fluticasone/Salmeterol inhaler (Advair, Airduo, Wixela)  Symbicort  Dulera  Breo    LABA/LAMA Inhalers:  Stiolto  Anoro  Bevespi  Duaklir    ICS/LABA/LAMA Inhalers:  Trelegy  Breztri    Short Acting Inhaler:  Albuterol (ProAir HFA/Respiclick/Digihaler, Ventolin, Proventil)  Levalbuterol (Xopenex)  Atrovent  Combivent

## 2024-04-09 ENCOUNTER — NURSE ONLY (OUTPATIENT)
Age: 64
End: 2024-04-09
Payer: COMMERCIAL

## 2024-04-09 DIAGNOSIS — J44.9 CHRONIC OBSTRUCTIVE PULMONARY DISEASE, UNSPECIFIED COPD TYPE (HCC): Primary | ICD-10-CM

## 2024-04-09 LAB
FEV 1 , POC: 1.81 L
FEV1 % PRED, POC: 48 %
FEV1/FVC, POC: NORMAL
FVC % PRED, POC: 70 %
FVC, POC: NORMAL

## 2024-04-09 PROCEDURE — 94729 DIFFUSING CAPACITY: CPT | Performed by: INTERNAL MEDICINE

## 2024-04-09 PROCEDURE — 94726 PLETHYSMOGRAPHY LUNG VOLUMES: CPT | Performed by: INTERNAL MEDICINE

## 2024-04-09 PROCEDURE — 94060 EVALUATION OF WHEEZING: CPT | Performed by: INTERNAL MEDICINE

## 2024-04-09 ASSESSMENT — PULMONARY FUNCTION TESTS
FEV1_PERCENT_PREDICTED_POC: 48
FVC_PERCENT_PREDICTED_POC: 70

## 2024-04-11 ENCOUNTER — TELEPHONE (OUTPATIENT)
Dept: PULMONOLOGY | Age: 64
End: 2024-04-11

## 2024-04-11 DIAGNOSIS — J44.9 CHRONIC OBSTRUCTIVE PULMONARY DISEASE, UNSPECIFIED COPD TYPE (HCC): Primary | ICD-10-CM

## 2024-04-11 NOTE — TELEPHONE ENCOUNTER
Patient called the office stating that he would like a POC but that [facility] cannot offer him one.  Patient is requesting a order to be sent to him via [employer] so he could shop around and buy a POC.   I have sent the order along with  the contact information to [facility] in case he would like to ask some questions if he decides to go through with them..  No further questions or concerns were asked at this time. // Candy Ferris [facility]

## 2024-05-17 ENCOUNTER — TELEPHONE (OUTPATIENT)
Age: 64
End: 2024-05-17

## 2024-05-17 ENCOUNTER — APPOINTMENT (OUTPATIENT)
Dept: GENERAL RADIOLOGY | Age: 64
End: 2024-05-17
Payer: COMMERCIAL

## 2024-05-17 ENCOUNTER — HOSPITAL ENCOUNTER (EMERGENCY)
Age: 64
Discharge: HOME | End: 2024-05-17
Attending: GENERAL PRACTICE
Payer: COMMERCIAL

## 2024-05-17 VITALS
DIASTOLIC BLOOD PRESSURE: 95 MMHG | RESPIRATION RATE: 18 BRPM | OXYGEN SATURATION: 95 % | BODY MASS INDEX: 40.43 KG/M2 | TEMPERATURE: 98.8 F | HEIGHT: 74 IN | HEART RATE: 112 BPM | WEIGHT: 315 LBS | SYSTOLIC BLOOD PRESSURE: 138 MMHG

## 2024-05-17 DIAGNOSIS — M79.89 LEG SWELLING: ICD-10-CM

## 2024-05-17 DIAGNOSIS — I50.9 CONGESTIVE HEART FAILURE, UNSPECIFIED HF CHRONICITY, UNSPECIFIED HEART FAILURE TYPE (HCC): Primary | ICD-10-CM

## 2024-05-17 DIAGNOSIS — R00.0 TACHYCARDIA: ICD-10-CM

## 2024-05-17 LAB
ALBUMIN SERPL-MCNC: 4 G/DL (ref 3.2–4.6)
ALBUMIN/GLOB SERPL: 1.7 (ref 0.4–1.6)
ALP SERPL-CCNC: 71 U/L (ref 45–117)
ALT SERPL-CCNC: 21 U/L (ref 13–61)
ANION GAP SERPL CALC-SCNC: 14 MMOL/L (ref 2–11)
AST SERPL-CCNC: 24 U/L (ref 15–37)
BASOPHILS # BLD: 0.1 K/UL (ref 0–0.2)
BASOPHILS NFR BLD: 1 % (ref 0–2)
BILIRUB SERPL-MCNC: 0.9 MG/DL (ref 0.2–1.1)
BUN SERPL-MCNC: 28 MG/DL (ref 8–23)
CALCIUM SERPL-MCNC: 9.2 MG/DL (ref 8.3–10.4)
CHLORIDE SERPL-SCNC: 97 MMOL/L (ref 98–107)
CO2 SERPL-SCNC: 30 MMOL/L (ref 21–32)
CREAT SERPL-MCNC: 1.5 MG/DL (ref 0.8–1.5)
DIFFERENTIAL METHOD BLD: ABNORMAL
EKG ATRIAL RATE: 116 BPM
EKG DIAGNOSIS: NORMAL
EKG Q-T INTERVAL: 371 MS
EKG QRS DURATION: 124 MS
EKG QTC CALCULATION (BAZETT): 518 MS
EKG R AXIS: 45 DEGREES
EKG T AXIS: 57 DEGREES
EKG VENTRICULAR RATE: 117 BPM
EOSINOPHIL # BLD: 0.4 K/UL (ref 0–0.8)
EOSINOPHIL NFR BLD: 4 % (ref 0.5–7.8)
ERYTHROCYTE [DISTWIDTH] IN BLOOD BY AUTOMATED COUNT: 22.5 % (ref 11.9–14.6)
GLOBULIN SER CALC-MCNC: 2.3 G/DL (ref 2.8–4.5)
GLUCOSE SERPL-MCNC: 104 MG/DL (ref 65–100)
HCT VFR BLD AUTO: 27.9 % (ref 41.1–50.3)
HGB BLD-MCNC: 7.9 G/DL (ref 13.6–17.2)
IMM GRANULOCYTES # BLD AUTO: 0 K/UL (ref 0–0.5)
IMM GRANULOCYTES NFR BLD AUTO: 0 % (ref 0–5)
LYMPHOCYTES # BLD: 2.1 K/UL (ref 0.5–4.6)
LYMPHOCYTES NFR BLD: 21 % (ref 13–44)
MCH RBC QN AUTO: 20.5 PG (ref 26.1–32.9)
MCHC RBC AUTO-ENTMCNC: 28.3 G/DL (ref 31.4–35)
MCV RBC AUTO: 72.3 FL (ref 82–102)
MONOCYTES # BLD: 0.9 K/UL (ref 0.1–1.3)
MONOCYTES NFR BLD: 9 % (ref 4–12)
NEUTS SEG # BLD: 6.3 K/UL (ref 1.7–8.2)
NEUTS SEG NFR BLD: 64 % (ref 43–78)
NRBC # BLD: 0.02 K/UL (ref 0–0.2)
NT PRO BNP: 2015 PG/ML (ref 0–450)
PLATELET # BLD AUTO: 647 K/UL (ref 150–450)
PMV BLD AUTO: 9 FL (ref 9.4–12.3)
POTASSIUM SERPL-SCNC: 3.5 MMOL/L (ref 3.5–5.1)
PROT SERPL-MCNC: 6.3 G/DL (ref 6.4–8.2)
RBC # BLD AUTO: 3.86 M/UL (ref 4.23–5.6)
SODIUM SERPL-SCNC: 141 MMOL/L (ref 133–143)
WBC # BLD AUTO: 9.7 K/UL (ref 4.3–11.1)

## 2024-05-17 PROCEDURE — 96374 THER/PROPH/DIAG INJ IV PUSH: CPT

## 2024-05-17 PROCEDURE — 93005 ELECTROCARDIOGRAM TRACING: CPT | Performed by: GENERAL PRACTICE

## 2024-05-17 PROCEDURE — 6360000002 HC RX W HCPCS: Performed by: GENERAL PRACTICE

## 2024-05-17 PROCEDURE — 71045 X-RAY EXAM CHEST 1 VIEW: CPT

## 2024-05-17 PROCEDURE — 85025 COMPLETE CBC W/AUTO DIFF WBC: CPT

## 2024-05-17 PROCEDURE — 99285 EMERGENCY DEPT VISIT HI MDM: CPT

## 2024-05-17 PROCEDURE — 93010 ELECTROCARDIOGRAM REPORT: CPT | Performed by: INTERNAL MEDICINE

## 2024-05-17 PROCEDURE — 80053 COMPREHEN METABOLIC PANEL: CPT

## 2024-05-17 PROCEDURE — 83880 ASSAY OF NATRIURETIC PEPTIDE: CPT

## 2024-05-17 RX ORDER — POTASSIUM CHLORIDE 20 MEQ/1
20 TABLET, EXTENDED RELEASE ORAL DAILY
Qty: 5 TABLET | Refills: 0 | Status: SHIPPED | OUTPATIENT
Start: 2024-05-17 | End: 2024-05-18

## 2024-05-17 RX ORDER — FUROSEMIDE 10 MG/ML
40 INJECTION INTRAMUSCULAR; INTRAVENOUS ONCE
Status: COMPLETED | OUTPATIENT
Start: 2024-05-17 | End: 2024-05-17

## 2024-05-17 RX ADMIN — FUROSEMIDE 40 MG: 10 INJECTION, SOLUTION INTRAMUSCULAR; INTRAVENOUS at 19:44

## 2024-05-17 ASSESSMENT — PAIN - FUNCTIONAL ASSESSMENT
PAIN_FUNCTIONAL_ASSESSMENT: NONE - DENIES PAIN
PAIN_FUNCTIONAL_ASSESSMENT: NONE - DENIES PAIN

## 2024-05-17 NOTE — TELEPHONE ENCOUNTER
Having swelling in groin area and fluid pills Dr Broderick gave him is not going away Very uncomffortable Please call

## 2024-05-17 NOTE — TELEPHONE ENCOUNTER
Pt states he was prescribed diuretics when he was in the hospital. Admits to LILA with a sleep study in September, but having trouble with his old machine. States he woke up 3 days ago and his groin area was quite swollen. Admits to some edema in his feet and legs. States started taking the furosemide BID 2 days ago and hasn't noticed any difference. Denies SOB. Instructed pt on daily weight protocols. Instructed pt not to take more furosemide than prescribed unless told to by this office. Pt asks if he needs to go to ER as edema is so significant he can barely get his pants on and is struggling to go to the bathroom. Again denies any SOB or much leg edema. Appt scheduled Monday, 5/20 at 10:00 with Dr. Broderick. Please advise recommendations.

## 2024-05-17 NOTE — ED TRIAGE NOTES
Pt ambulatory to triage for reports of biliateral leg & penile swelling. Pt states progressive swelling since last Saturday. Pt states he has hx of COPD & normally wears 4L O2 at home. Pt arrives to triage 72% on RA with improvement to 90% on baseline 4L. Pt denies any pain. Pt denies acute shortness of breath from baseline. Pt reports increased difficulty urinating due to swelling.

## 2024-05-17 NOTE — TELEPHONE ENCOUNTER
Nickie Wyman MD Mason, Jennifer E RN  Caller: Unspecified (Today,  9:40 AM)  If its that bad then yes, he should go to the ER, not much we can do about that over the phone    Pt made aware of Dr. Lucas's response. Verb understanding.

## 2024-05-18 RX ORDER — POTASSIUM CHLORIDE 20 MEQ/1
20 TABLET, EXTENDED RELEASE ORAL DAILY
Qty: 5 TABLET | Refills: 0 | Status: SHIPPED | OUTPATIENT
Start: 2024-05-18 | End: 2024-05-23

## 2024-05-18 NOTE — ED PROVIDER NOTES
Patient's daughter contacted ER and requesting the prescription be sent to [facility] pharmacy on [address] as she is unable to take his paper prescriptions to be filled at local pharmacy.  E-prescriptions for Eliquis and Potassium chloride sent to [facility].     Valdo Garcia Jr., MD  05/18/24 0897    
Narrative    EXAM: CHEST X-RAY    HISTORY:   Chest pain.     COMPARISON:   3 March 2024 chest x-ray     TECHNIQUE:   1  view chest is submitted for review.    FINDINGS:    Lines and tubes:  None.   The lungs are hyperexpanded. Airspace opacities lower lobes.  No effusion.    The cardiac silhouette is enlarged..   Pulmonary vascularity is prominent osseous structures are within expected limits  for patients age. .      Impression    1. Cardiomegaly.  2. Hyperexpanded lungs airspace opacities in the lung bases. Please correlate  for pneumonia versus atelectasis   CBC with Auto Differential   Result Value Ref Range    WBC 9.7 4.3 - 11.1 K/uL    RBC 3.86 (L) 4.23 - 5.60 M/uL    Hemoglobin 7.9 (L) 13.6 - 17.2 g/dL    Hematocrit 27.9 (L) 41.1 - 50.3 %    MCV 72.3 (L) 82.0 - 102.0 FL    MCH 20.5 (L) 26.1 - 32.9 PG    MCHC 28.3 (L) 31.4 - 35.0 g/dL    RDW 22.5 (H) 11.9 - 14.6 %    Platelets 647 (H) 150 - 450 K/uL    MPV 9.0 (L) 9.4 - 12.3 FL    nRBC 0.02 0.0 - 0.2 K/uL    Differential Type AUTOMATED      Neutrophils % 64 43 - 78 %    Lymphocytes % 21 13 - 44 %    Monocytes % 9 4.0 - 12.0 %    Eosinophils % 4 0.5 - 7.8 %    Basophils % 1 0.0 - 2.0 %    Immature Granulocytes % 0 0.0 - 5.0 %    Neutrophils Absolute 6.3 1.7 - 8.2 K/UL    Lymphocytes Absolute 2.1 0.5 - 4.6 K/UL    Monocytes Absolute 0.9 0.1 - 1.3 K/UL    Eosinophils Absolute 0.4 0.0 - 0.8 K/UL    Basophils Absolute 0.1 0.0 - 0.2 K/UL    Immature Granulocytes Absolute 0.0 0.0 - 0.5 K/UL   Comprehensive Metabolic Panel   Result Value Ref Range    Sodium 141 133 - 143 mmol/L    Potassium 3.5 3.5 - 5.1 mmol/L    Chloride 97 (L) 98 - 107 mmol/L    CO2 30 21 - 32 mmol/L    Anion Gap 14 (H) 2 - 11 mmol/L    Glucose 104 (H) 65 - 100 mg/dL    BUN 28 (H) 8 - 23 MG/DL    Creatinine 1.50 0.8 - 1.5 MG/DL    Est, Glom Filt Rate 52 (L) >60 ml/min/1.73m2    Calcium 9.2 8.3 - 10.4 MG/DL    Total Bilirubin 0.9 0.2 - 1.1 MG/DL    ALT 21 13.0 - 61.0 U/L    AST 24 15 - 37 U/L    Alk

## 2024-05-20 ENCOUNTER — OFFICE VISIT (OUTPATIENT)
Age: 64
End: 2024-05-20
Payer: COMMERCIAL

## 2024-05-20 VITALS
DIASTOLIC BLOOD PRESSURE: 90 MMHG | BODY MASS INDEX: 40.43 KG/M2 | WEIGHT: 315 LBS | SYSTOLIC BLOOD PRESSURE: 124 MMHG | OXYGEN SATURATION: 81 % | HEIGHT: 74 IN | HEART RATE: 119 BPM

## 2024-05-20 DIAGNOSIS — I27.20 PULMONARY HYPERTENSION (HCC): Chronic | ICD-10-CM

## 2024-05-20 DIAGNOSIS — I48.3 TYPICAL ATRIAL FLUTTER (HCC): Chronic | ICD-10-CM

## 2024-05-20 DIAGNOSIS — I50.32 CHRONIC HEART FAILURE WITH PRESERVED EJECTION FRACTION (HFPEF) (HCC): Primary | Chronic | ICD-10-CM

## 2024-05-20 PROCEDURE — 1036F TOBACCO NON-USER: CPT | Performed by: INTERNAL MEDICINE

## 2024-05-20 PROCEDURE — G8417 CALC BMI ABV UP PARAM F/U: HCPCS | Performed by: INTERNAL MEDICINE

## 2024-05-20 PROCEDURE — 3017F COLORECTAL CA SCREEN DOC REV: CPT | Performed by: INTERNAL MEDICINE

## 2024-05-20 PROCEDURE — G8427 DOCREV CUR MEDS BY ELIG CLIN: HCPCS | Performed by: INTERNAL MEDICINE

## 2024-05-20 PROCEDURE — 99214 OFFICE O/P EST MOD 30 MIN: CPT | Performed by: INTERNAL MEDICINE

## 2024-05-20 RX ORDER — METOPROLOL SUCCINATE 25 MG/1
25 TABLET, EXTENDED RELEASE ORAL DAILY
Qty: 30 TABLET | Refills: 11 | Status: ON HOLD | OUTPATIENT
Start: 2024-05-20 | End: 2024-05-26

## 2024-05-20 RX ORDER — FUROSEMIDE 40 MG/1
40 TABLET ORAL DAILY
Qty: 60 TABLET | Refills: 11 | Status: ON HOLD | OUTPATIENT
Start: 2024-05-20 | End: 2024-05-26 | Stop reason: HOSPADM

## 2024-05-20 NOTE — PROGRESS NOTES
[facility] CARDIOLOGY  [address] [place] [address], [address]  [place] [address]  PHONE: [phone]      24    NAME:  Tay Conklin  : 1960  MRN: [id number]         SUBJECTIVE:   Tay Conklin is a 64 y.o. male seen for a follow up visit regarding the following:     Chief Complaint   Patient presents with    Other     Groin Edema    Shortness of Breath            HPI:  Follow up  Other (Groin Edema) and Shortness of Breath   .      64 y.o. male with PMH COPD, LILA, chronic HFpEF presenting for evaluation of worsening leg swelling. Patient was seen in the ED on  at which time he was found with atrial flutter and ADHF. He was recommended for admission but refused. He reports weight gain as well. Has chronic dyspnea and is on supplemental O2. No chest pain. No other acute complaints.        Cardiac Medications       Beta Blockers Cardio-Selective       metoprolol succinate (TOPROL XL) 25 MG extended release tablet Take 1 tablet by mouth daily       Loop Diuretics       furosemide (LASIX) 40 MG tablet Take 1 tablet by mouth daily       Salicylates       aspirin 81 MG EC tablet Take 1 tablet by mouth daily       Direct Factor Xa Inhibitors       apixaban (ELIQUIS) 5 MG TABS tablet Take 1 tablet by mouth 2 times daily                  Past Medical History, Past Surgical History, Family history, Social History, and Medications were all reviewed with the patient today and updated as necessary.     Prior to Admission medications    Medication Sig Start Date End Date Taking? Authorizing Provider   furosemide (LASIX) 40 MG tablet Take 1 tablet by mouth daily 24  Yes Jeremy Broderick DO   apixaban (ELIQUIS) 5 MG TABS tablet Take 1 tablet by mouth 2 times daily 24  Yes Jeremy Broderick, DO   metoprolol succinate (TOPROL XL) 25 MG extended release tablet Take 1 tablet by mouth daily 24  Yes Jeremy Broderick, DO   potassium chloride (KLOR-CON M) 20 MEQ extended release

## 2024-05-23 ENCOUNTER — HOSPITAL ENCOUNTER (INPATIENT)
Age: 64
LOS: 3 days | Discharge: HOME | DRG: 292 | End: 2024-05-26
Attending: STUDENT IN AN ORGANIZED HEALTH CARE EDUCATION/TRAINING PROGRAM | Admitting: INTERNAL MEDICINE
Payer: COMMERCIAL

## 2024-05-23 ENCOUNTER — APPOINTMENT (OUTPATIENT)
Dept: GENERAL RADIOLOGY | Age: 64
DRG: 292 | End: 2024-05-23
Payer: COMMERCIAL

## 2024-05-23 ENCOUNTER — TELEPHONE (OUTPATIENT)
Age: 64
End: 2024-05-23

## 2024-05-23 DIAGNOSIS — I48.92 ATRIAL FLUTTER (HCC): ICD-10-CM

## 2024-05-23 DIAGNOSIS — E83.42 HYPOMAGNESEMIA: ICD-10-CM

## 2024-05-23 DIAGNOSIS — I50.32 CHRONIC HEART FAILURE WITH PRESERVED EJECTION FRACTION (HFPEF) (HCC): Chronic | ICD-10-CM

## 2024-05-23 DIAGNOSIS — I50.9 ACUTE ON CHRONIC CONGESTIVE HEART FAILURE, UNSPECIFIED HEART FAILURE TYPE (HCC): Primary | ICD-10-CM

## 2024-05-23 PROBLEM — I26.09 ACUTE COR PULMONALE (HCC): Status: ACTIVE | Noted: 2024-05-23

## 2024-05-23 PROBLEM — I27.20 PULMONARY HTN (HCC): Status: ACTIVE | Noted: 2024-05-23

## 2024-05-23 PROBLEM — D64.9 ANEMIA: Status: ACTIVE | Noted: 2024-05-23

## 2024-05-23 LAB
ALBUMIN SERPL-MCNC: 3.4 G/DL (ref 3.2–4.6)
ALBUMIN/GLOB SERPL: 1.1 (ref 1–1.9)
ALP SERPL-CCNC: 63 U/L (ref 40–129)
ALT SERPL-CCNC: 21 U/L (ref 12–65)
ANION GAP SERPL CALC-SCNC: 9 MMOL/L (ref 9–18)
AST SERPL-CCNC: 28 U/L (ref 15–37)
BASOPHILS # BLD: 0.1 K/UL (ref 0–0.2)
BASOPHILS NFR BLD: 1 % (ref 0–2)
BILIRUB SERPL-MCNC: 0.8 MG/DL (ref 0–1.2)
BUN SERPL-MCNC: 30 MG/DL (ref 8–23)
CALCIUM SERPL-MCNC: 8.9 MG/DL (ref 8.8–10.2)
CHLORIDE SERPL-SCNC: 96 MMOL/L (ref 98–107)
CO2 SERPL-SCNC: 32 MMOL/L (ref 20–28)
CREAT SERPL-MCNC: 1.43 MG/DL (ref 0.8–1.3)
DIFFERENTIAL METHOD BLD: ABNORMAL
EKG ATRIAL RATE: 117 BPM
EKG DIAGNOSIS: NORMAL
EKG P-R INTERVAL: 152 MS
EKG Q-T INTERVAL: 326 MS
EKG QRS DURATION: 106 MS
EKG QTC CALCULATION (BAZETT): 454 MS
EKG R AXIS: 87 DEGREES
EKG T AXIS: 95 DEGREES
EKG VENTRICULAR RATE: 117 BPM
EOSINOPHIL # BLD: 0.3 K/UL (ref 0–0.8)
EOSINOPHIL NFR BLD: 2 % (ref 0.5–7.8)
ERYTHROCYTE [DISTWIDTH] IN BLOOD BY AUTOMATED COUNT: 22.5 % (ref 11.9–14.6)
GLOBULIN SER CALC-MCNC: 3 G/DL (ref 2.3–3.5)
GLUCOSE SERPL-MCNC: 140 MG/DL (ref 70–99)
HCT VFR BLD AUTO: 29.9 % (ref 41.1–50.3)
HGB BLD-MCNC: 8.2 G/DL (ref 13.6–17.2)
IMM GRANULOCYTES # BLD AUTO: 0.1 K/UL (ref 0–0.5)
IMM GRANULOCYTES NFR BLD AUTO: 0 % (ref 0–5)
LYMPHOCYTES # BLD: 1.9 K/UL (ref 0.5–4.6)
LYMPHOCYTES NFR BLD: 16 % (ref 13–44)
MAGNESIUM SERPL-MCNC: 1.7 MG/DL (ref 1.8–2.4)
MCH RBC QN AUTO: 19.8 PG (ref 26.1–32.9)
MCHC RBC AUTO-ENTMCNC: 27.4 G/DL (ref 31.4–35)
MCV RBC AUTO: 72 FL (ref 82–102)
MONOCYTES # BLD: 1 K/UL (ref 0.1–1.3)
MONOCYTES NFR BLD: 9 % (ref 4–12)
NEUTS SEG # BLD: 8.1 K/UL (ref 1.7–8.2)
NEUTS SEG NFR BLD: 71 % (ref 43–78)
NRBC # BLD: 0 K/UL (ref 0–0.2)
NT PRO BNP: 1291 PG/ML (ref 0–125)
PLATELET # BLD AUTO: 679 K/UL (ref 150–450)
PMV BLD AUTO: 8.9 FL (ref 9.4–12.3)
POTASSIUM SERPL-SCNC: 3.7 MMOL/L (ref 3.5–5.1)
PROT SERPL-MCNC: 6.4 G/DL (ref 6.3–8.2)
RBC # BLD AUTO: 4.15 M/UL (ref 4.23–5.6)
SODIUM SERPL-SCNC: 138 MMOL/L (ref 136–145)
TROPONIN T SERPL HS-MCNC: 29 NG/L (ref 0–22)
TROPONIN T SERPL HS-MCNC: 29 NG/L (ref 0–22)
TSH W FREE THYROID IF ABNORMAL: 2.56 UIU/ML (ref 0.27–4.2)
WBC # BLD AUTO: 11.4 K/UL (ref 4.3–11.1)

## 2024-05-23 PROCEDURE — 80053 COMPREHEN METABOLIC PANEL: CPT

## 2024-05-23 PROCEDURE — 2700000000 HC OXYGEN THERAPY PER DAY

## 2024-05-23 PROCEDURE — 6370000000 HC RX 637 (ALT 250 FOR IP): Performed by: NURSE PRACTITIONER

## 2024-05-23 PROCEDURE — 2140000000 HC CCU INTERMEDIATE R&B

## 2024-05-23 PROCEDURE — 6370000000 HC RX 637 (ALT 250 FOR IP): Performed by: STUDENT IN AN ORGANIZED HEALTH CARE EDUCATION/TRAINING PROGRAM

## 2024-05-23 PROCEDURE — 2500000003 HC RX 250 WO HCPCS: Performed by: NURSE PRACTITIONER

## 2024-05-23 PROCEDURE — 94761 N-INVAS EAR/PLS OXIMETRY MLT: CPT

## 2024-05-23 PROCEDURE — 85025 COMPLETE CBC W/AUTO DIFF WBC: CPT

## 2024-05-23 PROCEDURE — 2580000003 HC RX 258: Performed by: NURSE PRACTITIONER

## 2024-05-23 PROCEDURE — 93005 ELECTROCARDIOGRAM TRACING: CPT | Performed by: STUDENT IN AN ORGANIZED HEALTH CARE EDUCATION/TRAINING PROGRAM

## 2024-05-23 PROCEDURE — 83880 ASSAY OF NATRIURETIC PEPTIDE: CPT

## 2024-05-23 PROCEDURE — 93010 ELECTROCARDIOGRAM REPORT: CPT | Performed by: INTERNAL MEDICINE

## 2024-05-23 PROCEDURE — 96375 TX/PRO/DX INJ NEW DRUG ADDON: CPT

## 2024-05-23 PROCEDURE — 84443 ASSAY THYROID STIM HORMONE: CPT

## 2024-05-23 PROCEDURE — 96365 THER/PROPH/DIAG IV INF INIT: CPT

## 2024-05-23 PROCEDURE — 6360000002 HC RX W HCPCS: Performed by: STUDENT IN AN ORGANIZED HEALTH CARE EDUCATION/TRAINING PROGRAM

## 2024-05-23 PROCEDURE — 99285 EMERGENCY DEPT VISIT HI MDM: CPT

## 2024-05-23 PROCEDURE — 6360000002 HC RX W HCPCS: Performed by: NURSE PRACTITIONER

## 2024-05-23 PROCEDURE — 94640 AIRWAY INHALATION TREATMENT: CPT

## 2024-05-23 PROCEDURE — 71045 X-RAY EXAM CHEST 1 VIEW: CPT

## 2024-05-23 PROCEDURE — 84484 ASSAY OF TROPONIN QUANT: CPT

## 2024-05-23 PROCEDURE — 99223 1ST HOSP IP/OBS HIGH 75: CPT | Performed by: INTERNAL MEDICINE

## 2024-05-23 PROCEDURE — 83735 ASSAY OF MAGNESIUM: CPT

## 2024-05-23 RX ORDER — ONDANSETRON 4 MG/1
4 TABLET, ORALLY DISINTEGRATING ORAL EVERY 8 HOURS PRN
Status: DISCONTINUED | OUTPATIENT
Start: 2024-05-23 | End: 2024-05-26 | Stop reason: HOSPADM

## 2024-05-23 RX ORDER — SODIUM CHLORIDE 0.9 % (FLUSH) 0.9 %
5-40 SYRINGE (ML) INJECTION EVERY 12 HOURS SCHEDULED
Status: DISCONTINUED | OUTPATIENT
Start: 2024-05-23 | End: 2024-05-26 | Stop reason: HOSPADM

## 2024-05-23 RX ORDER — BUDESONIDE AND FORMOTEROL FUMARATE DIHYDRATE 80; 4.5 UG/1; UG/1
2 AEROSOL RESPIRATORY (INHALATION)
Status: DISCONTINUED | OUTPATIENT
Start: 2024-05-23 | End: 2024-05-26 | Stop reason: HOSPADM

## 2024-05-23 RX ORDER — ACETAMINOPHEN 325 MG/1
650 TABLET ORAL EVERY 6 HOURS PRN
Status: DISCONTINUED | OUTPATIENT
Start: 2024-05-23 | End: 2024-05-26 | Stop reason: HOSPADM

## 2024-05-23 RX ORDER — SODIUM CHLORIDE 0.9 % (FLUSH) 0.9 %
5-40 SYRINGE (ML) INJECTION PRN
Status: DISCONTINUED | OUTPATIENT
Start: 2024-05-23 | End: 2024-05-26 | Stop reason: HOSPADM

## 2024-05-23 RX ORDER — POTASSIUM CHLORIDE 20 MEQ/1
40 TABLET, EXTENDED RELEASE ORAL ONCE
Status: COMPLETED | OUTPATIENT
Start: 2024-05-23 | End: 2024-05-23

## 2024-05-23 RX ORDER — MAGNESIUM SULFATE IN WATER 40 MG/ML
2000 INJECTION, SOLUTION INTRAVENOUS ONCE
Status: COMPLETED | OUTPATIENT
Start: 2024-05-23 | End: 2024-05-23

## 2024-05-23 RX ORDER — LANOLIN/MINERAL OIL
LOTION (ML) TOPICAL PRN
Status: DISCONTINUED | OUTPATIENT
Start: 2024-05-23 | End: 2024-05-26 | Stop reason: HOSPADM

## 2024-05-23 RX ORDER — METOPROLOL SUCCINATE 25 MG/1
50 TABLET, EXTENDED RELEASE ORAL DAILY
Status: DISCONTINUED | OUTPATIENT
Start: 2024-05-23 | End: 2024-05-26 | Stop reason: HOSPADM

## 2024-05-23 RX ORDER — POLYETHYLENE GLYCOL 3350 17 G/17G
17 POWDER, FOR SOLUTION ORAL DAILY PRN
Status: DISCONTINUED | OUTPATIENT
Start: 2024-05-23 | End: 2024-05-26 | Stop reason: HOSPADM

## 2024-05-23 RX ORDER — ASPIRIN 81 MG/1
81 TABLET ORAL DAILY
Status: DISCONTINUED | OUTPATIENT
Start: 2024-05-24 | End: 2024-05-26 | Stop reason: HOSPADM

## 2024-05-23 RX ORDER — SODIUM CHLORIDE 9 MG/ML
INJECTION, SOLUTION INTRAVENOUS PRN
Status: DISCONTINUED | OUTPATIENT
Start: 2024-05-23 | End: 2024-05-26 | Stop reason: HOSPADM

## 2024-05-23 RX ORDER — ONDANSETRON 2 MG/ML
4 INJECTION INTRAMUSCULAR; INTRAVENOUS EVERY 6 HOURS PRN
Status: DISCONTINUED | OUTPATIENT
Start: 2024-05-23 | End: 2024-05-26 | Stop reason: HOSPADM

## 2024-05-23 RX ORDER — FUROSEMIDE 10 MG/ML
40 INJECTION INTRAMUSCULAR; INTRAVENOUS ONCE
Status: COMPLETED | OUTPATIENT
Start: 2024-05-23 | End: 2024-05-23

## 2024-05-23 RX ORDER — DILTIAZEM HYDROCHLORIDE 5 MG/ML
10 INJECTION INTRAVENOUS ONCE
Status: COMPLETED | OUTPATIENT
Start: 2024-05-23 | End: 2024-05-23

## 2024-05-23 RX ORDER — PANTOPRAZOLE SODIUM 40 MG/1
40 TABLET, DELAYED RELEASE ORAL
Status: DISCONTINUED | OUTPATIENT
Start: 2024-05-24 | End: 2024-05-26 | Stop reason: HOSPADM

## 2024-05-23 RX ADMIN — APIXABAN 5 MG: 5 TABLET, FILM COATED ORAL at 20:18

## 2024-05-23 RX ADMIN — MAGNESIUM SULFATE HEPTAHYDRATE 2000 MG: 40 INJECTION, SOLUTION INTRAVENOUS at 15:53

## 2024-05-23 RX ADMIN — POTASSIUM CHLORIDE 40 MEQ: 1500 TABLET, EXTENDED RELEASE ORAL at 14:52

## 2024-05-23 RX ADMIN — DILTIAZEM HYDROCHLORIDE 10 MG: 5 INJECTION, SOLUTION INTRAVENOUS at 18:29

## 2024-05-23 RX ADMIN — FUROSEMIDE 10 MG/HR: 10 INJECTION, SOLUTION INTRAMUSCULAR; INTRAVENOUS at 18:24

## 2024-05-23 RX ADMIN — FUROSEMIDE 40 MG: 10 INJECTION, SOLUTION INTRAMUSCULAR; INTRAVENOUS at 14:52

## 2024-05-23 RX ADMIN — SODIUM CHLORIDE 5 MG/HR: 900 INJECTION, SOLUTION INTRAVENOUS at 19:50

## 2024-05-23 RX ADMIN — BUDESONIDE AND FORMOTEROL FUMARATE DIHYDRATE 2 PUFF: 80; 4.5 AEROSOL RESPIRATORY (INHALATION) at 20:09

## 2024-05-23 RX ADMIN — SODIUM CHLORIDE, PRESERVATIVE FREE 10 ML: 5 INJECTION INTRAVENOUS at 20:18

## 2024-05-23 ASSESSMENT — PAIN - FUNCTIONAL ASSESSMENT: PAIN_FUNCTIONAL_ASSESSMENT: NONE - DENIES PAIN

## 2024-05-23 ASSESSMENT — PAIN SCALES - GENERAL
PAINLEVEL_OUTOF10: 0
PAINLEVEL_OUTOF10: 0

## 2024-05-23 ASSESSMENT — LIFESTYLE VARIABLES
HOW OFTEN DO YOU HAVE A DRINK CONTAINING ALCOHOL: NEVER
HOW MANY STANDARD DRINKS CONTAINING ALCOHOL DO YOU HAVE ON A TYPICAL DAY: PATIENT DOES NOT DRINK

## 2024-05-23 NOTE — ED NOTES
TRANSFER - OUT REPORT:    Verbal report given to SHAWN Hargrove on Tay Conklin  being transferred to [facility] for routine progression of patient care       Report consisted of patient's Situation, Background, Assessment and   Recommendations(SBAR).     Information from the following report(s) ED SBAR was reviewed with the receiving nurse.    Lines:   Peripheral IV 05/23/24 Distal;Right;Anterior Cephalic (Active)   Site Assessment Clean, dry & intact 05/23/24 1349   Line Status Blood return noted 05/23/24 1349   Phlebitis Assessment No symptoms 05/23/24 1349   Infiltration Assessment 0 05/23/24 1349        Opportunity for questions and clarification was provided.      Patient transported with:  Registered Nurse      Kd Cooper RN  05/23/24 5072

## 2024-05-23 NOTE — TELEPHONE ENCOUNTER
Pt calling as Dr Broderick requested him to increase lasix from once a day to 2 a day for water gain.  Pt gained 3-1/2 lbs in 2 days. Wants to know if he needs to go to the hospital and if so which one as he is in [place].

## 2024-05-23 NOTE — PROGRESS NOTES
TRANSFER - IN REPORT:    Verbal report received from Snehal on Tay Conklin  being received from ER for routine progression of patient care      Report consisted of patient's Situation, Background, Assessment and   Recommendations(SBAR).     Information from the following report(s) ED Encounter Summary, ED SBAR, Adult Overview, Intake/Output, Recent Results, Cardiac Rhythm Atrial Flutter/Tachy , and Neuro Assessment was reviewed with the receiving nurse.    Opportunity for questions and clarification was provided.      Assessment completed upon patient's arrival to unit and care assumed.      Beena Causey RN  05/23/24 3974

## 2024-05-23 NOTE — TELEPHONE ENCOUNTER
Left message on voicemail for patient to call this triage nurse. In message, advised patient to go to [facility] ER for immediate evaluation, if he has gained 2 1/2 lbs and has not improved after 5/20/24 office visit with Dr. Broderick. Advised patient okay to go to [facility] ER, but he will be transferred to [facility], if he needs cardiac procedure or needs to be admitted for heart problem.

## 2024-05-23 NOTE — PROGRESS NOTES
4 Eyes Skin Assessment     NAME:  Tay Conklin  YOB: 1960  MEDICAL RECORD NUMBER:  [id number]    The patient is being assessed for  Admission    I agree that at least one RN has performed a thorough Head to Toe Skin Assessment on the patient. ALL assessment sites listed below have been assessed.      Areas assessed by both nurses:    Head, Face, Ears, Shoulders, Back, Chest, Arms, Elbows, Hands, Sacrum. Buttock, Coccyx, Ischium, and Legs. Feet and Heels        Does the Patient have a Wound? No noted wound(s)       Jeremy Prevention initiated by RN: No  Wound Care Orders initiated by RN: No    Pressure Injury (Stage 3,4, Unstageable, DTI, NWPT, and Complex wounds) if present, place Wound referral order by RN under : No    New Ostomies, if present place, Ostomy referral order under : No     Nurse 1 eSignature: Electronically signed by Beena Causey RN on 5/23/24 at 7:23 PM EDT    **SHARE this note so that the co-signing nurse can place an eSignature**    Nurse 2 eSignature: Electronically signed by Bel Ramos RN on 5/23/24 at 7:24 PM EDT

## 2024-05-23 NOTE — H&P
[address], [address]  [place] [address]  PHONE: [phone]         CONSULT        24      NAME:  Tay Conklin  : 1960  MRN: [id number]      SUBJECTIVE:   Tay Conklin is a 64 y.o. male seen for a consultation visit regarding the following:     Chief Complaint   Patient presents with    Shortness of Breath            HPI:    64-year-old gentleman with a 6-day history of progressively worsening lower extremity edema and scrotal edema.  He has had a 13 pound weight gain.  He denies any chest pain shortness of breath orthopnea PND palpitations or syncope.  Ejection fraction is normal with PA pressures of 75 mmHg    Hospital Problems             Last Modified POA    * (Principal) Acute cor pulmonale (HCC) 2024 Yes    Pulmonary HTN (HCC) 2024 Yes    Atrial flutter ([facility]) 2024 Yes    Anemia 2024 Yes    LILA (obstructive sleep apnea) (Chronic) 2024 Yes    COPD (chronic obstructive pulmonary disease) ([facility]) 2024 Yes    Obesity (Chronic) 2024 Yes     No Known Allergies  Past Medical History:   Diagnosis Date    COPD (chronic obstructive pulmonary disease) ([facility])      Past Surgical History:   Procedure Laterality Date    COLONOSCOPY N/A 3/5/2024    COLONOSCOPY DIAGNOSTIC performed by Tono Napoles MD at [facility] ENDOSCOPY    UPPER GASTROINTESTINAL ENDOSCOPY N/A 3/5/2024    ESOPHAGOGASTRODUODENOSCOPY performed by Tono Napoles MD at [facility] ENDOSCOPY     No family history on file.  Social History     Tobacco Use    Smoking status: Former     Current packs/day: 0.00     Average packs/day: 1.5 packs/day for 37.0 years (55.5 ttl pk-yrs)     Types: Cigarettes     Start date:      Quit date: 2014     Years since quitting: 10.3    Smokeless tobacco: Never   Substance Use Topics    Alcohol use: Not Currently           ROS:    Constitution: Negative for fever.   Eyes: Negative for blurred vision.   Respiratory: Negative for cough.    Endocrine:  Negative for cold intolerance and heat intolerance.   Skin: Negative for rash.   Musculoskeletal: Negative for myalgias.   Gastrointestinal: Negative for diarrhea, nausea and vomiting.   Genitourinary: Negative for dysuria.   Neurological: Negative for headaches and numbness.          PHYSICAL EXAM:     BP (!) 134/97   Pulse (!) 116   Temp 98 °F (36.7 °C) (Oral)   Resp 18   Ht 1.88 m (6' 2\")   Wt (!) 154.7 kg (341 lb)   SpO2 93%   BMI 43.78 kg/m²    Constitutional: Oriented to person, place, and time. Appears well-developed and well-nourished.   Head: Normocephalic and atraumatic.   Neck: Neck supple.   Cardiovascular: Normal rate and regular rhythm with no murmur -No JVP  Pulmonary/Chest: Breath sounds normal.   Abdominal: Soft.   Musculoskeletal: No edema.   Neurological: Alert and oriented to person, place, and time.   Skin: Skin is warm and dry.   Psychiatric: Normal mood and affect.   Vitals reviewed        Medical problems and test results were reviewed with the patient today.     Wt Readings from Last 3 Encounters:   05/23/24 (!) 154.7 kg (341 lb)   05/20/24 (!) 154.7 kg (341 lb)   05/17/24 (!) 154.2 kg (340 lb)          Recent Results (from the past 672 hour(s))   EKG 12 Lead    Collection Time: 05/17/24  6:18 PM   Result Value Ref Range    Ventricular Rate 117 BPM    Atrial Rate 116 BPM    QRS Duration 124 ms    Q-T Interval 371 ms    QTc Calculation (Bazett) 518 ms    R Axis 45 degrees    T Axis 57 degrees    Diagnosis       Atrial flutter with predominant 2:1 AV block  IVCD, consider atypical RBBB  When compared with ECG of 3/1/24  atrial flutter has replaced sinus rhythm      Confirmed by MD NAQVI DANIEL ([id number]) on 5/17/2024 10:26:32 PM     CBC with Auto Differential    Collection Time: 05/17/24  6:25 PM   Result Value Ref Range    WBC 9.7 4.3 - 11.1 K/uL    RBC 3.86 (L) 4.23 - 5.60 M/uL    Hemoglobin 7.9 (L) 13.6 - 17.2 g/dL    Hematocrit 27.9 (L) 41.1 - 50.3 %    MCV 72.3 (L) 82.0 - 102.0 FL     MCH 20.5 (L) 26.1 - 32.9 PG    MCHC 28.3 (L) 31.4 - 35.0 g/dL    RDW 22.5 (H) 11.9 - 14.6 %    Platelets 647 (H) 150 - 450 K/uL    MPV 9.0 (L) 9.4 - 12.3 FL    nRBC 0.02 0.0 - 0.2 K/uL    Differential Type AUTOMATED      Neutrophils % 64 43 - 78 %    Lymphocytes % 21 13 - 44 %    Monocytes % 9 4.0 - 12.0 %    Eosinophils % 4 0.5 - 7.8 %    Basophils % 1 0.0 - 2.0 %    Immature Granulocytes % 0 0.0 - 5.0 %    Neutrophils Absolute 6.3 1.7 - 8.2 K/UL    Lymphocytes Absolute 2.1 0.5 - 4.6 K/UL    Monocytes Absolute 0.9 0.1 - 1.3 K/UL    Eosinophils Absolute 0.4 0.0 - 0.8 K/UL    Basophils Absolute 0.1 0.0 - 0.2 K/UL    Immature Granulocytes Absolute 0.0 0.0 - 0.5 K/UL   Comprehensive Metabolic Panel    Collection Time: 05/17/24  6:25 PM   Result Value Ref Range    Sodium 141 133 - 143 mmol/L    Potassium 3.5 3.5 - 5.1 mmol/L    Chloride 97 (L) 98 - 107 mmol/L    CO2 30 21 - 32 mmol/L    Anion Gap 14 (H) 2 - 11 mmol/L    Glucose 104 (H) 65 - 100 mg/dL    BUN 28 (H) 8 - 23 MG/DL    Creatinine 1.50 0.8 - 1.5 MG/DL    Est, Glom Filt Rate 52 (L) >60 ml/min/1.73m2    Calcium 9.2 8.3 - 10.4 MG/DL    Total Bilirubin 0.9 0.2 - 1.1 MG/DL    ALT 21 13.0 - 61.0 U/L    AST 24 15 - 37 U/L    Alk Phosphatase 71 45.0 - 117.0 U/L    Total Protein 6.3 (L) 6.4 - 8.2 g/dL    Albumin 4.0 3.2 - 4.6 g/dL    Globulin 2.3 (L) 2.8 - 4.5 g/dL    Albumin/Globulin Ratio 1.7 (H) 0.4 - 1.6     Brain Natriuretic Peptide    Collection Time: 05/17/24  6:25 PM   Result Value Ref Range    NT Pro-BNP 2,015 (H) 0 - 450 PG/ML   EKG 12 Lead    Collection Time: 05/23/24  1:25 PM   Result Value Ref Range    Ventricular Rate 117 BPM    Atrial Rate 117 BPM    P-R Interval 152 ms    QRS Duration 106 ms    Q-T Interval 326 ms    QTc Calculation (Bazett) 454 ms    R Axis 87 degrees    T Axis 95 degrees    Diagnosis       Atrial flutter with 2 to 1 block  Low voltage QRS  Incomplete right bundle branch block  Cannot rule out Anterior infarct , age

## 2024-05-23 NOTE — ED TRIAGE NOTES
Pt is ambulatory to the ER, PT reports SOB of breath upon walking, pt is on 3L nc at  baseline. Pt has hx of CHF and was told if they gain 5 pounds in a week to come to the ER, Pt has gained 3.2lbs in 2 days, and has increased fluid retention in groin area.

## 2024-05-23 NOTE — ED PROVIDER NOTES
Emergency Department Provider Note       PCP: Malu Shaw PA   Age: 64 y.o.   Sex: male     DISPOSITION Decision To Admit 05/23/2024 03:35:37 PM       ICD-10-CM    1. Acute on chronic congestive heart failure, unspecified heart failure type (HCC)  I50.9       2. Hypomagnesemia  E83.42           Medical Decision Making     64-year-old male presents to the emergency department complaining of shortness of breath and weight gain.  History of CHF has been doubling up on his Lasix with no improvement.  Reports swelling to his testicles and groin over the past few days.  Reports he was told by his cardiologist to seek medical attention for any significant weight gain.  Denies chest pain or back pain.  Reports worsening shortness of breath with ambulation.  Is on supplemental oxygen.  At the time of my evaluation patient is on 3 L, O2 saturation dropped for ambulation to the mid 80s.  Did slow to recover in the triage room.  Is tachycardic as well.  I did advise charge nurse the patient needed bed.  Will obtain a broad-based workup.  Lab work showed normal white count, baseline low hgb,  initial troponin is 29, will repeat.  Magnesium is 1.7, patient given IV magnesium replacement.  Patient with BUN of 30, creatinine is 1.4, normal LFTs, BNP is 1300, chest x-ray with diffuse interstitial prominence similar compared to prior chest x-ray.  Patient was given IV Lasix.  Given his worsening shortness of breath as well as his weight gain, discussed with cardiology who agreed with admission.  Patient voiced understanding and agreement.     1 or more acute illnesses that pose a threat to life or bodily function.       I independently ordered and reviewed each unique test.  I reviewed external records: provider visit note from outside specialist.     I interpreted the X-rays no pneumothorax.  My Independent EKG Interpretation: sinus rhythm, no evidence of arrhythmia      ST Segments:Nonspecific ST segments - NO STEMI    Rate: 117  The patient was admitted and I have discussed patient management with the admitting provider.          History     64-year-old male presents to the emergency department complaining of shortness of breath and weight gain.  History of CHF has been doubling up on his Lasix with no improvement.  Reports swelling to his testicles and groin over the past few days.  Reports he was told by his cardiologist to seek medical attention for any significant weight gain.  Denies chest pain or back pain.  Reports worsening shortness of breath with ambulation.         ROS     Review of Systems     Physical Exam     Vitals signs and nursing note reviewed:  Vitals:    05/23/24 1324 05/23/24 1452   BP: (!) 111/92 (!) 125/94   Pulse: (!) 112    Resp: 22    Temp: 98 °F (36.7 °C)    TempSrc: Oral    SpO2: 90%    Weight: (!) 154.7 kg (341 lb)    Height: 1.88 m (6' 2\")       Physical Exam  Vitals and nursing note reviewed.   Constitutional:       General: He is not in acute distress.     Appearance: Normal appearance. He is ill-appearing.   HENT:      Head: Normocephalic.      Nose: Nose normal.      Mouth/Throat:      Mouth: Mucous membranes are moist.   Eyes:      Extraocular Movements: Extraocular movements intact.   Cardiovascular:      Rate and Rhythm: Regular rhythm. Tachycardia present.      Pulses: Normal pulses.      Heart sounds: Normal heart sounds.   Pulmonary:      Effort: Pulmonary effort is normal. No respiratory distress.      Breath sounds: Normal breath sounds.   Abdominal:      General: Abdomen is flat.      Palpations: Abdomen is soft.      Tenderness: There is no abdominal tenderness.   Musculoskeletal:         General: No swelling. Normal range of motion.      Cervical back: Normal range of motion. No rigidity.      Right lower leg: Edema present.      Left lower leg: Edema present.      Comments: 2+ pitting edema bilateral lower extremities   Skin:     General: Skin is warm.      Capillary Refill: Capillary

## 2024-05-24 ENCOUNTER — ANESTHESIA (OUTPATIENT)
Dept: TELEMETRY | Age: 64
End: 2024-05-24
Payer: COMMERCIAL

## 2024-05-24 ENCOUNTER — ANESTHESIA EVENT (OUTPATIENT)
Dept: TELEMETRY | Age: 64
End: 2024-05-24
Payer: COMMERCIAL

## 2024-05-24 ENCOUNTER — APPOINTMENT (OUTPATIENT)
Dept: CARDIAC CATH/INVASIVE PROCEDURES | Age: 64
DRG: 292 | End: 2024-05-24
Attending: INTERNAL MEDICINE
Payer: COMMERCIAL

## 2024-05-24 LAB
ANION GAP SERPL CALC-SCNC: 10 MMOL/L (ref 9–18)
BUN SERPL-MCNC: 26 MG/DL (ref 8–23)
CALCIUM SERPL-MCNC: 9.1 MG/DL (ref 8.8–10.2)
CHLORIDE SERPL-SCNC: 98 MMOL/L (ref 98–107)
CO2 SERPL-SCNC: 34 MMOL/L (ref 20–28)
CREAT SERPL-MCNC: 1.36 MG/DL (ref 0.8–1.3)
ECHO AO SINUS VALSALVA DIAM: 4.2 CM
ECHO AO SINUS VALSALVA INDEX: 1.56 CM/M2
ECHO BSA: 2.83 M2
ERYTHROCYTE [DISTWIDTH] IN BLOOD BY AUTOMATED COUNT: 22.5 % (ref 11.9–14.6)
GLUCOSE SERPL-MCNC: 97 MG/DL (ref 70–99)
HCT VFR BLD AUTO: 29.9 % (ref 41.1–50.3)
HGB BLD-MCNC: 8 G/DL (ref 13.6–17.2)
MAGNESIUM SERPL-MCNC: 1.8 MG/DL (ref 1.8–2.4)
MCH RBC QN AUTO: 19.6 PG (ref 26.1–32.9)
MCHC RBC AUTO-ENTMCNC: 26.8 G/DL (ref 31.4–35)
MCV RBC AUTO: 73.1 FL (ref 82–102)
NRBC # BLD: 0.02 K/UL (ref 0–0.2)
PLATELET # BLD AUTO: 661 K/UL (ref 150–450)
PMV BLD AUTO: 8.7 FL (ref 9.4–12.3)
POTASSIUM SERPL-SCNC: 4.1 MMOL/L (ref 3.5–5.1)
RBC # BLD AUTO: 4.09 M/UL (ref 4.23–5.6)
SODIUM SERPL-SCNC: 141 MMOL/L (ref 136–145)
WBC # BLD AUTO: 12.6 K/UL (ref 4.3–11.1)

## 2024-05-24 PROCEDURE — 2700000000 HC OXYGEN THERAPY PER DAY

## 2024-05-24 PROCEDURE — 93320 DOPPLER ECHO COMPLETE: CPT | Performed by: INTERNAL MEDICINE

## 2024-05-24 PROCEDURE — 93312 ECHO TRANSESOPHAGEAL: CPT | Performed by: INTERNAL MEDICINE

## 2024-05-24 PROCEDURE — 94761 N-INVAS EAR/PLS OXIMETRY MLT: CPT

## 2024-05-24 PROCEDURE — 99153 MOD SED SAME PHYS/QHP EA: CPT

## 2024-05-24 PROCEDURE — 2580000003 HC RX 258: Performed by: NURSE PRACTITIONER

## 2024-05-24 PROCEDURE — 85027 COMPLETE CBC AUTOMATED: CPT

## 2024-05-24 PROCEDURE — B24BZZ4 ULTRASONOGRAPHY OF HEART WITH AORTA, TRANSESOPHAGEAL: ICD-10-PCS | Performed by: INTERNAL MEDICINE

## 2024-05-24 PROCEDURE — 99152 MOD SED SAME PHYS/QHP 5/>YRS: CPT

## 2024-05-24 PROCEDURE — 5A2204Z RESTORATION OF CARDIAC RHYTHM, SINGLE: ICD-10-PCS | Performed by: INTERNAL MEDICINE

## 2024-05-24 PROCEDURE — 6360000002 HC RX W HCPCS: Performed by: INTERNAL MEDICINE

## 2024-05-24 PROCEDURE — 6360000002 HC RX W HCPCS: Performed by: NURSE PRACTITIONER

## 2024-05-24 PROCEDURE — 99152 MOD SED SAME PHYS/QHP 5/>YRS: CPT | Performed by: INTERNAL MEDICINE

## 2024-05-24 PROCEDURE — 2140000000 HC CCU INTERMEDIATE R&B

## 2024-05-24 PROCEDURE — 92960 CARDIOVERSION ELECTRIC EXT: CPT | Performed by: INTERNAL MEDICINE

## 2024-05-24 PROCEDURE — C8925 2D TEE W OR W/O FOL W/CON,IN: HCPCS

## 2024-05-24 PROCEDURE — 80048 BASIC METABOLIC PNL TOTAL CA: CPT

## 2024-05-24 PROCEDURE — 93005 ELECTROCARDIOGRAM TRACING: CPT | Performed by: INTERNAL MEDICINE

## 2024-05-24 PROCEDURE — 94660 CPAP INITIATION&MGMT: CPT

## 2024-05-24 PROCEDURE — 6370000000 HC RX 637 (ALT 250 FOR IP): Performed by: NURSE PRACTITIONER

## 2024-05-24 PROCEDURE — 2500000003 HC RX 250 WO HCPCS: Performed by: NURSE PRACTITIONER

## 2024-05-24 PROCEDURE — 83735 ASSAY OF MAGNESIUM: CPT

## 2024-05-24 PROCEDURE — 94640 AIRWAY INHALATION TREATMENT: CPT

## 2024-05-24 PROCEDURE — 94760 N-INVAS EAR/PLS OXIMETRY 1: CPT

## 2024-05-24 PROCEDURE — 36415 COLL VENOUS BLD VENIPUNCTURE: CPT

## 2024-05-24 PROCEDURE — 93325 DOPPLER ECHO COLOR FLOW MAPG: CPT | Performed by: INTERNAL MEDICINE

## 2024-05-24 RX ORDER — FENTANYL CITRATE 50 UG/ML
INJECTION, SOLUTION INTRAMUSCULAR; INTRAVENOUS PRN
Status: COMPLETED | OUTPATIENT
Start: 2024-05-24 | End: 2024-05-24

## 2024-05-24 RX ORDER — MIDAZOLAM HYDROCHLORIDE 1 MG/ML
INJECTION INTRAMUSCULAR; INTRAVENOUS PRN
Status: COMPLETED | OUTPATIENT
Start: 2024-05-24 | End: 2024-05-24

## 2024-05-24 RX ADMIN — APIXABAN 5 MG: 5 TABLET, FILM COATED ORAL at 20:30

## 2024-05-24 RX ADMIN — SODIUM CHLORIDE, PRESERVATIVE FREE 10 ML: 5 INJECTION INTRAVENOUS at 20:05

## 2024-05-24 RX ADMIN — ASPIRIN 81 MG: 81 TABLET, COATED ORAL at 08:45

## 2024-05-24 RX ADMIN — SODIUM CHLORIDE 15 MG/HR: 900 INJECTION, SOLUTION INTRAVENOUS at 04:31

## 2024-05-24 RX ADMIN — PANTOPRAZOLE SODIUM 40 MG: 40 TABLET, DELAYED RELEASE ORAL at 16:05

## 2024-05-24 RX ADMIN — MIDAZOLAM 1 MG: 1 INJECTION INTRAMUSCULAR; INTRAVENOUS at 11:05

## 2024-05-24 RX ADMIN — FENTANYL CITRATE 25 MCG: 50 INJECTION, SOLUTION INTRAMUSCULAR; INTRAVENOUS at 11:02

## 2024-05-24 RX ADMIN — FUROSEMIDE 10 MG/HR: 10 INJECTION, SOLUTION INTRAMUSCULAR; INTRAVENOUS at 05:05

## 2024-05-24 RX ADMIN — BUDESONIDE AND FORMOTEROL FUMARATE DIHYDRATE 2 PUFF: 80; 4.5 AEROSOL RESPIRATORY (INHALATION) at 07:50

## 2024-05-24 RX ADMIN — TIOTROPIUM BROMIDE INHALATION SPRAY 2 PUFF: 3.12 SPRAY, METERED RESPIRATORY (INHALATION) at 07:50

## 2024-05-24 RX ADMIN — METOPROLOL SUCCINATE 50 MG: 25 TABLET, FILM COATED, EXTENDED RELEASE ORAL at 08:45

## 2024-05-24 RX ADMIN — ACETAMINOPHEN 650 MG: 325 TABLET ORAL at 20:03

## 2024-05-24 RX ADMIN — PANTOPRAZOLE SODIUM 40 MG: 40 TABLET, DELAYED RELEASE ORAL at 05:03

## 2024-05-24 RX ADMIN — FUROSEMIDE 10 MG/HR: 10 INJECTION, SOLUTION INTRAMUSCULAR; INTRAVENOUS at 14:06

## 2024-05-24 RX ADMIN — FENTANYL CITRATE 25 MCG: 50 INJECTION, SOLUTION INTRAMUSCULAR; INTRAVENOUS at 11:09

## 2024-05-24 RX ADMIN — MIDAZOLAM 2 MG: 1 INJECTION INTRAMUSCULAR; INTRAVENOUS at 11:04

## 2024-05-24 RX ADMIN — MIDAZOLAM 2 MG: 1 INJECTION INTRAMUSCULAR; INTRAVENOUS at 11:02

## 2024-05-24 RX ADMIN — SODIUM CHLORIDE, PRESERVATIVE FREE 10 ML: 5 INJECTION INTRAVENOUS at 08:45

## 2024-05-24 RX ADMIN — BUDESONIDE AND FORMOTEROL FUMARATE DIHYDRATE 2 PUFF: 80; 4.5 AEROSOL RESPIRATORY (INHALATION) at 19:30

## 2024-05-24 RX ADMIN — MIDAZOLAM 2 MG: 1 INJECTION INTRAMUSCULAR; INTRAVENOUS at 11:05

## 2024-05-24 RX ADMIN — APIXABAN 5 MG: 5 TABLET, FILM COATED ORAL at 08:45

## 2024-05-24 ASSESSMENT — PAIN DESCRIPTION - DESCRIPTORS: DESCRIPTORS: ACHING

## 2024-05-24 ASSESSMENT — PAIN SCALES - GENERAL
PAINLEVEL_OUTOF10: 0
PAINLEVEL_OUTOF10: 3
PAINLEVEL_OUTOF10: 0

## 2024-05-24 ASSESSMENT — PAIN DESCRIPTION - LOCATION: LOCATION: GENERALIZED

## 2024-05-24 ASSESSMENT — PAIN SCALES - WONG BAKER: WONGBAKER_NUMERICALRESPONSE: NO HURT

## 2024-05-24 ASSESSMENT — PAIN DESCRIPTION - ORIENTATION: ORIENTATION: MID;POSTERIOR

## 2024-05-24 NOTE — PROGRESS NOTES
Patient chewing on oral airway, arouses to verbal stimuli. Oral airway removed. Attempted to removed non-rebreather and use nasal cannula. O2 levels declined. Non-rebreather replaced at 6L. Will continue to monitor.

## 2024-05-24 NOTE — CARE COORDINATION
Patient admitted with acute cor pulmonale. On room air. Cardizem and Furosemide drips.   CM met with patient for assessment.Patient SO in the room. A&O X 4. Verfiied PCP, demographic, and insurance benefit through his work. Works FT. Independent in living. Drives.DME: BiPAP serviced through [employer]. O2 with [employer] at 3 lpm bled into BiPAP.  No history of supportive care or STR.   CM will follow and remain available for consult.     05/24/24 1001   Service Assessment   Patient Orientation Alert and Oriented   Cognition Alert   History Provided By Patient   Primary Caregiver Self   Accompanied By/Relationship SO   Support Systems Spouse/Significant Other;Children   Patient's Healthcare Decision Maker is: Legal Next of Kin  (Daughter, Gricel)   PCP Verified by CM Yes  ([facility])   Last Visit to PCP Within last year   Prior Functional Level Independent in ADLs/IADLs   Current Functional Level Independent in ADLs/IADLs   Can patient return to prior living arrangement Yes   Ability to make needs known: Good   Family able to assist with home care needs: Yes   Would you like for me to discuss the discharge plan with any other family members/significant others, and if so, who? No   Financial Resources Other (Comment)  (Commercial)   Community Resources None   Social/Functional History   Lives With Alone   Type of Home Apartment   Home Layout One level   Home Access Stairs to enter with rails   Receives Help From Family;Friend(s)   ADL Assistance Independent   Homemaking Assistance Independent   Ambulation Assistance Independent   Transfer Assistance Independent   Active  Yes   Mode of Transportation Car   Occupation Full time employment   Discharge Planning   Type of Residence Apartment   Living Arrangements Alone   Current Services Prior To Admission C-pap  (Bipap through [employer]. O2 at 3 lpm bled in [employer].)   Potential Assistance Needed N/A   DME Ordered? No   Potential Assistance Purchasing

## 2024-05-24 NOTE — PROGRESS NOTES
5/24/2024 3:04 PM    Admit Date: 5/23/2024    Admit Diagnosis: Acute cor pulmonale (HCC) [I26.09]  Hypomagnesemia [E83.42]  Acute on chronic congestive heart failure, unspecified heart failure type (HCC) [I50.9]      Subjective:   No chest pain or shortness of breath      Objective:    /89   Pulse 77   Temp 97.5 °F (36.4 °C) (Oral)   Resp 18   Ht 1.88 m (6' 2\")   Wt (!) 149.5 kg (329 lb 8 oz)   SpO2 93%   BMI 42.31 kg/m²     Physical Exam:  General-Well Developed, Well Nourished, No Acute Distress, Alert & Oriented x 3, appropriate mood.  Neck- supple, no JVD  CV- regular rate and rhythm no MRG  Lung- clear bilaterally  Abd- soft, nontender, nondistended  Ext- no edema bilaterally.  Skin- warm and dry        Data Review:   Recent Labs     05/24/24  0611      K 4.1   BUN 26*   WBC 12.6*   HGB 8.0*   HCT 29.9*   *       Assessment/Plan:     Active Hospital Problems    Pulmonary HTN (HCC)      Acute cor pulmonale (HCC) good diuresis.  He feels better today.  Continue Lasix drip and will plan NADINE cardioversion for atrial flutter today.      Atrial flutter (HCC)      Anemia      COPD (chronic obstructive pulmonary disease) (HCC)      Obesity      LILA (obstructive sleep apnea)

## 2024-05-24 NOTE — PROGRESS NOTES
Patient awake and talking, following commands. Dr Thomas to see patient, O2 moved to nasal cannula at 3L.

## 2024-05-24 NOTE — PROGRESS NOTES
ALEJANDRO/CVN completed with Dr Figueredo  Sedation Versed 8 mg and Fentanyl 50 mcg. POM mask used during procedure due to patient's sleep apena and high right ventricular pressures. ALEJANDRO probe inserted with no issues, during procedure patient's oxygen saturation began to decline despite maximum use of oxygen. Alejandro probe removed and chin-thrust movement used that increased O2 levels. Once oxygen levels returned to 90% patient was converted using 360 synchronized joules to NSR. Anesthesia was called to assist Dr. Thomas arrived quickly. Oral airway was placed. Patient was able to maintain oxygen levels above 90% via 8L non-rebreather and oral airway. Will monitor patient closely. Respiratory to bring by-pap if needed.

## 2024-05-24 NOTE — PROGRESS NOTES
TRANSFER - OUT REPORT:    Verbal report given to Brian RN(name) on Tay Conklin  being transferred to [facility](unit) for routine post-op       Report consisted of patient’s Situation, Background, Assessment and   Recommendations(SBAR).     Information from the following report(s) Surgery Report was reviewed with the receiving nurse.    Opportunity for questions and clarification was provided.      Patient transported with:   O2 @ 3 liters

## 2024-05-24 NOTE — PROGRESS NOTES
Patient noncompliant with hourly BP for Cardizem gtt and strict I&O measurements for Lasix gtt. Patient educated on the importance of following these protocols. Patient verbalized understanding but not willing to use BSC or urinal. Patient now has BP cuff on and told to call RN if he takes it off to put it back on.

## 2024-05-24 NOTE — PROGRESS NOTES
Transesophageal Echo Note  - Indication: Atrial flutter with rapid ventricular response  - pt underwent successful NADINE today in cath holding  - start 1042  - stop 1131  - sedation: 8 mg IV Midazolam, 50 mcg Fentanyl IV given by Hanane Good RN under my direct supervision. Direct monitoring of vital signs and respiratory status throughout the procedure.   - An independent trained observer pushed medications at my direction. We monitored the patient's level of consciousness and vital signs/physiologic status throughout the procedure duration (see start and stop times above).   - No complications, pt in stable condition  - NADINE Brief Findings: No LA thrombus, no left atrial appendage thrombus, RV function appears decreased. Aneurysmal interatrial septum bowing right to left.   - Complete NADINE report to follow.   -Patient with brief decrease in peripheral oxygen saturations into the 80s during the procedure appearing consistent with sleep apnea history.  Nonrebreather mask improved saturations into the mid to upper 90s.  Oral airway was placed by anesthesiology which improved oxygen saturation. No prolonged episodes of hypoxia.   - appropriate for DC Cardioversion attempt    Direct Current Cardioversion  - Indication : atrial flutter   - synchronized DC Cardioversion 360 J x 1   - Results: sinus rhythm   - 12 lead EKG pending at this time    - OK for oral intake at 1331  - No driving or heavy machine operation today.   - Results will be made available to primary cardiology attending Dr. Gurjit Kaye.

## 2024-05-25 LAB
ANION GAP SERPL CALC-SCNC: 10 MMOL/L (ref 9–18)
BUN SERPL-MCNC: 35 MG/DL (ref 8–23)
CALCIUM SERPL-MCNC: 9 MG/DL (ref 8.8–10.2)
CHLORIDE SERPL-SCNC: 97 MMOL/L (ref 98–107)
CO2 SERPL-SCNC: 33 MMOL/L (ref 20–28)
CREAT SERPL-MCNC: 1.84 MG/DL (ref 0.8–1.3)
EKG ATRIAL RATE: 83 BPM
EKG DIAGNOSIS: NORMAL
EKG P AXIS: -13 DEGREES
EKG P-R INTERVAL: 174 MS
EKG Q-T INTERVAL: 398 MS
EKG QRS DURATION: 108 MS
EKG QTC CALCULATION (BAZETT): 467 MS
EKG R AXIS: 51 DEGREES
EKG T AXIS: 64 DEGREES
EKG VENTRICULAR RATE: 83 BPM
ERYTHROCYTE [DISTWIDTH] IN BLOOD BY AUTOMATED COUNT: 22.3 % (ref 11.9–14.6)
GLUCOSE SERPL-MCNC: 97 MG/DL (ref 70–99)
HCT VFR BLD AUTO: 30.7 % (ref 41.1–50.3)
HGB BLD-MCNC: 8.1 G/DL (ref 13.6–17.2)
MAGNESIUM SERPL-MCNC: 1.9 MG/DL (ref 1.8–2.4)
MCH RBC QN AUTO: 19.7 PG (ref 26.1–32.9)
MCHC RBC AUTO-ENTMCNC: 26.4 G/DL (ref 31.4–35)
MCV RBC AUTO: 74.5 FL (ref 82–102)
NRBC # BLD: 0.02 K/UL (ref 0–0.2)
PLATELET # BLD AUTO: 693 K/UL (ref 150–450)
PMV BLD AUTO: 9.1 FL (ref 9.4–12.3)
POTASSIUM SERPL-SCNC: 4.3 MMOL/L (ref 3.5–5.1)
RBC # BLD AUTO: 4.12 M/UL (ref 4.23–5.6)
SODIUM SERPL-SCNC: 140 MMOL/L (ref 136–145)
WBC # BLD AUTO: 11.5 K/UL (ref 4.3–11.1)

## 2024-05-25 PROCEDURE — 99232 SBSQ HOSP IP/OBS MODERATE 35: CPT | Performed by: INTERNAL MEDICINE

## 2024-05-25 PROCEDURE — 83735 ASSAY OF MAGNESIUM: CPT

## 2024-05-25 PROCEDURE — 93010 ELECTROCARDIOGRAM REPORT: CPT | Performed by: INTERNAL MEDICINE

## 2024-05-25 PROCEDURE — 6370000000 HC RX 637 (ALT 250 FOR IP): Performed by: NURSE PRACTITIONER

## 2024-05-25 PROCEDURE — 2580000003 HC RX 258: Performed by: NURSE PRACTITIONER

## 2024-05-25 PROCEDURE — 2140000000 HC CCU INTERMEDIATE R&B

## 2024-05-25 PROCEDURE — 5A09357 ASSISTANCE WITH RESPIRATORY VENTILATION, LESS THAN 24 CONSECUTIVE HOURS, CONTINUOUS POSITIVE AIRWAY PRESSURE: ICD-10-PCS | Performed by: INTERNAL MEDICINE

## 2024-05-25 PROCEDURE — 85027 COMPLETE CBC AUTOMATED: CPT

## 2024-05-25 PROCEDURE — 80048 BASIC METABOLIC PNL TOTAL CA: CPT

## 2024-05-25 PROCEDURE — 94660 CPAP INITIATION&MGMT: CPT

## 2024-05-25 PROCEDURE — 94640 AIRWAY INHALATION TREATMENT: CPT

## 2024-05-25 PROCEDURE — 6360000002 HC RX W HCPCS: Performed by: NURSE PRACTITIONER

## 2024-05-25 PROCEDURE — 36415 COLL VENOUS BLD VENIPUNCTURE: CPT

## 2024-05-25 RX ADMIN — FUROSEMIDE 10 MG/HR: 10 INJECTION, SOLUTION INTRAMUSCULAR; INTRAVENOUS at 00:11

## 2024-05-25 RX ADMIN — PANTOPRAZOLE SODIUM 40 MG: 40 TABLET, DELAYED RELEASE ORAL at 16:19

## 2024-05-25 RX ADMIN — SODIUM CHLORIDE, PRESERVATIVE FREE 10 ML: 5 INJECTION INTRAVENOUS at 21:26

## 2024-05-25 RX ADMIN — ASPIRIN 81 MG: 81 TABLET, COATED ORAL at 08:30

## 2024-05-25 RX ADMIN — METOPROLOL SUCCINATE 50 MG: 25 TABLET, FILM COATED, EXTENDED RELEASE ORAL at 08:30

## 2024-05-25 RX ADMIN — APIXABAN 5 MG: 5 TABLET, FILM COATED ORAL at 21:22

## 2024-05-25 RX ADMIN — PANTOPRAZOLE SODIUM 40 MG: 40 TABLET, DELAYED RELEASE ORAL at 06:39

## 2024-05-25 RX ADMIN — APIXABAN 5 MG: 5 TABLET, FILM COATED ORAL at 08:30

## 2024-05-25 RX ADMIN — SODIUM CHLORIDE, PRESERVATIVE FREE 10 ML: 5 INJECTION INTRAVENOUS at 08:30

## 2024-05-25 RX ADMIN — ACETAMINOPHEN 650 MG: 325 TABLET ORAL at 21:22

## 2024-05-25 RX ADMIN — BUDESONIDE AND FORMOTEROL FUMARATE DIHYDRATE 2 PUFF: 80; 4.5 AEROSOL RESPIRATORY (INHALATION) at 20:41

## 2024-05-25 ASSESSMENT — PAIN SCALES - GENERAL
PAINLEVEL_OUTOF10: 0
PAINLEVEL_OUTOF10: 3
PAINLEVEL_OUTOF10: 0
PAINLEVEL_OUTOF10: 0

## 2024-05-25 ASSESSMENT — PAIN DESCRIPTION - LOCATION: LOCATION: GENERALIZED

## 2024-05-25 ASSESSMENT — PAIN DESCRIPTION - DESCRIPTORS: DESCRIPTORS: ACHING

## 2024-05-25 ASSESSMENT — PAIN DESCRIPTION - ORIENTATION: ORIENTATION: MID;POSTERIOR

## 2024-05-25 ASSESSMENT — PAIN SCALES - WONG BAKER: WONGBAKER_NUMERICALRESPONSE: NO HURT

## 2024-05-25 NOTE — PROGRESS NOTES
Anesthesiology Evaluation and Intervention    05/24/24  Time: 11:30a    Mr. Conklin is a 63 yo M, morbidly obese with hx of LILA, COPD, Pulmonary HTN, Atrial Flutter, who underwent NADINE/Cardioversion today under moderate sedation.  He received 8 mg versed and 50 mcg fentanyl.  I was called with concerns for respiratory compromise and hypoxemia.      I arrived at the patient's bedside within 1 minute of the phone call for assistance.  He was saturating in the mid-to-upper 80% range and clearly obstructing with inspiration.  I placed an oropharyngeal airway which he tolerated well.  This improved his ventilation with improved chest rise, though with some mild obstruction persisting.  His oxygen saturations increased into the low 90s.  I watched him for approximately 10 minutes and told the nurses in the CV Prep area to call me with any worsening, and I went back to my anesthesia duties in the OR.      I returned approximately 1 hour later to find him awake and talking appropriately.      Vitals:    05/24/24 1315 05/24/24 1349 05/24/24 1606 05/24/24 1941   BP: 114/85 109/89 108/78 120/89   Pulse: 78 77 84 92   Resp: 18 18 18 16   Temp:  97.5 °F (36.4 °C) 98.1 °F (36.7 °C) 98.4 °F (36.9 °C)   TempSrc:  Oral Oral Oral   SpO2: 90% 93% 91% 90%   Weight:       Height:             Jimy Thomas MD

## 2024-05-25 NOTE — PROGRESS NOTES
5/25/2024 11:54 AM    Admit Date: 5/23/2024    Admit Diagnosis: Acute cor pulmonale (HCC) [I26.09]  Hypomagnesemia [E83.42]  Acute on chronic congestive heart failure, unspecified heart failure type (HCC) [I50.9]      Subjective:   No chest pain or shortness of breath.  Edema much improved.  Still some residual scrotal edema      Objective:    /77   Pulse 81   Temp 97.7 °F (36.5 °C) (Oral)   Resp 15   Ht 1.88 m (6' 2\")   Wt (!) 148.4 kg (327 lb 1.6 oz)   SpO2 97%   BMI 42.00 kg/m²     Physical Exam:  General-Well Developed, Well Nourished, No Acute Distress, Alert & Oriented x 3, appropriate mood.  Neck- supple, no JVD  CV- regular rate and rhythm no MRG  Lung- clear bilaterally  Abd- soft, nontender, nondistended  Ext- no edema bilaterally.  Skin- warm and dry        Data Review:   Recent Labs     05/25/24  0447      K 4.3   BUN 35*   WBC 11.5*   HGB 8.1*   HCT 30.7*   *       Assessment/Plan:     Active Hospital Problems    Pulmonary HTN (HCC) and acute cor pulmonale.  Good diuresis.  Still some scrotal edema however creatinine is now increasing.  Will hold Lasix with increasing creatinine and hopefully discharge tomorrow     acute cor pulmonale (HCC)      Atrial flutter (HCC)      Anemia      COPD (chronic obstructive pulmonary disease) (HCC)      Obesity      LILA (obstructive sleep apnea)

## 2024-05-25 NOTE — PROGRESS NOTES
Pt educated on the importance of accurately measuring output due to pt getting lasix drip. Pt states understanding of the need to measure output accurately; however, pt also states it is difficult for him to use urinal, bedside commode, and hat due to swollen scrotum and penis. Will measure output as occurrences.

## 2024-05-26 VITALS
SYSTOLIC BLOOD PRESSURE: 123 MMHG | RESPIRATION RATE: 16 BRPM | HEART RATE: 86 BPM | TEMPERATURE: 98.1 F | OXYGEN SATURATION: 92 % | HEIGHT: 74 IN | DIASTOLIC BLOOD PRESSURE: 92 MMHG | BODY MASS INDEX: 40.43 KG/M2 | WEIGHT: 315 LBS

## 2024-05-26 LAB
ANION GAP SERPL CALC-SCNC: 13 MMOL/L (ref 9–18)
BUN SERPL-MCNC: 39 MG/DL (ref 8–23)
CALCIUM SERPL-MCNC: 8.8 MG/DL (ref 8.8–10.2)
CHLORIDE SERPL-SCNC: 94 MMOL/L (ref 98–107)
CO2 SERPL-SCNC: 33 MMOL/L (ref 20–28)
CREAT SERPL-MCNC: 1.81 MG/DL (ref 0.8–1.3)
ERYTHROCYTE [DISTWIDTH] IN BLOOD BY AUTOMATED COUNT: 22.4 % (ref 11.9–14.6)
GLUCOSE SERPL-MCNC: 130 MG/DL (ref 70–99)
HCT VFR BLD AUTO: 30.1 % (ref 41.1–50.3)
HGB BLD-MCNC: 8 G/DL (ref 13.6–17.2)
MAGNESIUM SERPL-MCNC: 1.9 MG/DL (ref 1.8–2.4)
MCH RBC QN AUTO: 19.5 PG (ref 26.1–32.9)
MCHC RBC AUTO-ENTMCNC: 26.6 G/DL (ref 31.4–35)
MCV RBC AUTO: 73.2 FL (ref 82–102)
NRBC # BLD: 0 K/UL (ref 0–0.2)
PLATELET # BLD AUTO: 652 K/UL (ref 150–450)
PMV BLD AUTO: 8.9 FL (ref 9.4–12.3)
POTASSIUM SERPL-SCNC: 3.8 MMOL/L (ref 3.5–5.1)
RBC # BLD AUTO: 4.11 M/UL (ref 4.23–5.6)
SODIUM SERPL-SCNC: 139 MMOL/L (ref 136–145)
WBC # BLD AUTO: 11.2 K/UL (ref 4.3–11.1)

## 2024-05-26 PROCEDURE — 99238 HOSP IP/OBS DSCHRG MGMT 30/<: CPT | Performed by: INTERNAL MEDICINE

## 2024-05-26 PROCEDURE — 2700000000 HC OXYGEN THERAPY PER DAY

## 2024-05-26 PROCEDURE — 80048 BASIC METABOLIC PNL TOTAL CA: CPT

## 2024-05-26 PROCEDURE — 94761 N-INVAS EAR/PLS OXIMETRY MLT: CPT

## 2024-05-26 PROCEDURE — 94660 CPAP INITIATION&MGMT: CPT

## 2024-05-26 PROCEDURE — 94640 AIRWAY INHALATION TREATMENT: CPT

## 2024-05-26 PROCEDURE — 2580000003 HC RX 258: Performed by: NURSE PRACTITIONER

## 2024-05-26 PROCEDURE — 36415 COLL VENOUS BLD VENIPUNCTURE: CPT

## 2024-05-26 PROCEDURE — 83735 ASSAY OF MAGNESIUM: CPT

## 2024-05-26 PROCEDURE — 85027 COMPLETE CBC AUTOMATED: CPT

## 2024-05-26 PROCEDURE — 6370000000 HC RX 637 (ALT 250 FOR IP): Performed by: INTERNAL MEDICINE

## 2024-05-26 PROCEDURE — 6370000000 HC RX 637 (ALT 250 FOR IP): Performed by: NURSE PRACTITIONER

## 2024-05-26 RX ORDER — METOPROLOL SUCCINATE 50 MG/1
50 TABLET, EXTENDED RELEASE ORAL DAILY
Qty: 90 TABLET | Refills: 3 | Status: SHIPPED | OUTPATIENT
Start: 2024-05-26 | End: 2024-06-07 | Stop reason: SDUPTHER

## 2024-05-26 RX ORDER — TORSEMIDE 20 MG/1
40 TABLET ORAL DAILY
Status: DISCONTINUED | OUTPATIENT
Start: 2024-05-26 | End: 2024-05-26 | Stop reason: HOSPADM

## 2024-05-26 RX ADMIN — SODIUM CHLORIDE, PRESERVATIVE FREE 5 ML: 5 INJECTION INTRAVENOUS at 09:13

## 2024-05-26 RX ADMIN — TORSEMIDE 40 MG: 20 TABLET ORAL at 09:15

## 2024-05-26 RX ADMIN — APIXABAN 5 MG: 5 TABLET, FILM COATED ORAL at 09:12

## 2024-05-26 RX ADMIN — BUDESONIDE AND FORMOTEROL FUMARATE DIHYDRATE 2 PUFF: 80; 4.5 AEROSOL RESPIRATORY (INHALATION) at 07:16

## 2024-05-26 RX ADMIN — PANTOPRAZOLE SODIUM 40 MG: 40 TABLET, DELAYED RELEASE ORAL at 06:04

## 2024-05-26 RX ADMIN — METOPROLOL SUCCINATE 50 MG: 25 TABLET, FILM COATED, EXTENDED RELEASE ORAL at 09:12

## 2024-05-26 RX ADMIN — ASPIRIN 81 MG: 81 TABLET, COATED ORAL at 09:12

## 2024-05-26 RX ADMIN — TIOTROPIUM BROMIDE INHALATION SPRAY 2 PUFF: 3.12 SPRAY, METERED RESPIRATORY (INHALATION) at 07:16

## 2024-05-26 ASSESSMENT — PAIN SCALES - GENERAL: PAINLEVEL_OUTOF10: 0

## 2024-05-26 NOTE — CARE COORDINATION
Pt is for discharge home today with family and no needs/supportive care orders recieved for MSW at this time.  Per chart notes pt has a BiPap from [facility] and an "[employer] portable concentrator.     05/26/24 3088   Service Assessment   Patient's Healthcare Decision Maker is: Legal Next of Kin  (Daughter, Gricel)   Social/Functional History   Lives With Alone   Type of Home Apartment   Home Layout One level   Home Access Stairs to enter with rails   Receives Help From Family;Friend(s)   ADL Assistance Independent   Homemaking Assistance Independent   Ambulation Assistance Independent   Transfer Assistance Independent   Active  Yes   Mode of Transportation Car   Occupation Full time employment   Services At/After Discharge   Transition of Care Consult (CM Consult) Discharge Planning   Services At/After Discharge None   Stites Resource Information Provided? No   Mode of Transport at Discharge Other (see comment)  (car)   Confirm Follow Up Transport Family   Condition of Participation: Discharge Planning   The Plan for Transition of Care is related to the following treatment goals: Home with family assistance   The Patient and/or Patient Representative was provided with a Choice of Provider? Patient   The Patient and/Or Patient Representative agree with the Discharge Plan? Yes

## 2024-05-26 NOTE — PLAN OF CARE
Problem: Discharge Planning  Goal: Discharge to home or other facility with appropriate resources  5/26/2024 0945 by Tommy Shaw, RN  Outcome: Adequate for Discharge  5/26/2024 0945 by Tommy Shaw, RN  Outcome: Progressing     Problem: Safety - Adult  Goal: Free from fall injury  5/26/2024 0945 by Tommy Shaw, RN  Outcome: Adequate for Discharge  5/26/2024 0945 by Tommy Shaw, RN  Outcome: Progressing     Problem: Pain  Goal: Verbalizes/displays adequate comfort level or baseline comfort level  5/26/2024 0945 by Tommy Shaw, RN  Outcome: Adequate for Discharge  5/26/2024 0945 by Tommy Shaw, RN  Outcome: Progressing

## 2024-05-26 NOTE — DISCHARGE SUMMARY
[facility] Cardiology Discharge Summary     Patient ID:  Tay Conklin  [id number]  64 y.o.  1960    Admit date: 5/23/2024    Discharge date:  5/26/24    Admitting Physician: Gurjit Kaye MD     Discharge Physician: BRIELLE Valladares/Dr. Kaye    Admission Diagnoses: Acute cor pulmonale (HCC) [I26.09]  Hypomagnesemia [E83.42]  Acute on chronic congestive heart failure, unspecified heart failure type (HCC) [I50.9]    Discharge Diagnoses:   Patient Active Problem List    Diagnosis    Pulmonary HTN (HCC)    Acute cor pulmonale (HCC)    Atrial flutter (HCC)    Anemia    Gastritis    Diverticulosis    Chronic respiratory failure with hypercapnia (HCC)    Noncompliance with medication regimen    LILA (obstructive sleep apnea)    Polycythemia    COPD (chronic obstructive pulmonary disease) ([facility])    History of prior cigarette smoking    Obesity       Cardiology Procedures this admission:  NADINE/eCV  Consults: None    Hospital Course: Patient presented to the emergency department of [facility] with complaints of palpitations and shortness of breath. Noted to be in a flutter w volume overload.  The patient was noted to be in atrial fibrillation with RVR on arrival.  A Cardizem bolus was given in the ER with good response.  The patient was admitted to telemetry and Cardizem drip was continued.   Given a dose of IV lasix.     They were continued on eliquis for anticoagulation.     The patient was monitored on telemetry.       NADINE/Cardioversion was planned.  The patient underwent NADINE that was negative for thrombus and then underwent successful cardioversion from atrial fibrillation to sinus rhythm.  NADINE showed:      Left Ventricle: Normal left ventricular systolic function with a visually estimated EF of 55 - 60%. Left ventricle size is normal. Normal wall thickness. Septal flattening in diastole and systole consistent with right ventricular volume and pressure overload. Normal wall motion. Indeterminate  diastolic function due to arrhythmia.    Right Ventricle: Right ventricle is mildly dilated. Moderately reduced systolic function.    Mitral Valve: Mild regurgitation.    Tricuspid Valve: Moderate regurgitation.    Left Atrium: Left atrium is moderately dilated.  No evidence of thrombus within the left atrium.  No left atrial appendage thrombus noted. No left atrial appendage mass noted.    Interatrial Septum: Hypermobile interatrial septum. The septum is bowing into the LA.    Right Atrium: Right atrium is moderately dilated.    Image quality is excellent.    Appropriate for cardioversion attempt.  Successful cardioversion to sinus rhythm as below.    The patient felt much better back in sinus rhythm.  The morning of 5/26/24, the patient was feeling much better and remained in sinus rhythm.  The patient was seen and examined by Dr. Kaye and was determined stable and ready for discharge home.  The patient was instructed on the importance of medication compliance and outpatient follow up.  The patient will follow up with [facility] Cardiology Dr Broderick-we will call pt with appt. Check BMP in one week.     [facility] Cardiology office has been informed you need a follow up appointment after discharge. You will be called  with the follow up appointment. If you have NOT heard from our office after the next business day, please contact our office for appointment at [phone].      DISPOSITION: The patient is being discharged home in stable condition on a low saturated fat, low cholesterol and low salt diet. The patient is instructed to advance activities as tolerated to the limit of fatigue or shortness of breath. The patient is instructed to call the office or return to the ER for immediate evaluation for any severe shortness of breath, chest pain, prolonged palpitations, near syncope or syncope.    Discharge Exam: BP (!) 123/92   Pulse 86   Temp 98.1 °F (36.7 °C) (Oral)   Resp 16   Ht 1.88 m (6' 2\")   Wt (!) 148.9 kg

## 2024-05-26 NOTE — DISCHARGE INSTRUCTIONS
Atrial Flutter: Care Instructions  Overview     Atrial flutter is a type of heartbeat problem (arrhythmia) that usually causes a fast heart rate. In atrial flutter, a problem with the heart's electrical system causes the two upper parts of the heart (the right atrium and the left atrium) to flutter, or beat very fast. Atrial flutter is diagnosed using an electrocardiogram (EKG). An EKG is a test that checks for problems with the heart's electrical activity.  Treating atrial flutter is important for several reasons. The change in heartbeat can cause blood clots. The clots can travel from your heart to your brain and cause a stroke. A fast heartbeat can make you feel lightheaded, dizzy, and weak. And over time, it can also increase your risk for heart failure.  Atrial flutter is often the result of another heart condition, such as coronary artery disease or some other heart rhythm problems. A heart-healthy lifestyle can help you stay healthy and active.  Your doctor may prescribe medicines to help slow down your heartbeat. You may also take medicine to help prevent a stroke. In some cases, a procedure such as cardioversion or catheter ablation is done to stop atrial flutter.  Follow-up care is a key part of your treatment and safety. Be sure to make and go to all appointments, and call your doctor if you are having problems. It's also a good idea to know your test results and keep a list of the medicines you take.  How can you care for yourself at home?  Be safe with medicines.  Take your medicines exactly as prescribed. Call your doctor if you think you are having a problem with your medicine. You will get more details on the specific medicines your doctor prescribes.  If your doctor has given you a blood thinner to prevent a stroke, be sure you get instructions about how to take your medicine safely. Blood thinners can cause serious bleeding problems.  Do not take any vitamins, over-the-counter drugs, or herbal  products without talking to your doctor first.  Have a heart-healthy lifestyle.  Try to quit or cut back on using tobacco and other nicotine products. This includes smoking and vaping. Smoking can increase your chance of a stroke and heart attack. If you need help quitting, talk to your doctor about stop-smoking programs and medicines. These can increase your chances of quitting for good. Try to avoid secondhand smoke too.  Eat heart-healthy foods. These include vegetables, fruits, nuts, beans, lean meat, fish, and whole grains. Limit sodium and sugar.  Limit alcohol to 2 drinks a day for men and 1 drink a day for women. Too much alcohol can cause health problems.  Stay at a weight that's healthy for you. Talk to your doctor if you need help losing weight.  Try to get 7 to 9 hours of sleep each night.  Manage other health problems such as high blood pressure, high cholesterol, and diabetes. If you think you may have a problem with alcohol or drug use, talk to your doctor.  Avoid infections such as COVID-19, colds, and the flu. Get the flu vaccine every year. Get a pneumococcal vaccine shot. If you have had one before, ask your doctor whether you need another dose. Stay up to date on your COVID-19 vaccines.  Be active, but be safe.  Talk to your doctor about what type and level of exercise is safe for you. Try to be physically active for at least 30 minutes on most days of the week. For many people, walking is a good choice. You also may want to swim, bike, or do other activities.  When you exercise, watch for signs that your heart is working too hard. You are pushing too hard if you can't talk while you exercise. If you become short of breath or dizzy or have chest pain, sit down and rest right away.  When should you call for help?   Call 911 anytime you think you may need emergency care. For example, call if:    You have symptoms of a stroke. These may include:  Sudden numbness, tingling, weakness, or loss of

## 2024-05-30 NOTE — PROGRESS NOTES
Physician Progress Note      PATIENT:               GABRIELA FINK  CSN #:                  [id number]  :                       1960  ADMIT DATE:       2024 1:39 PM  DISCH DATE:        2024 10:10 AM  RESPONDING  PROVIDER #:        Gurjit Kaye MD          QUERY TEXT:    Pt admitted with acute cor pulmonale. Pt noted to also have Acute on chronic   congestive heart failure, unspecified heart failure type.  If possible, please   document in progress notes and discharge summary further specificity   regarding the type and acuity of CHF:    The medical record reflects the following:  Risk Factors: Tricuspid Valve: Moderate regurgitation, CHF  Clinical Indicators: H and P report 24 ?Ejection fraction is normal with   PA pressures of 75 mmHg?  CXR  Cardiomegaly with mild diffuse interstitial prominence stable?  PN  ?Acute cor pulmonale good diuresis.  He feels better today.  Continue   Lasix drip and will plan NADINE cardioversion for atrial flutter today.?  PN : Subjective: No chest pain or shortness of breath.  Edema much   improved.  Still some residual scrotal edema?  NADINE? Left Ventricle: Normal left ventricular systolic function with a visually   estimated EF of 55 - 60%. Right ventricle Moderately reduced systolic   function. Septal flattening in diastole and systole consistent with right   ventricular volume and pressure overload. Normal wall motion. Indeterminate   diastolic function due to arrhythmia.\"  NT pro-BNP 1291 H  Troponin T 29.0  Treatment: IV Furosemide, NADINE, serial labs,    Thank You,  Ivana Gutierrez S, CDS  Options provided:  -- Acute on Chronic Systolic CHF/HFrEF  -- Acute on Chronic Diastolic CHF/HFpEF  -- Acute on Chronic Systolic and Diastolic CHF  -- Other - I will add my own diagnosis  -- Disagree - Not applicable / Not valid  -- Disagree - Clinically unable to determine / Unknown  -- Refer to Clinical Documentation Reviewer    PROVIDER RESPONSE

## 2024-06-03 ENCOUNTER — TELEPHONE (OUTPATIENT)
Age: 64
End: 2024-06-03

## 2024-06-03 NOTE — TELEPHONE ENCOUNTER
Pt called in stating his water retention is getting extreme, his feet, ankle and genitals are extremely swollen. Pt wants to talk to a nurse as soon as possible.

## 2024-06-03 NOTE — TELEPHONE ENCOUNTER
Pt recently dc'd from [facility] 5/26/24. Dx acute cor pulmonale, acute on chronic CHF.    Pt c/o edema; distended abdomen and swollen scrotum and penis.  Taking torsemide 40mg daily.  Weight is down to 323 lbs, but edema is no better.  Pt adhering to low sodium diet and fluid restriction.    Encouraged pt to elevate scrotum while sitting or lying down. Scheduled appt for pt to see Dr. Broderick 6/7/24 at 1:15 at [facility]. Pt confirms appt date, time, and location.

## 2024-06-07 ENCOUNTER — OFFICE VISIT (OUTPATIENT)
Age: 64
End: 2024-06-07
Payer: COMMERCIAL

## 2024-06-07 VITALS
HEART RATE: 105 BPM | HEIGHT: 74 IN | BODY MASS INDEX: 40.43 KG/M2 | DIASTOLIC BLOOD PRESSURE: 82 MMHG | WEIGHT: 315 LBS | SYSTOLIC BLOOD PRESSURE: 124 MMHG

## 2024-06-07 DIAGNOSIS — I27.20 PULMONARY HTN (HCC): Chronic | ICD-10-CM

## 2024-06-07 DIAGNOSIS — I50.32 CHRONIC HEART FAILURE WITH PRESERVED EJECTION FRACTION (HFPEF) (HCC): Primary | Chronic | ICD-10-CM

## 2024-06-07 DIAGNOSIS — I48.3 TYPICAL ATRIAL FLUTTER (HCC): Chronic | ICD-10-CM

## 2024-06-07 PROCEDURE — 3017F COLORECTAL CA SCREEN DOC REV: CPT | Performed by: INTERNAL MEDICINE

## 2024-06-07 PROCEDURE — 1111F DSCHRG MED/CURRENT MED MERGE: CPT | Performed by: INTERNAL MEDICINE

## 2024-06-07 PROCEDURE — G8417 CALC BMI ABV UP PARAM F/U: HCPCS | Performed by: INTERNAL MEDICINE

## 2024-06-07 PROCEDURE — 1036F TOBACCO NON-USER: CPT | Performed by: INTERNAL MEDICINE

## 2024-06-07 PROCEDURE — G8427 DOCREV CUR MEDS BY ELIG CLIN: HCPCS | Performed by: INTERNAL MEDICINE

## 2024-06-07 PROCEDURE — 99214 OFFICE O/P EST MOD 30 MIN: CPT | Performed by: INTERNAL MEDICINE

## 2024-06-07 RX ORDER — METOPROLOL SUCCINATE 100 MG/1
100 TABLET, EXTENDED RELEASE ORAL DAILY
Qty: 30 TABLET | Refills: 11 | Status: SHIPPED | OUTPATIENT
Start: 2024-06-07

## 2024-06-07 RX ORDER — EPLERENONE 25 MG/1
25 TABLET, FILM COATED ORAL DAILY
Qty: 30 TABLET | Refills: 11 | Status: SHIPPED | OUTPATIENT
Start: 2024-06-07

## 2024-06-07 NOTE — PROGRESS NOTES
History reviewed. No pertinent family history.  Social History     Tobacco Use    Smoking status: Former     Current packs/day: 0.00     Average packs/day: 1.5 packs/day for 37.0 years (55.5 ttl pk-yrs)     Types: Cigarettes     Start date: 1977     Quit date: 2014     Years since quitting: 10.4    Smokeless tobacco: Never   Substance Use Topics    Alcohol use: Not Currently       ROS:    Review of Systems   Cardiovascular:  Positive for dyspnea on exertion. Negative for chest pain, palpitations and syncope.   Neurological:  Positive for light-headedness.          PHYSICAL EXAM:   /82   Pulse (!) 105   Ht 1.88 m (6' 2\")   Wt (!) 148.2 kg (326 lb 12.8 oz)   BMI 41.96 kg/m²      Wt Readings from Last 3 Encounters:   06/07/24 (!) 148.2 kg (326 lb 12.8 oz)   05/26/24 (!) 148.9 kg (328 lb 4.2 oz)   05/20/24 (!) 154.7 kg (341 lb)     BP Readings from Last 3 Encounters:   06/07/24 124/82   05/26/24 (!) 123/92   05/20/24 (!) 124/90     Pulse Readings from Last 3 Encounters:   06/07/24 (!) 105   05/26/24 86   05/20/24 (!) 119           Physical Exam  Constitutional:       General: He is not in acute distress.     Appearance: Normal appearance.   HENT:      Head: Normocephalic and atraumatic.      Mouth/Throat:      Mouth: Mucous membranes are moist.   Eyes:      Extraocular Movements: Extraocular movements intact.   Neck:      Vascular: No carotid bruit.   Cardiovascular:      Rate and Rhythm: Regular rhythm. Tachycardia present.      Pulses: Normal pulses.      Heart sounds: No murmur heard.     No friction rub. No gallop.   Pulmonary:      Effort: No respiratory distress.      Breath sounds: Rales (light crackles) present. No wheezing or rhonchi.   Abdominal:      General: There is no distension.   Musculoskeletal:         General: Swelling (1+ ankle edema BL) present. No deformity (1+ edema to mid shin BL).   Skin:     General: Skin is warm.      Coloration: Skin is not jaundiced.   Neurological:      Mental

## 2024-06-10 RX ORDER — TORSEMIDE 20 MG/1
40 TABLET ORAL DAILY
Qty: 180 TABLET | Refills: 1 | Status: SHIPPED | OUTPATIENT
Start: 2024-06-10 | End: 2024-06-14

## 2024-06-10 NOTE — PROGRESS NOTES
Physician Progress Note      PATIENT:               GABRIELA FINK  CSN #:                  [id number]  :                       1960  ADMIT DATE:       2024 1:39 PM  DISCH DATE:        2024 10:10 AM  RESPONDING  PROVIDER #:        Gurjit Gamez MD          QUERY TEXT:    Patient admitted with acute cor pulmonale Documentation reflects Acute on   chronic congestive heart failure, unspecified heart failure type in discharge   summery dated 24.  If possible, please document in the progress notes and   discharge summary if Acute on chronic congestive heart failure was:    The medical record reflects the following:  Risk Factors: Tricuspid Valve: Moderate regurgitation, CHF  Clinical Indicators: DS  Acute on chronic congestive heart failure,   unspecified heart failure type under admission diagnosis.  H and P report 24 ?Ejection fraction is normal with PA pressures of 75   mmHg?  ED  History of CHF has been doubling up on his Lasix with no improvement.  CXR  Cardiomegaly with mild diffuse interstitial prominence stable?  PN  ?Acute cor pulmonale good diuresis.  He feels better today.  Continue   Lasix drip and will plan NADINE cardioversion for atrial flutter today.?  PN : Subjective: No chest pain or shortness of breath.  Edema much   improved.  Still some residual scrotal edema?  NADINE Right ventricle Moderately reduced systolic function. Left Ventricle:   Normal left ventricular systolic function with a visually estimated EF of 55 -   60%. Septal flattening in diastole and systole consistent with right   ventricular volume and pressure overload. Normal wall motion. Indeterminate   diastolic function due to arrhythmia.\"  NT pro-BNP 1291 H  Troponin T 29.0  Treatment: IV Furosemide, NADINE, serial labs,    Thank You,  Ivana Gutierrez S, CDS  Options provided:  -- Acute on chronic systolic heart failure, confirmed after study  -- Acute systolic ruled out and

## 2024-06-10 NOTE — TELEPHONE ENCOUNTER
Requested Prescriptions     Pending Prescriptions Disp Refills    torsemide (DEMADEX) 20 MG tablet [Pharmacy Med Name: TORSEMIDE 20 MG TABLET] 180 tablet 1     Sig: TAKE 2 TABLETS BY MOUTH DAILY

## 2024-06-14 ENCOUNTER — TELEPHONE (OUTPATIENT)
Age: 64
End: 2024-06-14

## 2024-06-14 DIAGNOSIS — I50.32 CHRONIC HEART FAILURE WITH PRESERVED EJECTION FRACTION (HFPEF) (HCC): Chronic | ICD-10-CM

## 2024-06-14 NOTE — TELEPHONE ENCOUNTER
Advised patient of Dr. Broderick's response. Advised patient to call with any further questions or concerns prior to appointment. Patient verbalized understanding.

## 2024-06-14 NOTE — TELEPHONE ENCOUNTER
When he started taking Jardiance and eplerenone after 6/7/24 office visit with Dr. Broderick, he felt very sick with increased dizziness, SOB, diarrhea, and nausea.  When he took Jardiance, without eplerenone, for a few days, he felt very dizzy and SOB.   When he took eplerenone, without Jardiance, for a few days, he had diarrhea and bad nausea.   Taking Eliquis 5 mg BID, ASA 81 mg qd, Toprol  mg qd, and torsemide 40 mg qd.     Patient asks for Dr. Broderick's recommendations. He asks if he may D/C Jardiance and eplerenone and try other meds.

## 2024-06-14 NOTE — TELEPHONE ENCOUNTER
Jeremy Broderick, Lina Aquino RN  Caller: Unspecified (Today,  1:50 PM)  Okay to stop taking these medications for now. Since he does not tolerate these well, I am going to focus on removing any extra fluid on him and on controlling his blood pressure instead of prescribing anything else right now. I will check back in with him on all of this at his next follow up. Thank you.

## 2024-06-14 NOTE — TELEPHONE ENCOUNTER
Pt was percribed the following two meds:     1) empagliflozin (JARDIANCE) 10 MG tablet - dizzy and SOB    2) eplerenone (INSPRA) 25 MG tablet  diarrhea and nausea    Started taking it on 6/8 and was sicker than a dog. SOB diahreah headache, dizzy and stopped taking.  Then took one at a time and symptoms are outlined above.    Not sure what to do at this point.

## 2024-07-05 ENCOUNTER — OFFICE VISIT (OUTPATIENT)
Age: 64
End: 2024-07-05
Payer: COMMERCIAL

## 2024-07-05 VITALS
HEIGHT: 74 IN | BODY MASS INDEX: 40.43 KG/M2 | DIASTOLIC BLOOD PRESSURE: 80 MMHG | SYSTOLIC BLOOD PRESSURE: 112 MMHG | HEART RATE: 106 BPM | WEIGHT: 315 LBS

## 2024-07-05 DIAGNOSIS — I27.20 PULMONARY HTN (HCC): Chronic | ICD-10-CM

## 2024-07-05 DIAGNOSIS — I48.92 ATRIAL FLUTTER, UNSPECIFIED TYPE (HCC): Chronic | ICD-10-CM

## 2024-07-05 DIAGNOSIS — I50.32 CHRONIC HEART FAILURE WITH PRESERVED EJECTION FRACTION (HFPEF) (HCC): Primary | Chronic | ICD-10-CM

## 2024-07-05 PROCEDURE — 3017F COLORECTAL CA SCREEN DOC REV: CPT | Performed by: INTERNAL MEDICINE

## 2024-07-05 PROCEDURE — 1036F TOBACCO NON-USER: CPT | Performed by: INTERNAL MEDICINE

## 2024-07-05 PROCEDURE — G8417 CALC BMI ABV UP PARAM F/U: HCPCS | Performed by: INTERNAL MEDICINE

## 2024-07-05 PROCEDURE — G8427 DOCREV CUR MEDS BY ELIG CLIN: HCPCS | Performed by: INTERNAL MEDICINE

## 2024-07-05 PROCEDURE — 99214 OFFICE O/P EST MOD 30 MIN: CPT | Performed by: INTERNAL MEDICINE

## 2024-07-05 RX ORDER — SPIRONOLACTONE 25 MG/1
12.5 TABLET ORAL DAILY
Qty: 15 TABLET | Refills: 11 | Status: SHIPPED | OUTPATIENT
Start: 2024-07-05

## 2024-07-05 RX ORDER — DAPAGLIFLOZIN 10 MG/1
10 TABLET, FILM COATED ORAL EVERY MORNING
Qty: 30 TABLET | Refills: 11 | Status: SHIPPED | OUTPATIENT
Start: 2024-07-05

## 2024-07-05 NOTE — PROGRESS NOTES
[facility] CARDIOLOGY  [address] [place] [address], [address]  [place] [address]  PHONE: [phone]      24    NAME:  Tay Conklin  : 1960  MRN: [id number]         SUBJECTIVE:   Tay Conklin is a 64 y.o. male seen for a follow up visit regarding the following:     Chief Complaint   Patient presents with    Chronic heart failure    Leg Swelling            HPI:  Follow up  Chronic heart failure and Leg Swelling   .      64 y.o. male with PMH COPD on home O2, LILA, chronic HFpEF presenting for routine follow up. Since his last visit he has lost weight and his swelling has improved. He feels better than he has in a while but still reports dyspnea. No chest pain, syncope or palpitations.        Cardiac Medications       Potassium Sparing Diuretics       spironolactone (ALDACTONE) 25 MG tablet Take 0.5 tablets by mouth daily       Beta Blockers Cardio-Selective       metoprolol succinate (TOPROL XL) 100 MG extended release tablet Take 1 tablet by mouth daily       Loop Diuretics       torsemide 40 MG TABS Take 40 mg by mouth daily     Patient taking differently: Take 80 mg by mouth daily       Salicylates       aspirin 81 MG EC tablet Take 1 tablet by mouth daily       Direct Factor Xa Inhibitors       apixaban (ELIQUIS) 5 MG TABS tablet Take 1 tablet by mouth 2 times daily          Diabetic Medications       Sodium-Glucose Co-Transporter 2 (SGLT2) Inhibitors       dapagliflozin (FARXIGA) 10 MG tablet Take 1 tablet by mouth every morning                Past Medical History, Past Surgical History, Family history, Social History, and Medications were all reviewed with the patient today and updated as necessary.     Prior to Admission medications    Medication Sig Start Date End Date Taking? Authorizing Provider   spironolactone (ALDACTONE) 25 MG tablet Take 0.5 tablets by mouth daily 24  Yes Jeremy Broderick,    dapagliflozin (FARXIGA) 10 MG tablet Take 1 tablet by mouth every morning 24

## 2024-08-15 ENCOUNTER — TELEPHONE (OUTPATIENT)
Age: 64
End: 2024-08-15

## 2024-08-15 NOTE — TELEPHONE ENCOUNTER
Patient called stating he has the following issues :    Takes apixaban (ELIQUIS) 5 MG TABS tablet   Would like an effective less expensive alternative if possible  Would consider [facility] as well    Please call and advise.

## 2024-08-15 NOTE — TELEPHONE ENCOUNTER
Spoke with pt regarding medication Eliquis. Informed of physician's response. Pt wanted to proceed with [facility]. Provided instructions to pt. Pt voiced understanding.

## 2024-08-20 ENCOUNTER — TELEPHONE (OUTPATIENT)
Age: 64
End: 2024-08-20

## 2024-08-20 NOTE — TELEPHONE ENCOUNTER
MEDICATION REFILL REQUEST      Name of Medication:  Eliquis  Dose:  5 mg  Frequency:  BID  Quantity:  90  Days' supply:  90  Pharmacy Name/Location:  [facility]-[phone]  Order#[id number]

## 2024-11-04 ENCOUNTER — TELEPHONE (OUTPATIENT)
Age: 64
End: 2024-11-04
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
## 2024-11-04 NOTE — TELEPHONE ENCOUNTER
Spoke with pt regarding new medications sent in. Informed pt of physician's response. Pt voiced understanding.

## 2024-11-11 ENCOUNTER — OFFICE VISIT (OUTPATIENT)
Age: 64
End: 2024-11-11
Payer: COMMERCIAL

## 2024-11-11 VITALS
BODY MASS INDEX: 36.06 KG/M2 | DIASTOLIC BLOOD PRESSURE: 80 MMHG | HEART RATE: 63 BPM | WEIGHT: 281 LBS | SYSTOLIC BLOOD PRESSURE: 118 MMHG | HEIGHT: 74 IN

## 2024-11-11 DIAGNOSIS — I48.92 ATRIAL FLUTTER, UNSPECIFIED TYPE (HCC): Chronic | ICD-10-CM

## 2024-11-11 DIAGNOSIS — I50.32 CHRONIC HEART FAILURE WITH PRESERVED EJECTION FRACTION (HFPEF) (HCC): Primary | Chronic | ICD-10-CM

## 2024-11-11 PROCEDURE — 99214 OFFICE O/P EST MOD 30 MIN: CPT | Performed by: INTERNAL MEDICINE

## 2024-11-11 RX ORDER — MAGNESIUM OXIDE 400 MG/1
400 TABLET ORAL 2 TIMES DAILY
COMMUNITY

## 2024-11-11 RX ORDER — DILTIAZEM HYDROCHLORIDE 120 MG/1
120 CAPSULE, EXTENDED RELEASE ORAL EVERY MORNING
COMMUNITY

## 2024-11-11 RX ORDER — OXYCODONE AND ACETAMINOPHEN 5; 325 MG/1; MG/1
1 TABLET ORAL EVERY 4 HOURS PRN
COMMUNITY
Start: 2024-10-31

## 2024-11-11 RX ORDER — FERROUS SULFATE 325(65) MG
325 TABLET ORAL 2 TIMES DAILY
COMMUNITY

## 2024-11-11 RX ORDER — FUROSEMIDE 40 MG/1
40 TABLET ORAL 2 TIMES DAILY
COMMUNITY
Start: 2024-10-31 | End: 2024-11-30

## 2024-11-11 RX ORDER — AMIODARONE HYDROCHLORIDE 100 MG/1
100 TABLET ORAL EVERY 12 HOURS SCHEDULED
COMMUNITY
Start: 2024-10-31 | End: 2024-11-30
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
## 2024-11-26 ENCOUNTER — TELEPHONE (OUTPATIENT)
Dept: PULMONOLOGY | Age: 64
End: 2024-11-26

## 2024-11-26 NOTE — TELEPHONE ENCOUNTER
Needs order for 02 concentrator for his home that he can bleed thru to his cpap.  [facility]

## 2024-11-26 NOTE — TELEPHONE ENCOUNTER
Order for stationary concentrator has been sent to [facility] via [employer].  Patient is due for an appointment with Dr. Boyer.  I tried calling the patient to see if I can get him scheduled but was not successful in reaching him and left him a message.    Dona/Karissa: Can we try to give this patient a call to see if we can get him scheduled? He also needs to have CPFT's done as well.  Thank you so much! // Candy CASTRO

## 2024-12-12 RX ORDER — DILTIAZEM HYDROCHLORIDE 120 MG/1
120 CAPSULE, EXTENDED RELEASE ORAL EVERY MORNING
Qty: 90 CAPSULE | Refills: 3 | Status: SHIPPED | OUTPATIENT
Start: 2024-12-12

## 2024-12-12 NOTE — TELEPHONE ENCOUNTER
MEDICATION REFILL REQUEST      Name of Medication:  diltiazem   Dose:  120 mg  Frequency:    Quantity:    Days' supply:        Pharmacy Name/Location:  [facility]

## 2024-12-12 NOTE — TELEPHONE ENCOUNTER
Requested Prescriptions     Pending Prescriptions Disp Refills    dilTIAZem (DILACOR XR) 120 MG extended release capsule 90 capsule 3     Sig: Take 1 capsule by mouth every morning

## 2024-12-17 ENCOUNTER — OFFICE VISIT (OUTPATIENT)
Dept: PULMONOLOGY | Age: 64
End: 2024-12-17

## 2024-12-17 VITALS
TEMPERATURE: 98 F | RESPIRATION RATE: 20 BRPM | SYSTOLIC BLOOD PRESSURE: 110 MMHG | HEIGHT: 74 IN | WEIGHT: 315 LBS | BODY MASS INDEX: 40.43 KG/M2 | HEART RATE: 74 BPM | DIASTOLIC BLOOD PRESSURE: 60 MMHG | OXYGEN SATURATION: 81 %

## 2024-12-17 DIAGNOSIS — G47.33 OSA (OBSTRUCTIVE SLEEP APNEA): ICD-10-CM

## 2024-12-17 DIAGNOSIS — Z87.891 HISTORY OF PRIOR CIGARETTE SMOKING: ICD-10-CM

## 2024-12-17 DIAGNOSIS — L03.113 CELLULITIS OF ARM, RIGHT: ICD-10-CM

## 2024-12-17 DIAGNOSIS — J44.9 CHRONIC OBSTRUCTIVE PULMONARY DISEASE, UNSPECIFIED COPD TYPE (HCC): Primary | ICD-10-CM

## 2024-12-17 DIAGNOSIS — I50.32 CHRONIC HEART FAILURE WITH PRESERVED EJECTION FRACTION (HFPEF) (HCC): ICD-10-CM

## 2024-12-17 DIAGNOSIS — J96.12 CHRONIC RESPIRATORY FAILURE WITH HYPERCAPNIA: ICD-10-CM

## 2024-12-17 PROCEDURE — G2211 COMPLEX E/M VISIT ADD ON: HCPCS | Performed by: INTERNAL MEDICINE

## 2024-12-17 PROCEDURE — 99214 OFFICE O/P EST MOD 30 MIN: CPT | Performed by: INTERNAL MEDICINE

## 2024-12-17 RX ORDER — MUPIROCIN 20 MG/G
OINTMENT TOPICAL
Qty: 2 EACH | Refills: 0 | Status: SHIPPED | OUTPATIENT
Start: 2024-12-17 | End: 2024-12-24

## 2024-12-17 RX ORDER — FLUTICASONE PROPIONATE AND SALMETEROL 232; 14 UG/1; UG/1
1 POWDER, METERED RESPIRATORY (INHALATION) 2 TIMES DAILY
Qty: 1 EACH | Refills: 11 | Status: SHIPPED | OUTPATIENT
Start: 2024-12-17

## 2024-12-17 NOTE — PROGRESS NOTES
within the left atrium.  No left atrial appendage thrombus noted. No left atrial appendage mass noted.    Interatrial Septum: Hypermobile interatrial septum. The septum is bowing into the LA.    Right Atrium: Right atrium is moderately dilated.    Image quality is excellent.    Appropriate for cardioversion attempt.  Successful cardioversion to sinus rhythm as below.    [facility] Reference Info:                                                                                                                Immunization History   Administered Date(s) Administered    Influenza, FLUARIX, FLULAVAL, FLUZONE (age 6 mo+) and AFLURIA, (age 3 y+), Quadv PF, 0.5mL 11/05/2022    Influenza, FLUCELVAX, (age 6 mo+), MDCK, Quadv PF, 0.5mL 10/04/2023     Past Medical History:   Diagnosis Date    COPD (chronic obstructive pulmonary disease) ([facility])         Tobacco Use      Smoking status: Former        Packs/day: 0.00        Years: 1.5 packs/day for 37.0 years (55.5 ttl pk-yrs)        Types: Cigarettes        Start date: 1977        Quit date: 2014        Years since quitting: 10.9      Smokeless tobacco: Never    No Known Allergies  Current Outpatient Medications   Medication Instructions    amiodarone (PACERONE) 100 mg, EVERY 12 HOURS SCHEDULED    apixaban (ELIQUIS) 5 mg, Oral, 2 TIMES DAILY    aspirin 81 mg, Oral, DAILY    dapagliflozin (FARXIGA) 10 mg, Oral, EVERY MORNING    dilTIAZem (DILACOR XR) 120 mg, Oral, EVERY MORNING    ferrous sulfate (IRON 325) 325 mg, Oral, 2 TIMES DAILY    Fexofenadine HCl (ALLEGRA PO) 10 mg, EVERY MORNING    fluticasone-salmeterol (ADVAIR) 100-50 MCG/ACT AEPB diskus inhaler 1 puff, Inhalation, EVERY 12 HOURS    Fluticasone-Salmeterol 232-14 MCG/ACT AEPB 1 puff, Inhalation, 2 times daily    MAGNESIUM GLYCINATE PO 80 mg, NIGHTLY    magnesium oxide (MAG-OX) 400 mg, Oral, 2 TIMES DAILY    metoprolol succinate (TOPROL XL) 100 mg, Oral, DAILY    Multiple Vitamin (ONE-DAILY MULTI-VITAMIN PO) 1 Dose, DAILY

## 2024-12-23 ENCOUNTER — TELEPHONE (OUTPATIENT)
Dept: PULMONOLOGY | Age: 64
End: 2024-12-23

## 2024-12-23 NOTE — TELEPHONE ENCOUNTER
his supplier has requested that you add physician chart notes to this order!  Reason:  WE WILL NEED QUALIFYING SATS IN ORDER TO FILE WITH [employer] FOR THIS PATIENT. SATS IN NOTES ARE FROM MARCH 1, 2024 AND OVER 30 DAYS OLD. IF PATIENT ONLY NEEDS AT NIGHT ON BIPAP WE MAY NEED AN ORDER TO DO AN OVERNIGHT OXIMETRY ON ROOM AIR & BIPAP.    Received a phone call from the pt in reference to the above. He's never been seen in sleep.

## 2025-01-08 ENCOUNTER — INITIAL CONSULT (OUTPATIENT)
Age: 65
End: 2025-01-08
Payer: COMMERCIAL

## 2025-01-08 VITALS
SYSTOLIC BLOOD PRESSURE: 112 MMHG | HEART RATE: 128 BPM | DIASTOLIC BLOOD PRESSURE: 80 MMHG | BODY MASS INDEX: 40.43 KG/M2 | HEIGHT: 74 IN | WEIGHT: 315 LBS

## 2025-01-08 DIAGNOSIS — I48.19 PERSISTENT ATRIAL FIBRILLATION (HCC): ICD-10-CM

## 2025-01-08 DIAGNOSIS — I50.32 CHRONIC HEART FAILURE WITH PRESERVED EJECTION FRACTION (HFPEF) (HCC): ICD-10-CM

## 2025-01-08 DIAGNOSIS — I48.92 ATRIAL FLUTTER, UNSPECIFIED TYPE (HCC): Primary | ICD-10-CM

## 2025-01-08 PROCEDURE — 99204 OFFICE O/P NEW MOD 45 MIN: CPT | Performed by: INTERNAL MEDICINE

## 2025-01-08 PROCEDURE — 93000 ELECTROCARDIOGRAM COMPLETE: CPT | Performed by: INTERNAL MEDICINE

## 2025-01-08 RX ORDER — BUDESONIDE AND FORMOTEROL FUMARATE DIHYDRATE 160; 4.5 UG/1; UG/1
2 AEROSOL RESPIRATORY (INHALATION)
COMMUNITY
Start: 2024-07-24

## 2025-01-08 RX ORDER — FUROSEMIDE 40 MG/1
TABLET ORAL
COMMUNITY
Start: 2025-01-02

## 2025-01-08 RX ORDER — TAMSULOSIN HYDROCHLORIDE 0.4 MG/1
CAPSULE ORAL
COMMUNITY
Start: 2024-10-31

## 2025-01-08 NOTE — PROGRESS NOTES
147 kg (324 lb)   11/11/24 127.5 kg (281 lb)     BP Readings from Last 3 Encounters:   01/08/25 112/80   12/17/24 110/60   11/11/24 118/80       Gen: well appearing, well developed, NAD  Eyes: Pupils equal, EOMI  CV: RRR, no M/R/G, normal JVD, normal distal pulses, no KIANA  Pulm: CTAB, no accessory muscle uses, no wheezes, crackles  GI: soft, NT, ND  Neuro: Alert and oriented    Medical problems and test results were reviewed with the patient today.     No results found for any visits on 01/08/25.    EKG:  (EKG has been independently visualized by me with interpretation below)  Atrial fibrillation, rate 128   -Nonspecific T-abnormality.  Low voltage -possible pulmonary disease.    Tay was seen today for consultation, atrial flutter and atrial fibrillation.    Diagnoses and all orders for this visit:    Atrial flutter, unspecified type (HCC)  -     EKG 12 lead  -     Case Request EP Lab  -     Basic Metabolic Panel; Future  -     CBC; Future  -     Magnesium; Future    Chronic heart failure with preserved ejection fraction (HFpEF) (HCC)  -     EKG 12 lead    Persistent atrial fibrillation (HCC)  -     Case Request EP Lab  -     Basic Metabolic Panel; Future  -     CBC; Future  -     Magnesium; Future      ASSESSMENT and PLAN  1. Persistent atrial fibrillation failing amiodarone: I had a discussion with the Pt today regarding rate and rhythm control strategies, rhythm control strategy treatment options including DCCV, antiarrhythmic therapy, catheter ablation and the combination of the above. I discussed at length the advantages and disadvantages of all treatment strategies.  We discussed the option of cardiac ablation in detail, including discussion of some of the more common, however, very low risks of the procedure including access complications and risks of damage to the heart or surrounding structures.  We discussed the very low risk of esophageal injury which could result in a serious complication. We discussed

## 2025-01-15 DIAGNOSIS — I48.3 TYPICAL ATRIAL FLUTTER (HCC): Chronic | ICD-10-CM

## 2025-01-15 NOTE — TELEPHONE ENCOUNTER
MEDICATION REFILL REQUEST      Name of Medication:  Apixaban  Dose:  5 mg  Frequency:  BID  Quantity:  180  Days' supply:  90 with 3 refills      Pharmacy Name/Location:  [id number]_

## 2025-01-16 NOTE — TELEPHONE ENCOUNTER
Prescription sent to [facility]  Requested Prescriptions     Signed Prescriptions Disp Refills    apixaban (ELIQUIS) 5 MG TABS tablet 180 tablet 3     Sig: Take 1 tablet by mouth 2 times daily     Authorizing Provider: AR LONG     Ordering User: CHINEDU MKCAY

## 2025-01-16 NOTE — TELEPHONE ENCOUNTER
Requested Prescriptions     Pending Prescriptions Disp Refills    apixaban (ELIQUIS) 5 MG TABS tablet 180 tablet 3     Sig: Take 1 tablet by mouth 2 times daily

## 2025-01-17 DIAGNOSIS — I50.32 CHRONIC HEART FAILURE WITH PRESERVED EJECTION FRACTION (HFPEF) (HCC): Primary | ICD-10-CM

## 2025-01-17 RX ORDER — FUROSEMIDE 40 MG/1
40 TABLET ORAL 2 TIMES DAILY
Qty: 120 TABLET | Refills: 11 | Status: SHIPPED | OUTPATIENT
Start: 2025-01-17

## 2025-01-21 RX ORDER — AMIODARONE HYDROCHLORIDE 100 MG/1
100 TABLET ORAL EVERY 12 HOURS SCHEDULED
Qty: 30 TABLET | Refills: 3 | Status: SHIPPED | OUTPATIENT
Start: 2025-01-21

## 2025-01-21 NOTE — TELEPHONE ENCOUNTER
Requested Prescriptions     Pending Prescriptions Disp Refills    amiodarone (PACERONE) 100 MG tablet 30 tablet 3     Sig: Take 1 tablet by mouth every 12 hours        Called pt to clarify if he's taking the Amiodarone due to medication not being on last office note. Pt states he is still taking the Amiodarone until his Surgery on 02/21/205. Pt requested 60 day supply.

## 2025-01-21 NOTE — TELEPHONE ENCOUNTER
MEDICATION REFILL REQUEST      Name of Medication:  Amiodarone  Dose:  100 mg  Frequency:  Every 12 hours  Quantity:    Days' supply:  90 day       [facility]

## 2025-02-03 ENCOUNTER — TELEPHONE (OUTPATIENT)
Age: 65
End: 2025-02-03

## 2025-02-03 NOTE — TELEPHONE ENCOUNTER
Spoke with pt and informed of physician's response regarding Lasix   MyChart message. Pt voiced understanding.

## 2025-02-14 ENCOUNTER — TELEPHONE (OUTPATIENT)
Age: 65
End: 2025-02-14

## 2025-02-14 DIAGNOSIS — I48.92 ATRIAL FLUTTER, UNSPECIFIED TYPE (HCC): ICD-10-CM

## 2025-02-14 DIAGNOSIS — I48.19 PERSISTENT ATRIAL FIBRILLATION (HCC): ICD-10-CM

## 2025-02-14 LAB
ANION GAP SERPL CALC-SCNC: 12 MMOL/L (ref 7–16)
BUN SERPL-MCNC: 48 MG/DL (ref 8–23)
CALCIUM SERPL-MCNC: 9.4 MG/DL (ref 8.8–10.2)
CHLORIDE SERPL-SCNC: 92 MMOL/L (ref 98–107)
CO2 SERPL-SCNC: 30 MMOL/L (ref 20–29)
CREAT SERPL-MCNC: 1.99 MG/DL (ref 0.8–1.3)
ERYTHROCYTE [DISTWIDTH] IN BLOOD BY AUTOMATED COUNT: 21.7 % (ref 11.9–14.6)
GLUCOSE SERPL-MCNC: 96 MG/DL (ref 70–99)
HCT VFR BLD AUTO: 46.8 % (ref 41.1–50.3)
HGB BLD-MCNC: 14.2 G/DL (ref 13.6–17.2)
MAGNESIUM SERPL-MCNC: 2.6 MG/DL (ref 1.8–2.4)
MCH RBC QN AUTO: 26.8 PG (ref 26.1–32.9)
MCHC RBC AUTO-ENTMCNC: 30.3 G/DL (ref 31.4–35)
MCV RBC AUTO: 88.5 FL (ref 82–102)
NRBC # BLD: 0 K/UL (ref 0–0.2)
PLATELET # BLD AUTO: 599 K/UL (ref 150–450)
PMV BLD AUTO: 9.3 FL (ref 9.4–12.3)
POTASSIUM SERPL-SCNC: 5.2 MMOL/L (ref 3.5–5.1)
RBC # BLD AUTO: 5.29 M/UL (ref 4.23–5.6)
SODIUM SERPL-SCNC: 135 MMOL/L (ref 136–145)
WBC # BLD AUTO: 15.9 K/UL (ref 4.3–11.1)

## 2025-02-14 NOTE — TELEPHONE ENCOUNTER
Patient reports he has edema of his abdomen and groin. Reports he has gained 5 lbs in the last several days. No chest pain or SOB noted. Reports he has been avoiding sodium. Taking Lasix 40mg BID. Will address with MD for recommendations.

## 2025-02-14 NOTE — TELEPHONE ENCOUNTER
Marquis Vasquez MD  You11 minutes ago (10:05 AM)       Looks like Dr. Broderick is primary cardiologist. I have only seen Pt one time in consultation. Do you know when he is scheduled for ablation? Probably needs to come on in for the pre procedure lab work

## 2025-02-14 NOTE — TELEPHONE ENCOUNTER
Patient instructed to come in to get pre-procedure labs per Dr. Vasquez. Patient verbalized understanding and will get labs done today.

## 2025-02-17 ENCOUNTER — HOSPITAL ENCOUNTER (INPATIENT)
Age: 65
LOS: 13 days | Discharge: HOME | DRG: 871 | End: 2025-03-02
Attending: EMERGENCY MEDICINE | Admitting: INTERNAL MEDICINE
Payer: COMMERCIAL

## 2025-02-17 ENCOUNTER — TELEPHONE (OUTPATIENT)
Age: 65
End: 2025-02-17

## 2025-02-17 ENCOUNTER — APPOINTMENT (OUTPATIENT)
Dept: NON INVASIVE DIAGNOSTICS | Age: 65
DRG: 871 | End: 2025-02-17
Payer: COMMERCIAL

## 2025-02-17 ENCOUNTER — APPOINTMENT (OUTPATIENT)
Dept: GENERAL RADIOLOGY | Age: 65
DRG: 871 | End: 2025-02-17
Payer: COMMERCIAL

## 2025-02-17 ENCOUNTER — APPOINTMENT (OUTPATIENT)
Dept: CT IMAGING | Age: 65
DRG: 871 | End: 2025-02-17
Payer: COMMERCIAL

## 2025-02-17 DIAGNOSIS — J96.21 ACUTE ON CHRONIC RESPIRATORY FAILURE WITH HYPOXIA AND HYPERCAPNIA (HCC): Primary | ICD-10-CM

## 2025-02-17 DIAGNOSIS — N48.89 PENILE EDEMA: ICD-10-CM

## 2025-02-17 DIAGNOSIS — K74.60 CIRRHOSIS OF LIVER WITH ASCITES, UNSPECIFIED HEPATIC CIRRHOSIS TYPE (HCC): ICD-10-CM

## 2025-02-17 DIAGNOSIS — I50.32 CHRONIC HEART FAILURE WITH PRESERVED EJECTION FRACTION (HFPEF) (HCC): Chronic | ICD-10-CM

## 2025-02-17 DIAGNOSIS — J96.12 CHRONIC RESPIRATORY FAILURE WITH HYPOXIA AND HYPERCAPNIA (HCC): Chronic | ICD-10-CM

## 2025-02-17 DIAGNOSIS — K70.31 ASCITES DUE TO ALCOHOLIC CIRRHOSIS (HCC): ICD-10-CM

## 2025-02-17 DIAGNOSIS — E87.70 HYPERVOLEMIA, UNSPECIFIED HYPERVOLEMIA TYPE: ICD-10-CM

## 2025-02-17 DIAGNOSIS — N18.9 ACUTE KIDNEY INJURY SUPERIMPOSED ON CKD: ICD-10-CM

## 2025-02-17 DIAGNOSIS — R60.1 ANASARCA: ICD-10-CM

## 2025-02-17 DIAGNOSIS — I50.811 ACUTE RIGHT-SIDED HEART FAILURE (HCC): ICD-10-CM

## 2025-02-17 DIAGNOSIS — R18.8 ASCITES OF LIVER: ICD-10-CM

## 2025-02-17 DIAGNOSIS — J96.22 ACUTE ON CHRONIC RESPIRATORY FAILURE WITH HYPOXIA AND HYPERCAPNIA (HCC): Primary | ICD-10-CM

## 2025-02-17 DIAGNOSIS — N17.9 ACUTE KIDNEY INJURY SUPERIMPOSED ON CKD: ICD-10-CM

## 2025-02-17 DIAGNOSIS — I27.20 PULMONARY HTN (HCC): Chronic | ICD-10-CM

## 2025-02-17 DIAGNOSIS — I50.22 HEART FAILURE WITH MID-RANGE EJECTION FRACTION (HCC): ICD-10-CM

## 2025-02-17 DIAGNOSIS — R18.8 CIRRHOSIS OF LIVER WITH ASCITES, UNSPECIFIED HEPATIC CIRRHOSIS TYPE (HCC): ICD-10-CM

## 2025-02-17 DIAGNOSIS — J96.11 CHRONIC RESPIRATORY FAILURE WITH HYPOXIA AND HYPERCAPNIA (HCC): Chronic | ICD-10-CM

## 2025-02-17 DIAGNOSIS — R06.02 SHORTNESS OF BREATH: ICD-10-CM

## 2025-02-17 PROBLEM — E66.01 MORBID OBESITY WITH BMI OF 40.0-44.9, ADULT: Status: ACTIVE | Noted: 2025-02-17

## 2025-02-17 PROBLEM — J90 PLEURAL EFFUSION: Status: ACTIVE | Noted: 2025-02-17

## 2025-02-17 PROBLEM — I26.09 ACUTE COR PULMONALE (HCC): Chronic | Status: ACTIVE | Noted: 2024-05-23

## 2025-02-17 PROBLEM — I48.19 PERSISTENT ATRIAL FIBRILLATION (HCC): Chronic | Status: ACTIVE | Noted: 2025-01-08

## 2025-02-17 PROBLEM — N28.89 RENAL MASS: Status: ACTIVE | Noted: 2025-02-17

## 2025-02-17 LAB
ALBUMIN SERPL-MCNC: 3.5 G/DL (ref 3.2–4.6)
ALBUMIN/GLOB SERPL: 1 (ref 1–1.9)
ALP SERPL-CCNC: 112 U/L (ref 40–129)
ALT SERPL-CCNC: 19 U/L (ref 8–55)
ANION GAP SERPL CALC-SCNC: 13 MMOL/L (ref 7–16)
APPEARANCE UR: CLEAR
APTT PPP: 38 SEC (ref 23.3–37.4)
AST SERPL-CCNC: 24 U/L (ref 15–37)
BACTERIA URNS QL MICRO: ABNORMAL /HPF
BASE EXCESS BLDV CALC-SCNC: 7.7 MMOL/L
BASOPHILS # BLD: 0.07 K/UL (ref 0–0.2)
BASOPHILS NFR BLD: 0.4 % (ref 0–2)
BILIRUB SERPL-MCNC: 1.1 MG/DL (ref 0–1.2)
BILIRUB UR QL: NEGATIVE
BUN SERPL-MCNC: 55 MG/DL (ref 8–23)
CALCIUM SERPL-MCNC: 9.1 MG/DL (ref 8.8–10.2)
CHLORIDE SERPL-SCNC: 91 MMOL/L (ref 98–107)
CO2 SERPL-SCNC: 31 MMOL/L (ref 20–29)
COLOR UR: ABNORMAL
CREAT SERPL-MCNC: 2.27 MG/DL (ref 0.8–1.3)
CRP SERPL HS-MCNC: 18.3 MG/L (ref 0–3)
DIFFERENTIAL METHOD BLD: ABNORMAL
EKG ATRIAL RATE: 101 BPM
EKG DIAGNOSIS: NORMAL
EKG Q-T INTERVAL: 354 MS
EKG QRS DURATION: 121 MS
EKG QTC CALCULATION (BAZETT): 464 MS
EKG R AXIS: 81 DEGREES
EKG T AXIS: 45 DEGREES
EKG VENTRICULAR RATE: 103 BPM
EOSINOPHIL # BLD: 0.11 K/UL (ref 0–0.8)
EOSINOPHIL NFR BLD: 0.7 % (ref 0.5–7.8)
ERYTHROCYTE [DISTWIDTH] IN BLOOD BY AUTOMATED COUNT: 20.5 % (ref 11.9–14.6)
ERYTHROCYTE [DISTWIDTH] IN BLOOD BY AUTOMATED COUNT: 21 % (ref 11.9–14.6)
GAS FLOW.O2 O2 DELIVERY SYS: ABNORMAL
GLOBULIN SER CALC-MCNC: 3.5 G/DL (ref 2.3–3.5)
GLUCOSE SERPL-MCNC: 101 MG/DL (ref 70–99)
GLUCOSE UR STRIP.AUTO-MCNC: NEGATIVE MG/DL
HCO3 BLDV-SCNC: 38.7 MMOL/L (ref 23–28)
HCT VFR BLD AUTO: 45.1 % (ref 41.1–50.3)
HCT VFR BLD AUTO: 47.6 % (ref 41.1–50.3)
HGB BLD-MCNC: 13.4 G/DL (ref 13.6–17.2)
HGB BLD-MCNC: 14.7 G/DL (ref 13.6–17.2)
HGB UR QL STRIP: NEGATIVE
IMM GRANULOCYTES # BLD AUTO: 0.1 K/UL (ref 0–0.5)
IMM GRANULOCYTES NFR BLD AUTO: 0.6 % (ref 0–5)
INR PPP: 2
KETONES UR QL STRIP.AUTO: NEGATIVE MG/DL
LACTATE SERPL-SCNC: 0.9 MMOL/L (ref 0.5–2)
LACTATE SERPL-SCNC: 1.3 MMOL/L (ref 0.5–2)
LEUKOCYTE ESTERASE UR QL STRIP.AUTO: ABNORMAL
LYMPHOCYTES # BLD: 1.41 K/UL (ref 0.5–4.6)
LYMPHOCYTES NFR BLD: 8.8 % (ref 13–44)
MAGNESIUM SERPL-MCNC: 2.6 MG/DL (ref 1.8–2.4)
MCH RBC QN AUTO: 26.4 PG (ref 26.1–32.9)
MCH RBC QN AUTO: 26.5 PG (ref 26.1–32.9)
MCHC RBC AUTO-ENTMCNC: 29.7 G/DL (ref 31.4–35)
MCHC RBC AUTO-ENTMCNC: 30.9 G/DL (ref 31.4–35)
MCV RBC AUTO: 85.9 FL (ref 82–102)
MCV RBC AUTO: 88.8 FL (ref 82–102)
MONOCYTES # BLD: 1.62 K/UL (ref 0.1–1.3)
MONOCYTES NFR BLD: 10.1 % (ref 4–12)
NEUTS SEG # BLD: 12.68 K/UL (ref 1.7–8.2)
NEUTS SEG NFR BLD: 79.4 % (ref 43–78)
NITRITE UR QL STRIP.AUTO: POSITIVE
NRBC # BLD: 0 K/UL (ref 0–0.2)
NRBC # BLD: 0 K/UL (ref 0–0.2)
NT PRO BNP: 1943 PG/ML (ref 0–125)
OTHER OBSERVATIONS: ABNORMAL
PCO2 BLDV: 78.3 MMHG (ref 41–51)
PH BLDV: 7.3 (ref 7.32–7.42)
PH UR STRIP: 5 (ref 5–9)
PLATELET # BLD AUTO: 540 K/UL (ref 150–450)
PLATELET # BLD AUTO: 546 K/UL (ref 150–450)
PMV BLD AUTO: 8.7 FL (ref 9.4–12.3)
PMV BLD AUTO: 8.9 FL (ref 9.4–12.3)
PO2 BLDV: 26 MMHG
POTASSIUM SERPL-SCNC: 4.8 MMOL/L (ref 3.5–5.1)
PROCALCITONIN SERPL-MCNC: 0.12 NG/ML (ref 0–0.1)
PROT SERPL-MCNC: 7.1 G/DL (ref 6.3–8.2)
PROT UR STRIP-MCNC: NEGATIVE MG/DL
PROTHROMBIN TIME: 22.5 SEC (ref 11.3–14.9)
RBC # BLD AUTO: 5.08 M/UL (ref 4.23–5.6)
RBC # BLD AUTO: 5.54 M/UL (ref 4.23–5.6)
SAO2 % BLDV: 38.5 % (ref 65–88)
SERVICE CMNT-IMP: ABNORMAL
SODIUM SERPL-SCNC: 135 MMOL/L (ref 136–145)
SP GR UR REFRACTOMETRY: 1.01 (ref 1–1.02)
SPECIMEN TYPE: ABNORMAL
TROPONIN T SERPL HS-MCNC: 40 NG/L (ref 0–22)
UFH PPP CHRO-ACNC: >1.1 IU/ML (ref 0.3–0.7)
UROBILINOGEN UR QL STRIP.AUTO: 0.2 EU/DL (ref 0.2–1)
WBC # BLD AUTO: 15.4 K/UL (ref 4.3–11.1)
WBC # BLD AUTO: 16 K/UL (ref 4.3–11.1)
WBC URNS QL MICRO: ABNORMAL /HPF

## 2025-02-17 PROCEDURE — 6360000002 HC RX W HCPCS: Performed by: EMERGENCY MEDICINE

## 2025-02-17 PROCEDURE — 2700000000 HC OXYGEN THERAPY PER DAY

## 2025-02-17 PROCEDURE — 94660 CPAP INITIATION&MGMT: CPT

## 2025-02-17 PROCEDURE — 93010 ELECTROCARDIOGRAM REPORT: CPT | Performed by: INTERNAL MEDICINE

## 2025-02-17 PROCEDURE — 6370000000 HC RX 637 (ALT 250 FOR IP): Performed by: EMERGENCY MEDICINE

## 2025-02-17 PROCEDURE — 96375 TX/PRO/DX INJ NEW DRUG ADDON: CPT

## 2025-02-17 PROCEDURE — 94761 N-INVAS EAR/PLS OXIMETRY MLT: CPT

## 2025-02-17 PROCEDURE — 6360000004 HC RX CONTRAST MEDICATION: Performed by: INTERNAL MEDICINE

## 2025-02-17 PROCEDURE — 2000000000 HC ICU R&B

## 2025-02-17 PROCEDURE — 6370000000 HC RX 637 (ALT 250 FOR IP): Performed by: INTERNAL MEDICINE

## 2025-02-17 PROCEDURE — 83735 ASSAY OF MAGNESIUM: CPT

## 2025-02-17 PROCEDURE — 96365 THER/PROPH/DIAG IV INF INIT: CPT

## 2025-02-17 PROCEDURE — 85027 COMPLETE CBC AUTOMATED: CPT

## 2025-02-17 PROCEDURE — 2500000003 HC RX 250 WO HCPCS: Performed by: INTERNAL MEDICINE

## 2025-02-17 PROCEDURE — 84145 PROCALCITONIN (PCT): CPT

## 2025-02-17 PROCEDURE — 94640 AIRWAY INHALATION TREATMENT: CPT

## 2025-02-17 PROCEDURE — 85730 THROMBOPLASTIN TIME PARTIAL: CPT

## 2025-02-17 PROCEDURE — 81001 URINALYSIS AUTO W/SCOPE: CPT

## 2025-02-17 PROCEDURE — 84484 ASSAY OF TROPONIN QUANT: CPT

## 2025-02-17 PROCEDURE — 2580000003 HC RX 258: Performed by: EMERGENCY MEDICINE

## 2025-02-17 PROCEDURE — 87040 BLOOD CULTURE FOR BACTERIA: CPT

## 2025-02-17 PROCEDURE — 36415 COLL VENOUS BLD VENIPUNCTURE: CPT

## 2025-02-17 PROCEDURE — 99223 1ST HOSP IP/OBS HIGH 75: CPT | Performed by: INTERNAL MEDICINE

## 2025-02-17 PROCEDURE — 71045 X-RAY EXAM CHEST 1 VIEW: CPT

## 2025-02-17 PROCEDURE — 93005 ELECTROCARDIOGRAM TRACING: CPT | Performed by: EMERGENCY MEDICINE

## 2025-02-17 PROCEDURE — 82803 BLOOD GASES ANY COMBINATION: CPT

## 2025-02-17 PROCEDURE — 71260 CT THORAX DX C+: CPT

## 2025-02-17 PROCEDURE — 86141 C-REACTIVE PROTEIN HS: CPT

## 2025-02-17 PROCEDURE — 6360000002 HC RX W HCPCS: Performed by: INTERNAL MEDICINE

## 2025-02-17 PROCEDURE — 99285 EMERGENCY DEPT VISIT HI MDM: CPT

## 2025-02-17 PROCEDURE — 83605 ASSAY OF LACTIC ACID: CPT

## 2025-02-17 PROCEDURE — 93306 TTE W/DOPPLER COMPLETE: CPT

## 2025-02-17 PROCEDURE — 80053 COMPREHEN METABOLIC PANEL: CPT

## 2025-02-17 PROCEDURE — 83880 ASSAY OF NATRIURETIC PEPTIDE: CPT

## 2025-02-17 PROCEDURE — 96367 TX/PROPH/DG ADDL SEQ IV INF: CPT

## 2025-02-17 PROCEDURE — 85520 HEPARIN ASSAY: CPT

## 2025-02-17 PROCEDURE — 85025 COMPLETE CBC W/AUTO DIFF WBC: CPT

## 2025-02-17 PROCEDURE — 85610 PROTHROMBIN TIME: CPT

## 2025-02-17 RX ORDER — ONDANSETRON 4 MG/1
4 TABLET, ORALLY DISINTEGRATING ORAL EVERY 8 HOURS PRN
Status: DISCONTINUED | OUTPATIENT
Start: 2025-02-17 | End: 2025-03-02 | Stop reason: HOSPADM

## 2025-02-17 RX ORDER — ACETAMINOPHEN 325 MG/1
650 TABLET ORAL EVERY 6 HOURS PRN
Status: DISCONTINUED | OUTPATIENT
Start: 2025-02-17 | End: 2025-02-18 | Stop reason: SDUPTHER

## 2025-02-17 RX ORDER — DIATRIZOATE MEGLUMINE AND DIATRIZOATE SODIUM 660; 100 MG/ML; MG/ML
15 SOLUTION ORAL; RECTAL
Status: DISCONTINUED | OUTPATIENT
Start: 2025-02-17 | End: 2025-03-02 | Stop reason: HOSPADM

## 2025-02-17 RX ORDER — ASPIRIN 81 MG/1
81 TABLET, CHEWABLE ORAL DAILY
Status: DISCONTINUED | OUTPATIENT
Start: 2025-02-17 | End: 2025-03-02 | Stop reason: HOSPADM

## 2025-02-17 RX ORDER — DILTIAZEM HYDROCHLORIDE 120 MG/1
120 CAPSULE, COATED, EXTENDED RELEASE ORAL DAILY
Status: DISCONTINUED | OUTPATIENT
Start: 2025-02-17 | End: 2025-03-02 | Stop reason: HOSPADM

## 2025-02-17 RX ORDER — MAGNESIUM SULFATE IN WATER 40 MG/ML
2000 INJECTION, SOLUTION INTRAVENOUS PRN
Status: DISCONTINUED | OUTPATIENT
Start: 2025-02-17 | End: 2025-03-02 | Stop reason: HOSPADM

## 2025-02-17 RX ORDER — ONDANSETRON 2 MG/ML
4 INJECTION INTRAMUSCULAR; INTRAVENOUS EVERY 6 HOURS PRN
Status: DISCONTINUED | OUTPATIENT
Start: 2025-02-17 | End: 2025-03-02 | Stop reason: HOSPADM

## 2025-02-17 RX ORDER — HEPARIN SODIUM 1000 [USP'U]/ML
4000 INJECTION, SOLUTION INTRAVENOUS; SUBCUTANEOUS PRN
Status: DISCONTINUED | OUTPATIENT
Start: 2025-02-17 | End: 2025-02-17 | Stop reason: SDUPTHER

## 2025-02-17 RX ORDER — HEPARIN SODIUM 1000 [USP'U]/ML
4000 INJECTION, SOLUTION INTRAVENOUS; SUBCUTANEOUS PRN
Status: DISCONTINUED | OUTPATIENT
Start: 2025-02-17 | End: 2025-02-28

## 2025-02-17 RX ORDER — FERROUS SULFATE 325(65) MG
325 TABLET ORAL 2 TIMES DAILY WITH MEALS
Status: DISCONTINUED | OUTPATIENT
Start: 2025-02-17 | End: 2025-03-02 | Stop reason: HOSPADM

## 2025-02-17 RX ORDER — SODIUM CHLORIDE 0.9 % (FLUSH) 0.9 %
5-40 SYRINGE (ML) INJECTION PRN
Status: DISCONTINUED | OUTPATIENT
Start: 2025-02-17 | End: 2025-03-02 | Stop reason: HOSPADM

## 2025-02-17 RX ORDER — HEPARIN SODIUM 10000 [USP'U]/100ML
5-30 INJECTION, SOLUTION INTRAVENOUS CONTINUOUS
Status: DISCONTINUED | OUTPATIENT
Start: 2025-02-17 | End: 2025-02-17 | Stop reason: SDUPTHER

## 2025-02-17 RX ORDER — IPRATROPIUM BROMIDE AND ALBUTEROL SULFATE 2.5; .5 MG/3ML; MG/3ML
1 SOLUTION RESPIRATORY (INHALATION)
Status: COMPLETED | OUTPATIENT
Start: 2025-02-17 | End: 2025-02-17

## 2025-02-17 RX ORDER — IPRATROPIUM BROMIDE AND ALBUTEROL SULFATE 2.5; .5 MG/3ML; MG/3ML
1 SOLUTION RESPIRATORY (INHALATION)
Status: DISCONTINUED | OUTPATIENT
Start: 2025-02-17 | End: 2025-02-23

## 2025-02-17 RX ORDER — HEPARIN SODIUM 1000 [USP'U]/ML
4000 INJECTION, SOLUTION INTRAVENOUS; SUBCUTANEOUS ONCE
Status: COMPLETED | OUTPATIENT
Start: 2025-02-17 | End: 2025-02-17

## 2025-02-17 RX ORDER — POTASSIUM CHLORIDE 7.45 MG/ML
10 INJECTION INTRAVENOUS PRN
Status: DISCONTINUED | OUTPATIENT
Start: 2025-02-17 | End: 2025-03-02 | Stop reason: HOSPADM

## 2025-02-17 RX ORDER — BUDESONIDE 0.5 MG/2ML
0.5 INHALANT ORAL
Status: DISCONTINUED | OUTPATIENT
Start: 2025-02-17 | End: 2025-03-02 | Stop reason: HOSPADM

## 2025-02-17 RX ORDER — HEPARIN SODIUM 1000 [USP'U]/ML
2000 INJECTION, SOLUTION INTRAVENOUS; SUBCUTANEOUS PRN
Status: DISCONTINUED | OUTPATIENT
Start: 2025-02-17 | End: 2025-02-17 | Stop reason: SDUPTHER

## 2025-02-17 RX ORDER — POTASSIUM CHLORIDE 29.8 MG/ML
20 INJECTION INTRAVENOUS PRN
Status: DISCONTINUED | OUTPATIENT
Start: 2025-02-17 | End: 2025-03-02 | Stop reason: HOSPADM

## 2025-02-17 RX ORDER — HEPARIN SODIUM 1000 [USP'U]/ML
2000 INJECTION, SOLUTION INTRAVENOUS; SUBCUTANEOUS PRN
Status: DISCONTINUED | OUTPATIENT
Start: 2025-02-17 | End: 2025-02-28

## 2025-02-17 RX ORDER — HEPARIN SODIUM 1000 [USP'U]/ML
4000 INJECTION, SOLUTION INTRAVENOUS; SUBCUTANEOUS ONCE
Status: DISCONTINUED | OUTPATIENT
Start: 2025-02-17 | End: 2025-02-17 | Stop reason: SDUPTHER

## 2025-02-17 RX ORDER — SODIUM CHLORIDE 0.9 % (FLUSH) 0.9 %
5-40 SYRINGE (ML) INJECTION EVERY 12 HOURS SCHEDULED
Status: DISCONTINUED | OUTPATIENT
Start: 2025-02-17 | End: 2025-03-02 | Stop reason: HOSPADM

## 2025-02-17 RX ORDER — HEPARIN SODIUM 10000 [USP'U]/100ML
5-30 INJECTION, SOLUTION INTRAVENOUS CONTINUOUS
Status: DISCONTINUED | OUTPATIENT
Start: 2025-02-17 | End: 2025-02-28

## 2025-02-17 RX ORDER — FUROSEMIDE 10 MG/ML
60 INJECTION INTRAMUSCULAR; INTRAVENOUS ONCE
Status: COMPLETED | OUTPATIENT
Start: 2025-02-17 | End: 2025-02-17

## 2025-02-17 RX ORDER — POLYETHYLENE GLYCOL 3350 17 G/17G
17 POWDER, FOR SOLUTION ORAL DAILY PRN
Status: DISCONTINUED | OUTPATIENT
Start: 2025-02-17 | End: 2025-03-02 | Stop reason: HOSPADM

## 2025-02-17 RX ORDER — LIDOCAINE HYDROCHLORIDE 20 MG/ML
JELLY TOPICAL
Status: COMPLETED | OUTPATIENT
Start: 2025-02-17 | End: 2025-02-17

## 2025-02-17 RX ORDER — SPIRONOLACTONE 25 MG/1
25 TABLET ORAL DAILY
Status: DISCONTINUED | OUTPATIENT
Start: 2025-02-17 | End: 2025-02-26

## 2025-02-17 RX ORDER — SODIUM CHLORIDE 9 MG/ML
INJECTION, SOLUTION INTRAVENOUS PRN
Status: DISCONTINUED | OUTPATIENT
Start: 2025-02-17 | End: 2025-03-02 | Stop reason: HOSPADM

## 2025-02-17 RX ORDER — METOPROLOL SUCCINATE 50 MG/1
100 TABLET, EXTENDED RELEASE ORAL DAILY
Status: DISCONTINUED | OUTPATIENT
Start: 2025-02-17 | End: 2025-02-21

## 2025-02-17 RX ORDER — AMIODARONE HYDROCHLORIDE 200 MG/1
100 TABLET ORAL DAILY
Status: DISCONTINUED | OUTPATIENT
Start: 2025-02-17 | End: 2025-03-02 | Stop reason: HOSPADM

## 2025-02-17 RX ORDER — IOPAMIDOL 755 MG/ML
100 INJECTION, SOLUTION INTRAVASCULAR
Status: COMPLETED | OUTPATIENT
Start: 2025-02-17 | End: 2025-02-17

## 2025-02-17 RX ORDER — ACETAMINOPHEN 650 MG/1
650 SUPPOSITORY RECTAL EVERY 6 HOURS PRN
Status: DISCONTINUED | OUTPATIENT
Start: 2025-02-17 | End: 2025-03-02 | Stop reason: HOSPADM

## 2025-02-17 RX ADMIN — HEPARIN SODIUM 6 UNITS/KG/HR: 10000 INJECTION, SOLUTION INTRAVENOUS at 18:50

## 2025-02-17 RX ADMIN — FUROSEMIDE 60 MG: 10 INJECTION, SOLUTION INTRAMUSCULAR; INTRAVENOUS at 14:58

## 2025-02-17 RX ADMIN — AZITHROMYCIN MONOHYDRATE 500 MG: 500 INJECTION, POWDER, LYOPHILIZED, FOR SOLUTION INTRAVENOUS at 15:35

## 2025-02-17 RX ADMIN — FERROUS SULFATE TAB 325 MG (65 MG ELEMENTAL FE) 325 MG: 325 (65 FE) TAB at 18:20

## 2025-02-17 RX ADMIN — BUDESONIDE INHALATION 500 MCG: 0.5 SUSPENSION RESPIRATORY (INHALATION) at 19:40

## 2025-02-17 RX ADMIN — IPRATROPIUM BROMIDE AND ALBUTEROL SULFATE 1 DOSE: 2.5; .5 SOLUTION RESPIRATORY (INHALATION) at 19:40

## 2025-02-17 RX ADMIN — LIDOCAINE HYDROCHLORIDE: 20 JELLY TOPICAL at 13:27

## 2025-02-17 RX ADMIN — DILTIAZEM HYDROCHLORIDE 120 MG: 120 CAPSULE, COATED, EXTENDED RELEASE ORAL at 18:21

## 2025-02-17 RX ADMIN — HEPARIN SODIUM 4000 UNITS: 1000 INJECTION INTRAVENOUS; SUBCUTANEOUS at 18:48

## 2025-02-17 RX ADMIN — IPRATROPIUM BROMIDE AND ALBUTEROL SULFATE 1 DOSE: 2.5; .5 SOLUTION RESPIRATORY (INHALATION) at 15:16

## 2025-02-17 RX ADMIN — IOPAMIDOL 100 ML: 755 INJECTION, SOLUTION INTRAVENOUS at 17:12

## 2025-02-17 RX ADMIN — SODIUM CHLORIDE, PRESERVATIVE FREE 10 ML: 5 INJECTION INTRAVENOUS at 21:33

## 2025-02-17 RX ADMIN — DIATRIZOATE MEGLUMINE AND DIATRIZOATE SODIUM 15 ML: 660; 100 LIQUID ORAL; RECTAL at 16:08

## 2025-02-17 RX ADMIN — CEFTRIAXONE SODIUM 2000 MG: 2 INJECTION, POWDER, FOR SOLUTION INTRAMUSCULAR; INTRAVENOUS at 15:01

## 2025-02-17 RX ADMIN — ASPIRIN 81 MG: 81 TABLET, CHEWABLE ORAL at 18:21

## 2025-02-17 RX ADMIN — BUMETANIDE 0.5 MG/HR: 0.25 INJECTION INTRAMUSCULAR; INTRAVENOUS at 17:33

## 2025-02-17 RX ADMIN — AMIODARONE HYDROCHLORIDE 100 MG: 200 TABLET ORAL at 18:20

## 2025-02-17 RX ADMIN — METOPROLOL SUCCINATE 100 MG: 50 TABLET, EXTENDED RELEASE ORAL at 21:33

## 2025-02-17 ASSESSMENT — PAIN SCALES - GENERAL
PAINLEVEL_OUTOF10: 0
PAINLEVEL_OUTOF10: 0

## 2025-02-17 NOTE — ED TRIAGE NOTES
Pt reports legs and testicle swelling that has increased over last 2 days.  Pt has afib and is sob.  Pt is scheduled for Friday but needs to have swelling treated first.  Dr Vasquez instructed to come to ER.

## 2025-02-17 NOTE — ED PROVIDER NOTES
Brain Natriuretic Peptide   Result Value Ref Range    NT Pro-BNP 1,943 (H) 0 - 125 PG/ML   Troponin   Result Value Ref Range    Troponin T 40.0 (H) 0 - 22 ng/L   Procalcitonin   Result Value Ref Range    Procalcitonin 0.12 (H) 0.00 - 0.10 ng/mL   Venous Blood Gas, POC   Result Value Ref Range    DEVICE NASAL CANNULA      PH, VENOUS (POC) 7.30 (L) 7.32 - 7.42      PCO2, Bhavya, POC 78.3 (H) 41 - 51 MMHG    PO2, VENOUS (POC) 26 (LL) mmHg    HCO3, Venous 38.7 (H) 23 - 28 MMOL/L    SO2, VENOUS (POC) 38.5 (L) 65 - 88 %    BASE EXCESS, VENOUS (POC) 7.7 mmol/L    Specimen type: VENOUS BLOOD      Performed by: Holland    EKG 12 Lead   Result Value Ref Range    Ventricular Rate 103 BPM    Atrial Rate 101 BPM    QRS Duration 121 ms    Q-T Interval 354 ms    QTc Calculation (Bazett) 464 ms    R Axis 81 degrees    T Axis 45 degrees    Diagnosis       Atrial fibrillation  IVCD, consider atypical RBBB    Confirmed by MAYA CABRERA (), GERTRUDE MOSELEY ([id number]) on 2/17/2025 2:18:27 PM           XR CHEST PORTABLE   Final Result   Findings/impression: Ill-defined opacities at the bilateral lung bases.   Prominent interstitial markings seen throughout the lungs as well as prominence   of central pulmonary vasculature. The heart is enlarged. The findings may   represent CHF in the appropriate clinical setting. Recommend dedicated   PA/lateral chest when clinically feasible.      Electronically signed by Conrad Gerber                   No results for input(s): \"COVID19\" in the last 72 hours.     Voice dictation software was used during the making of this note.  This software is not perfect and grammatical and other typographical errors may be present.  This note has not been completely proofread for errors.     Valdo Garcia Jr., MD  02/17/25 3282

## 2025-02-17 NOTE — ED NOTES
Murillo attempted with 16fr, 18fr, 20fr w/ no success. Provider notified     Moshe Smith RN  02/17/25 9171

## 2025-02-17 NOTE — TELEPHONE ENCOUNTER
Pt is very swollen this morning. Is having a procedure (ablation) done on Friday. Would like to be admitted to hospital sooner prior to procedure because he is having so many issues.

## 2025-02-17 NOTE — TELEPHONE ENCOUNTER
Patient is extremely swollen- edema up to highs thighs. Legs are tight and he has some redness near his thighs that are warm to touch. Reports worsening shortness of breath- on Bipap. HR all over the places 100's. Patient is scheduled for ablation this Friday. Encouraged patient to go to ER- level of edema not able to be managed at home. Patient voices understanding of instructions.

## 2025-02-17 NOTE — ED NOTES
TRANSFER - OUT REPORT:    Verbal report given to SHAWN Wolff on Tay Conklin  being transferred to [facility] for routine progression of patient care       Report consisted of patient's Situation, Background, Assessment and   Recommendations(SBAR).     Information from the following report(s) ED Encounter Summary was reviewed with the receiving nurse.    [place] Fall Assessment:    Presents to emergency department  because of falls (Syncope, seizure, or loss of consciousness): Yes  Age > 70: No  Altered Mental Status, Intoxication with alcohol or substance confusion (Disorientation, impaired judgment, poor safety awaremess, or inability to follow instructions): Yes  Impaired Mobility: Ambulates or transfers with assistive devices or assistance; Unable to ambulate or transer.: Yes  Nursing Judgement: Yes          Lines:   Peripheral IV 02/17/25 Left Antecubital (Active)        Opportunity for questions and clarification was provided.      Patient transported with:  Registered Nurse           Moshe Smith RN  02/17/25 5059

## 2025-02-18 ENCOUNTER — APPOINTMENT (OUTPATIENT)
Dept: INTERVENTIONAL RADIOLOGY/VASCULAR | Age: 65
DRG: 871 | End: 2025-02-18
Attending: INTERNAL MEDICINE
Payer: COMMERCIAL

## 2025-02-18 LAB
ALBUMIN FLD-MCNC: 2.1 G/DL
ALBUMIN SERPL-MCNC: 3.1 G/DL (ref 3.2–4.6)
ALBUMIN/GLOB SERPL: 0.9 (ref 1–1.9)
ALP SERPL-CCNC: 103 U/L (ref 40–129)
ALT SERPL-CCNC: 17 U/L (ref 8–55)
ANION GAP SERPL CALC-SCNC: 8 MMOL/L (ref 7–16)
APTT PPP: 139.9 SEC (ref 23.3–37.4)
APTT PPP: 49.6 SEC (ref 23.3–37.4)
APTT PPP: 61.8 SEC (ref 23.3–37.4)
AST SERPL-CCNC: 16 U/L (ref 15–37)
BILIRUB SERPL-MCNC: 0.7 MG/DL (ref 0–1.2)
BUN SERPL-MCNC: 53 MG/DL (ref 8–23)
CALCIUM SERPL-MCNC: 8.7 MG/DL (ref 8.8–10.2)
CHLORIDE SERPL-SCNC: 94 MMOL/L (ref 98–107)
CO2 SERPL-SCNC: 34 MMOL/L (ref 20–29)
CREAT SERPL-MCNC: 2.07 MG/DL (ref 0.8–1.3)
ECHO AO ASC DIAM: 3.8 CM
ECHO AO ASCENDING AORTA INDEX: 1.41 CM/M2
ECHO AO ROOT DIAM: 3.7 CM
ECHO AO ROOT INDEX: 1.38 CM/M2
ECHO AV AREA PEAK VELOCITY: 2.9 CM2
ECHO AV AREA VTI: 2.7 CM2
ECHO AV AREA/BSA PEAK VELOCITY: 1.1 CM2/M2
ECHO AV AREA/BSA VTI: 1 CM2/M2
ECHO AV MEAN GRADIENT: 3 MMHG
ECHO AV MEAN GRADIENT: 3 MMHG
ECHO AV MEAN VELOCITY: 0.9 M/S
ECHO AV PEAK GRADIENT: 7 MMHG
ECHO AV PEAK GRADIENT: 7 MMHG
ECHO AV PEAK VELOCITY: 1.3 M/S
ECHO AV VELOCITY RATIO: 0.77
ECHO AV VTI: 20.5 CM
ECHO BSA: 2.79 M2
ECHO EST RA PRESSURE: 15 MMHG
ECHO LA AREA 2C: 25.4 CM2
ECHO LA AREA 4C: 24.1 CM2
ECHO LA MAJOR AXIS: 6 CM
ECHO LA MINOR AXIS: 6.6 CM
ECHO LA VOL BP: 79 ML (ref 18–58)
ECHO LA VOL MOD A2C: 80 ML (ref 18–58)
ECHO LA VOL MOD A4C: 71 ML (ref 18–58)
ECHO LA VOL/BSA BIPLANE: 29 ML/M2 (ref 16–34)
ECHO LA VOLUME INDEX MOD A2C: 30 ML/M2 (ref 16–34)
ECHO LA VOLUME INDEX MOD A4C: 26 ML/M2 (ref 16–34)
ECHO LV E' LATERAL VELOCITY: 30.2 CM/S
ECHO LV E' SEPTAL VELOCITY: 11.9 CM/S
ECHO LV EF PHYSICIAN: 45 %
ECHO LV FRACTIONAL SHORTENING: 31 % (ref 28–44)
ECHO LV INTERNAL DIMENSION DIASTOLE INDEX: 2.38 CM/M2
ECHO LV INTERNAL DIMENSION DIASTOLIC: 6.4 CM (ref 4.2–5.9)
ECHO LV INTERNAL DIMENSION SYSTOLIC INDEX: 1.64 CM/M2
ECHO LV INTERNAL DIMENSION SYSTOLIC: 4.4 CM
ECHO LV IVSD: 1.3 CM (ref 0.6–1)
ECHO LV MASS 2D: 389 G (ref 88–224)
ECHO LV MASS INDEX 2D: 144.6 G/M2 (ref 49–115)
ECHO LV POSTERIOR WALL DIASTOLIC: 1.3 CM (ref 0.6–1)
ECHO LV RELATIVE WALL THICKNESS RATIO: 0.41
ECHO LVOT AREA: 3.8 CM2
ECHO LVOT AV VTI INDEX: 0.72
ECHO LVOT DIAM: 2.2 CM
ECHO LVOT MEAN GRADIENT: 2 MMHG
ECHO LVOT PEAK GRADIENT: 4 MMHG
ECHO LVOT PEAK VELOCITY: 1 M/S
ECHO LVOT STROKE VOLUME INDEX: 20.9 ML/M2
ECHO LVOT SV: 56.2 ML
ECHO LVOT VTI: 14.8 CM
ECHO MV E DECELERATION TIME (DT): 180 MS
ECHO MV E VELOCITY: 0.94 M/S
ECHO MV E/E' LATERAL: 3.11
ECHO MV E/E' RATIO (AVERAGED): 5.51
ECHO MV E/E' SEPTAL: 7.9
ECHO RIGHT VENTRICULAR SYSTOLIC PRESSURE (RVSP): 41 MMHG
ECHO RV TAPSE: 1.7 CM (ref 1.7–?)
ECHO TV REGURGITANT MAX VELOCITY: 2.55 M/S
ECHO TV REGURGITANT PEAK GRADIENT: 26 MMHG
GLOBULIN SER CALC-MCNC: 3.3 G/DL (ref 2.3–3.5)
GLUCOSE SERPL-MCNC: 102 MG/DL (ref 70–99)
HAV IGM SER QL: NONREACTIVE
HBV CORE IGM SER QL: NONREACTIVE
HBV SURFACE AG SER QL: NONREACTIVE
HCV AB SER QL: NONREACTIVE
MAGNESIUM SERPL-MCNC: 2.5 MG/DL (ref 1.8–2.4)
PHOSPHATE SERPL-MCNC: 5.9 MG/DL (ref 2.5–4.5)
POTASSIUM SERPL-SCNC: 4.7 MMOL/L (ref 3.5–5.1)
PROT SERPL-MCNC: 6.5 G/DL (ref 6.3–8.2)
SODIUM SERPL-SCNC: 136 MMOL/L (ref 136–145)
SPECIMEN SOURCE FLD: NORMAL

## 2025-02-18 PROCEDURE — 88112 CYTOPATH CELL ENHANCE TECH: CPT

## 2025-02-18 PROCEDURE — 2700000000 HC OXYGEN THERAPY PER DAY

## 2025-02-18 PROCEDURE — 6360000002 HC RX W HCPCS: Performed by: INTERNAL MEDICINE

## 2025-02-18 PROCEDURE — 2500000003 HC RX 250 WO HCPCS: Performed by: INTERNAL MEDICINE

## 2025-02-18 PROCEDURE — 36415 COLL VENOUS BLD VENIPUNCTURE: CPT

## 2025-02-18 PROCEDURE — 6360000002 HC RX W HCPCS: Performed by: PHYSICIAN ASSISTANT

## 2025-02-18 PROCEDURE — 87070 CULTURE OTHR SPECIMN AEROBIC: CPT

## 2025-02-18 PROCEDURE — 49083 ABD PARACENTESIS W/IMAGING: CPT

## 2025-02-18 PROCEDURE — 2580000003 HC RX 258: Performed by: INTERNAL MEDICINE

## 2025-02-18 PROCEDURE — 83735 ASSAY OF MAGNESIUM: CPT

## 2025-02-18 PROCEDURE — 89050 BODY FLUID CELL COUNT: CPT

## 2025-02-18 PROCEDURE — 94660 CPAP INITIATION&MGMT: CPT

## 2025-02-18 PROCEDURE — 87205 SMEAR GRAM STAIN: CPT

## 2025-02-18 PROCEDURE — 80074 ACUTE HEPATITIS PANEL: CPT

## 2025-02-18 PROCEDURE — 6370000000 HC RX 637 (ALT 250 FOR IP): Performed by: INTERNAL MEDICINE

## 2025-02-18 PROCEDURE — 94640 AIRWAY INHALATION TREATMENT: CPT

## 2025-02-18 PROCEDURE — 88305 TISSUE EXAM BY PATHOLOGIST: CPT

## 2025-02-18 PROCEDURE — 82042 OTHER SOURCE ALBUMIN QUAN EA: CPT

## 2025-02-18 PROCEDURE — 6370000000 HC RX 637 (ALT 250 FOR IP): Performed by: PHYSICIAN ASSISTANT

## 2025-02-18 PROCEDURE — 85730 THROMBOPLASTIN TIME PARTIAL: CPT

## 2025-02-18 PROCEDURE — 84100 ASSAY OF PHOSPHORUS: CPT

## 2025-02-18 PROCEDURE — 99291 CRITICAL CARE FIRST HOUR: CPT | Performed by: INTERNAL MEDICINE

## 2025-02-18 PROCEDURE — 94761 N-INVAS EAR/PLS OXIMETRY MLT: CPT

## 2025-02-18 PROCEDURE — 80053 COMPREHEN METABOLIC PANEL: CPT

## 2025-02-18 PROCEDURE — 49083 ABD PARACENTESIS W/IMAGING: CPT | Performed by: PHYSICIAN ASSISTANT

## 2025-02-18 PROCEDURE — 93306 TTE W/DOPPLER COMPLETE: CPT | Performed by: INTERNAL MEDICINE

## 2025-02-18 PROCEDURE — 2000000000 HC ICU R&B

## 2025-02-18 PROCEDURE — P9047 ALBUMIN (HUMAN), 25%, 50ML: HCPCS | Performed by: PHYSICIAN ASSISTANT

## 2025-02-18 PROCEDURE — 99232 SBSQ HOSP IP/OBS MODERATE 35: CPT | Performed by: INTERNAL MEDICINE

## 2025-02-18 PROCEDURE — 0W9G3ZZ DRAINAGE OF PERITONEAL CAVITY, PERCUTANEOUS APPROACH: ICD-10-PCS | Performed by: PHYSICIAN ASSISTANT

## 2025-02-18 PROCEDURE — C1729 CATH, DRAINAGE: HCPCS

## 2025-02-18 RX ORDER — ACETAMINOPHEN 325 MG/1
650 TABLET ORAL EVERY 4 HOURS PRN
Status: DISCONTINUED | OUTPATIENT
Start: 2025-02-18 | End: 2025-03-02 | Stop reason: HOSPADM

## 2025-02-18 RX ORDER — LIDOCAINE HYDROCHLORIDE 10 MG/ML
INJECTION, SOLUTION EPIDURAL; INFILTRATION; INTRACAUDAL; PERINEURAL PRN
Status: COMPLETED | OUTPATIENT
Start: 2025-02-18 | End: 2025-02-18

## 2025-02-18 RX ORDER — SODIUM CHLORIDE 0.9 % (FLUSH) 0.9 %
5-40 SYRINGE (ML) INJECTION EVERY 12 HOURS SCHEDULED
Status: DISCONTINUED | OUTPATIENT
Start: 2025-02-18 | End: 2025-03-02 | Stop reason: HOSPADM

## 2025-02-18 RX ORDER — SODIUM CHLORIDE 9 MG/ML
INJECTION, SOLUTION INTRAVENOUS CONTINUOUS
Status: DISCONTINUED | OUTPATIENT
Start: 2025-02-18 | End: 2025-02-24

## 2025-02-18 RX ORDER — ALBUMIN (HUMAN) 12.5 G/50ML
SOLUTION INTRAVENOUS CONTINUOUS PRN
Status: COMPLETED | OUTPATIENT
Start: 2025-02-18 | End: 2025-02-18

## 2025-02-18 RX ORDER — SODIUM CHLORIDE 0.9 % (FLUSH) 0.9 %
5-40 SYRINGE (ML) INJECTION PRN
Status: DISCONTINUED | OUTPATIENT
Start: 2025-02-18 | End: 2025-03-02 | Stop reason: HOSPADM

## 2025-02-18 RX ADMIN — BUDESONIDE INHALATION 500 MCG: 0.5 SUSPENSION RESPIRATORY (INHALATION) at 08:40

## 2025-02-18 RX ADMIN — BUDESONIDE INHALATION 500 MCG: 0.5 SUSPENSION RESPIRATORY (INHALATION) at 19:47

## 2025-02-18 RX ADMIN — SODIUM CHLORIDE, PRESERVATIVE FREE 10 ML: 5 INJECTION INTRAVENOUS at 20:07

## 2025-02-18 RX ADMIN — HEPARIN SODIUM 4000 UNITS: 1000 INJECTION INTRAVENOUS; SUBCUTANEOUS at 01:04

## 2025-02-18 RX ADMIN — SODIUM CHLORIDE, PRESERVATIVE FREE 10 ML: 5 INJECTION INTRAVENOUS at 20:06

## 2025-02-18 RX ADMIN — HEPARIN SODIUM 2000 UNITS: 1000 INJECTION INTRAVENOUS; SUBCUTANEOUS at 22:49

## 2025-02-18 RX ADMIN — HEPARIN SODIUM 10 UNITS/KG/HR: 10000 INJECTION, SOLUTION INTRAVENOUS at 22:53

## 2025-02-18 RX ADMIN — LIDOCAINE HYDROCHLORIDE 10 ML: 10 INJECTION, SOLUTION EPIDURAL; INFILTRATION; INTRACAUDAL; PERINEURAL at 12:07

## 2025-02-18 RX ADMIN — IPRATROPIUM BROMIDE AND ALBUTEROL SULFATE 1 DOSE: 2.5; .5 SOLUTION RESPIRATORY (INHALATION) at 08:40

## 2025-02-18 RX ADMIN — ASPIRIN 81 MG: 81 TABLET, CHEWABLE ORAL at 08:29

## 2025-02-18 RX ADMIN — AMIODARONE HYDROCHLORIDE 100 MG: 200 TABLET ORAL at 08:29

## 2025-02-18 RX ADMIN — CEFTRIAXONE SODIUM 2000 MG: 2 INJECTION, POWDER, FOR SOLUTION INTRAMUSCULAR; INTRAVENOUS at 14:47

## 2025-02-18 RX ADMIN — FERROUS SULFATE TAB 325 MG (65 MG ELEMENTAL FE) 325 MG: 325 (65 FE) TAB at 08:29

## 2025-02-18 RX ADMIN — IPRATROPIUM BROMIDE AND ALBUTEROL SULFATE 1 DOSE: 2.5; .5 SOLUTION RESPIRATORY (INHALATION) at 16:16

## 2025-02-18 RX ADMIN — DILTIAZEM HYDROCHLORIDE 120 MG: 120 CAPSULE, COATED, EXTENDED RELEASE ORAL at 08:29

## 2025-02-18 RX ADMIN — SPIRONOLACTONE 25 MG: 25 TABLET ORAL at 08:29

## 2025-02-18 RX ADMIN — IPRATROPIUM BROMIDE AND ALBUTEROL SULFATE 1 DOSE: 2.5; .5 SOLUTION RESPIRATORY (INHALATION) at 19:47

## 2025-02-18 RX ADMIN — ALBUMIN (HUMAN) 25 G: 0.25 INJECTION, SOLUTION INTRAVENOUS at 12:49

## 2025-02-18 RX ADMIN — FERROUS SULFATE TAB 325 MG (65 MG ELEMENTAL FE) 325 MG: 325 (65 FE) TAB at 17:30

## 2025-02-18 RX ADMIN — SODIUM CHLORIDE, PRESERVATIVE FREE 10 ML: 5 INJECTION INTRAVENOUS at 08:30

## 2025-02-18 ASSESSMENT — PAIN SCALES - GENERAL
PAINLEVEL_OUTOF10: 0

## 2025-02-18 ASSESSMENT — PAIN - FUNCTIONAL ASSESSMENT: PAIN_FUNCTIONAL_ASSESSMENT: NONE - DENIES PAIN

## 2025-02-18 NOTE — INTERDISCIPLINARY ROUNDS
Multi-D Rounds/Checklist (leapfrog):  Lines: can any be removed?: None    External Urinary Catheter (Active)      DVT Prophylaxis: Ordered  Vent: N/A  Nutrition Ordered/appropriate: Ordered  Can antibiotics or other drugs be stopped? No  End Date set Yes   MRSA swab: NA  Inpat Anti-Infectives (From admission, onward)       Start     Ordered Stop    02/18/25 1400  cefTRIAXone (ROCEPHIN) 2,000 mg in sodium chloride 0.9 % 50 mL IVPB (mini-bag)  2,000 mg,   IntraVENous,   EVERY 24 HOURS         02/17/25 1550 02/24/25 1359                  Consults needed: None  A: Is pain control adequate? (has PRNs? Stop drip?) N/A  B: Sedation break and SBT? N/A  C: Is sedation choice appropriate? N/A  D: Delirium/CAM-ICU? No  E: Mobility goals/appropriateness? Yes  F: Family update and plan? Daughter is surrogate decision maker and is being updated daily by primary attending and nursing staff.    Monique Ng, APRN - CNP

## 2025-02-18 NOTE — ACP (ADVANCE CARE PLANNING)
Advance Care Planning     Advance Care Planning Activator (Inpatient)  Conversation Note      Date of ACP Conversation: 2/18/2025         ACP Activator: Марина Up RN    {When Decision Maker makes decisions on behalf of the incapacitated patient: Decision Maker is asked to consider and make decisions based on patient values, known preferences, or best interests.     Health Care Decision Maker: NO LW/HCPOA. Pt . Has 5 children who are legal NOK unless document completed stating otherwise.         Current Designated Health Care Decision Maker: Mckenna [phone]    Click here to complete Healthcare Decision Makers including section of the Healthcare Decision Maker Relationship (ie \"Primary\")  Today we documented Decision Maker(s) consistent with Legal Next of Kin hierarchy.    Care Preferences  Full code per MD orders.

## 2025-02-18 NOTE — H&P
NOTE done on 2/17 but wrote as a consult not H&P and was an H&P                      History and Physical Initial Visit NOTE           2/18/2025    Tay Rubin                        Date of Admission:  2/17/2025    The patient's chart is reviewed and the patient is discussed with the staff.    Subjective:     Patient is a 64 y.o.  male seen and evaluated at the request of Dr. Garcia for acute respiratory failure requiring BiPAP.     The patient is a 64-year-old male with a history of severe COPD, smoking history (cessation 2014), PHTN, A fibrillation/flutter,  LILA needing CPAP, renal mass and polycythemia.  He has been followed by [facility] and has been on lasix at home. He was seen at [facility] (Dr Starks for partial vs radical). He was seen by us in December 2024.     His current medications include Ventolin, Breo, and a nebulizer for albuterol. He is no longer taking Spiriva. His oxygen levels are stable, and he uses both a home concentrator and a portable oxygen device. In May 2024, the patient underwent cardioversion following a transesophageal echocardiogram (NADINE). In January 2025, he was seen by Dr Vasquez for an ablation and was to continue eliquis. In February 2025, he developed gross hematuria and CT from October 2024 showed indeterminate 5.8 cm masslike density at the upper pole of the right kidney. CT renal mass protocol 10/23/2024 showed continued presence of the right upper pole renal mass with enhancement with suspicion for renal cell carcinoma. He is scheduled to have an ablation on Friday (2/21) and will have surgery in April for renal mass.     He lives alone. He presented here with Sob and was placed on BiPAP.  He is grossly volume overloaded. He does not use home BiPAP machine (because he says he needs a new machine with humidity). He reports having abdominal pain at the umbilical hernia site, which is also red and painful on palpation. CXR shows volume overload and right effusion.

## 2025-02-18 NOTE — OR NURSING
TRANSFER - OUT REPORT:           Verbal report given to SHAWN Wolff on Tay Conklin  being transferred to ICU for routine progression of patient care      Report consisted of patient’s Situation, Background, Assessment and Recommendations(SBAR).          Information from the following report(s) SBAR and Procedure Summary was reviewed with the receiving nurse.       Opportunity for questions and clarification was provided.          Pt tolerated procedure well.     Other: skin glue clean, dry, intact, and nontender    VITALS:  BP 95/71   Pulse 83   Temp 97.6 °F (36.4 °C) (Infrared)   Resp 18   Ht 1.88 m (6' 2\")   Wt (!) 150.1 kg (331 lb)   SpO2 96%   BMI 42.50 kg/m²

## 2025-02-18 NOTE — PROCEDURES
PROCEDURE NOTE  Date: 2/18/2025   Name: Tay Conklin  YOB: 1960    Procedures        Reading Physician Reading Date Result Priority   Marquis Roblero MD  [phone] 2/18/2025    Mckenna Cain PA  [phone] 2/18/2025      Narrative & Impression  Title: Ultrasound guided paracentesis.       History: 64-year-old morbidly obese male with severe COPD, ARF, acute hypoxic  respiratory failure, LILA, atrial fibrillation, polycythemia, renal mass, and  cirrhosis with ascites.     :  Maggie Cain PA-C     Supervising Physician: Dr. Marquis Roblero MD. Supervising physician attests  that he was immediately available to supervise entire procedure. He reviewed the  permanently stored images and agrees with the report as written.     Consent:  Informed written and oral consent was obtained from the patient after  the risks and benefits were discussed.  All questions were answered and the  patient requested that we proceed.     Procedure:  Maximal sterile barrier technique was used.  Following routine prep  and drape of the abdomen, a local field block with 1% lidocaine was achieved.   Ultrasound evaluation was performed.     Fluid was identified in the peritoneal cavity.  Using a #11 blade, a small skin  incision was made on the abdomen.  Using real-time ultrasound guidance, the  peritoneal cavity was accessed with an 8 Nepalese centesis catheter.  The catheter  was connected to wall suction.     A total of 5500 cc of thin jessa-colored fluid was removed. The catheter was  removed and a bandage applied.     Complications: None.     Findings: Large volume ascites.     IMPRESSION:  Uncomplicated ultrasound guided paracentesis.

## 2025-02-18 NOTE — OR NURSING
Interventional Radiology Post Paracentesis/Thoracentesis Note    2/18/2025    Procedure(s): Ultrasound guided Diagnostic Paracentesis Performed with 8 Divehi catheter total volume 5500 ml.     Samples sent to lab.    Preliminary Findings: moderate clear and yellow.    Complications: None    Plan:  Observation, check labs if drawn.          Chest X-Ray:  no    Full dictated report to follow

## 2025-02-19 ENCOUNTER — APPOINTMENT (OUTPATIENT)
Dept: GENERAL RADIOLOGY | Age: 65
DRG: 871 | End: 2025-02-19
Payer: COMMERCIAL

## 2025-02-19 PROBLEM — R06.02 SHORTNESS OF BREATH: Status: ACTIVE | Noted: 2025-02-19

## 2025-02-19 LAB
ALBUMIN SERPL-MCNC: 2.6 G/DL (ref 3.2–4.6)
ALBUMIN/GLOB SERPL: 1 (ref 1–1.9)
ALP SERPL-CCNC: 81 U/L (ref 40–129)
ALT SERPL-CCNC: 12 U/L (ref 8–55)
ANION GAP SERPL CALC-SCNC: 7 MMOL/L (ref 7–16)
APPEARANCE FLD: NORMAL
APTT PPP: 148 SEC (ref 23.3–37.4)
APTT PPP: 166.2 SEC (ref 23.3–37.4)
APTT PPP: 37.9 SEC (ref 23.3–37.4)
ARTERIAL PATENCY WRIST A: POSITIVE
ARTERIAL PATENCY WRIST A: POSITIVE
AST SERPL-CCNC: 15 U/L (ref 15–37)
BASE EXCESS BLD CALC-SCNC: 9.8 MMOL/L
BASE EXCESS BLDV CALC-SCNC: 7.9 MMOL/L
BDY SITE: ABNORMAL
BDY SITE: ABNORMAL
BILIRUB SERPL-MCNC: 0.5 MG/DL (ref 0–1.2)
BUN SERPL-MCNC: 54 MG/DL (ref 8–23)
CALCIUM SERPL-MCNC: 8.3 MG/DL (ref 8.8–10.2)
CHLORIDE SERPL-SCNC: 97 MMOL/L (ref 98–107)
CO2 SERPL-SCNC: 35 MMOL/L (ref 20–29)
COLOR FLD: NORMAL
CREAT SERPL-MCNC: 2.08 MG/DL (ref 0.8–1.3)
CYTOLOGY-NON GYN: NORMAL
ERYTHROCYTE [DISTWIDTH] IN BLOOD BY AUTOMATED COUNT: 20.6 % (ref 11.9–14.6)
GAS FLOW.O2 O2 DELIVERY SYS: ABNORMAL
GAS FLOW.O2 O2 DELIVERY SYS: ABNORMAL
GLOBULIN SER CALC-MCNC: 2.7 G/DL (ref 2.3–3.5)
GLUCOSE SERPL-MCNC: 137 MG/DL (ref 70–99)
HCO3 BLD-SCNC: 39.7 MMOL/L (ref 22–26)
HCO3 BLDV-SCNC: 39.9 MMOL/L (ref 23–28)
HCT VFR BLD AUTO: 42.2 % (ref 41.1–50.3)
HGB BLD-MCNC: 12.4 G/DL (ref 13.6–17.2)
INR PPP: 1.8
IPAP/PIP/HIGH PEEP: 21
IPAP/PIP/HIGH PEEP: 22
MAGNESIUM SERPL-MCNC: 2.4 MG/DL (ref 1.8–2.4)
MCH RBC QN AUTO: 26.4 PG (ref 26.1–32.9)
MCHC RBC AUTO-ENTMCNC: 29.4 G/DL (ref 31.4–35)
MCV RBC AUTO: 89.8 FL (ref 82–102)
NRBC # BLD: 0 K/UL (ref 0–0.2)
NUC CELL # FLD: <100 /CU MM
O2/TOTAL GAS SETTING VFR VENT: 100 %
O2/TOTAL GAS SETTING VFR VENT: 100 %
PCO2 BLD: 78.7 MMHG (ref 35–45)
PCO2 BLDV: 95.3 MMHG (ref 41–51)
PH BLD: 7.31 (ref 7.35–7.45)
PH BLDV: 7.23 (ref 7.32–7.42)
PHOSPHATE SERPL-MCNC: 5 MG/DL (ref 2.5–4.5)
PLATELET # BLD AUTO: 406 K/UL (ref 150–450)
PMV BLD AUTO: 8.6 FL (ref 9.4–12.3)
PO2 BLD: 67 MMHG (ref 75–100)
PO2 BLDV: 37 MMHG
POTASSIUM SERPL-SCNC: 4.7 MMOL/L (ref 3.5–5.1)
PRESSURE SUPPORT SETTING VENT: 12 CMH2O
PRESSURE SUPPORT SETTING VENT: 12 CMH2O
PROT SERPL-MCNC: 5.3 G/DL (ref 6.3–8.2)
PROTHROMBIN TIME: 21 SEC (ref 11.3–14.9)
RBC # BLD AUTO: 4.7 M/UL (ref 4.23–5.6)
RBC # FLD: NORMAL /CU MM
RESPIRATORY RATE, POC: 26 (ref 5–40)
RESPIRATORY RATE, POC: 28 (ref 5–40)
SAO2 % BLD: 89.7 % (ref 95–98)
SAO2 % BLDV: 55.8 % (ref 65–88)
SERVICE CMNT-IMP: ABNORMAL
SODIUM SERPL-SCNC: 139 MMOL/L (ref 136–145)
SPECIMEN SOURCE FLD: NORMAL
SPECIMEN SOURCE: NORMAL
SPECIMEN TYPE: ABNORMAL
SPECIMEN TYPE: ABNORMAL
VENTILATION MODE VENT: ABNORMAL
VENTILATION MODE VENT: ABNORMAL
WBC # BLD AUTO: 13.4 K/UL (ref 4.3–11.1)

## 2025-02-19 PROCEDURE — 83735 ASSAY OF MAGNESIUM: CPT

## 2025-02-19 PROCEDURE — 6360000002 HC RX W HCPCS: Performed by: INTERNAL MEDICINE

## 2025-02-19 PROCEDURE — 6370000000 HC RX 637 (ALT 250 FOR IP): Performed by: INTERNAL MEDICINE

## 2025-02-19 PROCEDURE — 85027 COMPLETE CBC AUTOMATED: CPT

## 2025-02-19 PROCEDURE — 97535 SELF CARE MNGMENT TRAINING: CPT

## 2025-02-19 PROCEDURE — 36600 WITHDRAWAL OF ARTERIAL BLOOD: CPT

## 2025-02-19 PROCEDURE — 99291 CRITICAL CARE FIRST HOUR: CPT | Performed by: INTERNAL MEDICINE

## 2025-02-19 PROCEDURE — 2580000003 HC RX 258: Performed by: INTERNAL MEDICINE

## 2025-02-19 PROCEDURE — 2500000003 HC RX 250 WO HCPCS: Performed by: INTERNAL MEDICINE

## 2025-02-19 PROCEDURE — 85610 PROTHROMBIN TIME: CPT

## 2025-02-19 PROCEDURE — 36415 COLL VENOUS BLD VENIPUNCTURE: CPT

## 2025-02-19 PROCEDURE — 97165 OT EVAL LOW COMPLEX 30 MIN: CPT

## 2025-02-19 PROCEDURE — 6370000000 HC RX 637 (ALT 250 FOR IP)

## 2025-02-19 PROCEDURE — 85730 THROMBOPLASTIN TIME PARTIAL: CPT

## 2025-02-19 PROCEDURE — 6360000002 HC RX W HCPCS

## 2025-02-19 PROCEDURE — 82803 BLOOD GASES ANY COMBINATION: CPT

## 2025-02-19 PROCEDURE — 99233 SBSQ HOSP IP/OBS HIGH 50: CPT | Performed by: INTERNAL MEDICINE

## 2025-02-19 PROCEDURE — 94761 N-INVAS EAR/PLS OXIMETRY MLT: CPT

## 2025-02-19 PROCEDURE — 2500000003 HC RX 250 WO HCPCS

## 2025-02-19 PROCEDURE — 2700000000 HC OXYGEN THERAPY PER DAY

## 2025-02-19 PROCEDURE — 94640 AIRWAY INHALATION TREATMENT: CPT

## 2025-02-19 PROCEDURE — 97112 NEUROMUSCULAR REEDUCATION: CPT

## 2025-02-19 PROCEDURE — 99222 1ST HOSP IP/OBS MODERATE 55: CPT | Performed by: INTERNAL MEDICINE

## 2025-02-19 PROCEDURE — 84100 ASSAY OF PHOSPHORUS: CPT

## 2025-02-19 PROCEDURE — 97161 PT EVAL LOW COMPLEX 20 MIN: CPT

## 2025-02-19 PROCEDURE — 2000000000 HC ICU R&B

## 2025-02-19 PROCEDURE — 97530 THERAPEUTIC ACTIVITIES: CPT

## 2025-02-19 PROCEDURE — 80053 COMPREHEN METABOLIC PANEL: CPT

## 2025-02-19 PROCEDURE — 94660 CPAP INITIATION&MGMT: CPT

## 2025-02-19 PROCEDURE — 71045 X-RAY EXAM CHEST 1 VIEW: CPT

## 2025-02-19 PROCEDURE — 2580000003 HC RX 258

## 2025-02-19 RX ORDER — DEXMEDETOMIDINE HYDROCHLORIDE 4 UG/ML
.1-1.5 INJECTION, SOLUTION INTRAVENOUS CONTINUOUS
Status: DISCONTINUED | OUTPATIENT
Start: 2025-02-19 | End: 2025-02-22

## 2025-02-19 RX ORDER — METOPROLOL TARTRATE 1 MG/ML
5 INJECTION, SOLUTION INTRAVENOUS ONCE
Status: COMPLETED | OUTPATIENT
Start: 2025-02-19 | End: 2025-02-19

## 2025-02-19 RX ORDER — AMIODARONE HYDROCHLORIDE 200 MG/1
100 TABLET ORAL
Status: COMPLETED | OUTPATIENT
Start: 2025-02-19 | End: 2025-02-19

## 2025-02-19 RX ADMIN — SODIUM CHLORIDE, PRESERVATIVE FREE 10 ML: 5 INJECTION INTRAVENOUS at 11:00

## 2025-02-19 RX ADMIN — METOROPROLOL TARTRATE 5 MG: 5 INJECTION, SOLUTION INTRAVENOUS at 23:35

## 2025-02-19 RX ADMIN — DEXMEDETOMIDINE 0.2 MCG/KG/HR: 100 INJECTION, SOLUTION INTRAVENOUS at 02:04

## 2025-02-19 RX ADMIN — HEPARIN SODIUM 4000 UNITS: 1000 INJECTION INTRAVENOUS; SUBCUTANEOUS at 23:33

## 2025-02-19 RX ADMIN — IPRATROPIUM BROMIDE AND ALBUTEROL SULFATE 1 DOSE: 2.5; .5 SOLUTION RESPIRATORY (INHALATION) at 20:01

## 2025-02-19 RX ADMIN — IPRATROPIUM BROMIDE AND ALBUTEROL SULFATE 1 DOSE: 2.5; .5 SOLUTION RESPIRATORY (INHALATION) at 11:24

## 2025-02-19 RX ADMIN — BUDESONIDE INHALATION 500 MCG: 0.5 SUSPENSION RESPIRATORY (INHALATION) at 20:01

## 2025-02-19 RX ADMIN — SODIUM CHLORIDE 1 MG: 9 INJECTION INTRAMUSCULAR; INTRAVENOUS; SUBCUTANEOUS at 21:56

## 2025-02-19 RX ADMIN — IPRATROPIUM BROMIDE AND ALBUTEROL SULFATE 1 DOSE: 2.5; .5 SOLUTION RESPIRATORY (INHALATION) at 07:23

## 2025-02-19 RX ADMIN — SODIUM CHLORIDE, PRESERVATIVE FREE 10 ML: 5 INJECTION INTRAVENOUS at 09:00

## 2025-02-19 RX ADMIN — SODIUM CHLORIDE, PRESERVATIVE FREE 10 ML: 5 INJECTION INTRAVENOUS at 19:20

## 2025-02-19 RX ADMIN — FERROUS SULFATE TAB 325 MG (65 MG ELEMENTAL FE) 325 MG: 325 (65 FE) TAB at 16:31

## 2025-02-19 RX ADMIN — AMIODARONE HYDROCHLORIDE 100 MG: 200 TABLET ORAL at 19:59

## 2025-02-19 RX ADMIN — BUDESONIDE INHALATION 500 MCG: 0.5 SUSPENSION RESPIRATORY (INHALATION) at 07:23

## 2025-02-19 RX ADMIN — CEFTRIAXONE SODIUM 2000 MG: 2 INJECTION, POWDER, FOR SOLUTION INTRAMUSCULAR; INTRAVENOUS at 16:33

## 2025-02-19 RX ADMIN — Medication 6 MG: at 21:56

## 2025-02-19 RX ADMIN — IPRATROPIUM BROMIDE AND ALBUTEROL SULFATE 1 DOSE: 2.5; .5 SOLUTION RESPIRATORY (INHALATION) at 15:14

## 2025-02-19 ASSESSMENT — PAIN SCALES - GENERAL
PAINLEVEL_OUTOF10: 0

## 2025-02-19 NOTE — THERAPY EVALUATION
COLLABORATION:  RN/ PCT, PT/ PTA, and OT/ SMITH    EDUCATION:  Education Given To: Patient  Education Provided: Role of Therapy;Plan of Care  Education Method: Verbal  Education Outcome: Verbalized understanding    TOTAL TREATMENT DURATION AND TIME:  Time In: 1407  Time Out: 1451  Minutes: 44    CRYSTAL COCHRAN, OT

## 2025-02-19 NOTE — INTERDISCIPLINARY ROUNDS
Multi-D Rounds/Checklist (leapfrog):  Lines: can any be removed?: None    External Urinary Catheter (Active)      DVT Prophylaxis: Ordered  Vent: N/A  Nutrition Ordered/appropriate: Ordered  Can antibiotics or other drugs be stopped? No End Date set Yes   MRSA swab: NA  Inpat Anti-Infectives (From admission, onward)       Start     Ordered Stop    02/18/25 1400  cefTRIAXone (ROCEPHIN) 2,000 mg in sodium chloride 0.9 % 50 mL IVPB (mini-bag)  2,000 mg,   IntraVENous,   EVERY 24 HOURS         02/17/25 1550 02/24/25 1359                  Consults needed:  GI  A: Is pain control adequate? (has PRNs? Stop drip?) Yes  B: Sedation break and SBT? Yes  C: Is sedation choice appropriate? Yes  D: Delirium/CAM-ICU? Yes  E: Mobility goals/appropriateness? Yes  F: Family update and plan? children are surrogate decision makers and are being updated daily by primary attending and nursing staff.    Monique Ng, APRN - CNP

## 2025-02-20 LAB
ALBUMIN SERPL-MCNC: 2.6 G/DL (ref 3.2–4.6)
ALBUMIN/GLOB SERPL: 0.9 (ref 1–1.9)
ALP SERPL-CCNC: 75 U/L (ref 40–129)
ALT SERPL-CCNC: 13 U/L (ref 8–55)
AMMONIA PLAS-SCNC: 48 UMOL/L (ref 16–60)
ANION GAP SERPL CALC-SCNC: 8 MMOL/L (ref 7–16)
APTT PPP: 143.8 SEC (ref 23.3–37.4)
APTT PPP: 62.5 SEC (ref 23.3–37.4)
APTT PPP: 97.6 SEC (ref 23.3–37.4)
ARTERIAL PATENCY WRIST A: ABNORMAL
ARTERIAL PATENCY WRIST A: ABNORMAL
AST SERPL-CCNC: 22 U/L (ref 15–37)
BACTERIA SPEC CULT: NORMAL
BASE EXCESS BLD CALC-SCNC: 10.8 MMOL/L
BASE EXCESS BLD CALC-SCNC: 11.9 MMOL/L
BASOPHILS # BLD: 0.08 K/UL (ref 0–0.2)
BASOPHILS NFR BLD: 0.6 % (ref 0–2)
BDY SITE: ABNORMAL
BDY SITE: ABNORMAL
BILIRUB SERPL-MCNC: 0.7 MG/DL (ref 0–1.2)
BUN SERPL-MCNC: 48 MG/DL (ref 8–23)
CALCIUM SERPL-MCNC: 8.4 MG/DL (ref 8.8–10.2)
CHLORIDE SERPL-SCNC: 96 MMOL/L (ref 98–107)
CO2 SERPL-SCNC: 31 MMOL/L (ref 20–29)
CREAT SERPL-MCNC: 1.8 MG/DL (ref 0.8–1.3)
DIFFERENTIAL METHOD BLD: ABNORMAL
EOSINOPHIL # BLD: 0.51 K/UL (ref 0–0.8)
EOSINOPHIL NFR BLD: 3.6 % (ref 0.5–7.8)
ERYTHROCYTE [DISTWIDTH] IN BLOOD BY AUTOMATED COUNT: 20.6 % (ref 11.9–14.6)
GAS FLOW.O2 O2 DELIVERY SYS: ABNORMAL
GAS FLOW.O2 O2 DELIVERY SYS: ABNORMAL
GLOBULIN SER CALC-MCNC: 2.7 G/DL (ref 2.3–3.5)
GLUCOSE SERPL-MCNC: 107 MG/DL (ref 70–99)
GRAM STN SPEC: NORMAL
GRAM STN SPEC: NORMAL
HCO3 BLD-SCNC: 38.6 MMOL/L (ref 22–26)
HCO3 BLD-SCNC: 39.5 MMOL/L (ref 22–26)
HCT VFR BLD AUTO: 38.8 % (ref 41.1–50.3)
HGB BLD-MCNC: 11.7 G/DL (ref 13.6–17.2)
IMM GRANULOCYTES # BLD AUTO: 0.06 K/UL (ref 0–0.5)
IMM GRANULOCYTES NFR BLD AUTO: 0.4 % (ref 0–5)
INR PPP: 1.9
INSPIRATION.DURATION SETTING TIME VENT: 0.9 SEC
IPAP/PIP/HIGH PEEP: 22
LYMPHOCYTES # BLD: 1.17 K/UL (ref 0.5–4.6)
LYMPHOCYTES NFR BLD: 8.3 % (ref 13–44)
MAGNESIUM SERPL-MCNC: 2.2 MG/DL (ref 1.8–2.4)
MCH RBC QN AUTO: 26.3 PG (ref 26.1–32.9)
MCHC RBC AUTO-ENTMCNC: 30.2 G/DL (ref 31.4–35)
MCV RBC AUTO: 87.2 FL (ref 82–102)
MONOCYTES # BLD: 1.61 K/UL (ref 0.1–1.3)
MONOCYTES NFR BLD: 11.5 % (ref 4–12)
NEUTS SEG # BLD: 10.61 K/UL (ref 1.7–8.2)
NEUTS SEG NFR BLD: 75.6 % (ref 43–78)
NRBC # BLD: 0 K/UL (ref 0–0.2)
O2/TOTAL GAS SETTING VFR VENT: 100 %
O2/TOTAL GAS SETTING VFR VENT: 35 %
PCO2 BLD: 61.7 MMHG (ref 35–45)
PCO2 BLD: 62.4 MMHG (ref 35–45)
PEEP RESPIRATORY: 10 CMH2O
PEEP RESPIRATORY: 10 CMH2O
PH BLD: 7.4 (ref 7.35–7.45)
PH BLD: 7.41 (ref 7.35–7.45)
PHOSPHATE SERPL-MCNC: 3.1 MG/DL (ref 2.5–4.5)
PLATELET # BLD AUTO: 403 K/UL (ref 150–450)
PMV BLD AUTO: 9.3 FL (ref 9.4–12.3)
PO2 BLD: 128 MMHG (ref 75–100)
PO2 BLD: 52 MMHG (ref 75–100)
POTASSIUM SERPL-SCNC: 4.3 MMOL/L (ref 3.5–5.1)
PRESSURE SUPPORT SETTING VENT: 12 CMH2O
PRESSURE SUPPORT SETTING VENT: 22 CMH2O
PROT SERPL-MCNC: 5.3 G/DL (ref 6.3–8.2)
PROTHROMBIN TIME: 21.7 SEC (ref 11.3–14.9)
RBC # BLD AUTO: 4.45 M/UL (ref 4.23–5.6)
RESPIRATORY RATE, POC: 27 (ref 5–40)
RESPIRATORY RATE, POC: 30 (ref 5–40)
SAO2 % BLD: 84.9 % (ref 95–98)
SAO2 % BLD: 98.8 % (ref 95–98)
SERVICE CMNT-IMP: ABNORMAL
SERVICE CMNT-IMP: NORMAL
SODIUM SERPL-SCNC: 136 MMOL/L (ref 136–145)
SPECIMEN TYPE: ABNORMAL
SPECIMEN TYPE: ABNORMAL
UFH PPP CHRO-ACNC: 1.07 IU/ML (ref 0.3–0.7)
VENTILATION MODE VENT: ABNORMAL
VENTILATION MODE VENT: ABNORMAL
WBC # BLD AUTO: 14 K/UL (ref 4.3–11.1)

## 2025-02-20 PROCEDURE — 6370000000 HC RX 637 (ALT 250 FOR IP)

## 2025-02-20 PROCEDURE — 2500000003 HC RX 250 WO HCPCS: Performed by: INTERNAL MEDICINE

## 2025-02-20 PROCEDURE — 6360000002 HC RX W HCPCS: Performed by: INTERNAL MEDICINE

## 2025-02-20 PROCEDURE — 6370000000 HC RX 637 (ALT 250 FOR IP): Performed by: STUDENT IN AN ORGANIZED HEALTH CARE EDUCATION/TRAINING PROGRAM

## 2025-02-20 PROCEDURE — 2500000003 HC RX 250 WO HCPCS

## 2025-02-20 PROCEDURE — 6360000002 HC RX W HCPCS

## 2025-02-20 PROCEDURE — 85520 HEPARIN ASSAY: CPT

## 2025-02-20 PROCEDURE — 94660 CPAP INITIATION&MGMT: CPT

## 2025-02-20 PROCEDURE — 85025 COMPLETE CBC W/AUTO DIFF WBC: CPT

## 2025-02-20 PROCEDURE — 36415 COLL VENOUS BLD VENIPUNCTURE: CPT

## 2025-02-20 PROCEDURE — 2000000000 HC ICU R&B

## 2025-02-20 PROCEDURE — 85730 THROMBOPLASTIN TIME PARTIAL: CPT

## 2025-02-20 PROCEDURE — 36600 WITHDRAWAL OF ARTERIAL BLOOD: CPT

## 2025-02-20 PROCEDURE — 82140 ASSAY OF AMMONIA: CPT

## 2025-02-20 PROCEDURE — 2580000003 HC RX 258: Performed by: INTERNAL MEDICINE

## 2025-02-20 PROCEDURE — 94640 AIRWAY INHALATION TREATMENT: CPT

## 2025-02-20 PROCEDURE — 2580000003 HC RX 258

## 2025-02-20 PROCEDURE — 83735 ASSAY OF MAGNESIUM: CPT

## 2025-02-20 PROCEDURE — 99291 CRITICAL CARE FIRST HOUR: CPT | Performed by: INTERNAL MEDICINE

## 2025-02-20 PROCEDURE — 82803 BLOOD GASES ANY COMBINATION: CPT

## 2025-02-20 PROCEDURE — 84100 ASSAY OF PHOSPHORUS: CPT

## 2025-02-20 PROCEDURE — 6370000000 HC RX 637 (ALT 250 FOR IP): Performed by: INTERNAL MEDICINE

## 2025-02-20 PROCEDURE — 85610 PROTHROMBIN TIME: CPT

## 2025-02-20 PROCEDURE — 80053 COMPREHEN METABOLIC PANEL: CPT

## 2025-02-20 RX ORDER — OLANZAPINE 5 MG/1
2.5 TABLET, ORALLY DISINTEGRATING ORAL 2 TIMES DAILY
Status: DISCONTINUED | OUTPATIENT
Start: 2025-02-20 | End: 2025-02-20

## 2025-02-20 RX ORDER — OLANZAPINE 5 MG/1
2.5 TABLET, ORALLY DISINTEGRATING ORAL 2 TIMES DAILY
Status: DISCONTINUED | OUTPATIENT
Start: 2025-02-20 | End: 2025-02-23

## 2025-02-20 RX ORDER — MIDODRINE HYDROCHLORIDE 5 MG/1
5 TABLET ORAL
Status: DISCONTINUED | OUTPATIENT
Start: 2025-02-20 | End: 2025-02-24

## 2025-02-20 RX ORDER — DIPHENHYDRAMINE HYDROCHLORIDE 50 MG/ML
25 INJECTION INTRAMUSCULAR; INTRAVENOUS EVERY 6 HOURS PRN
Status: DISCONTINUED | OUTPATIENT
Start: 2025-02-20 | End: 2025-02-27

## 2025-02-20 RX ADMIN — FERROUS SULFATE TAB 325 MG (65 MG ELEMENTAL FE) 325 MG: 325 (65 FE) TAB at 16:45

## 2025-02-20 RX ADMIN — SODIUM CHLORIDE, PRESERVATIVE FREE 10 ML: 5 INJECTION INTRAVENOUS at 22:57

## 2025-02-20 RX ADMIN — HEPARIN SODIUM 11 UNITS/KG/HR: 10000 INJECTION, SOLUTION INTRAVENOUS at 19:17

## 2025-02-20 RX ADMIN — IPRATROPIUM BROMIDE AND ALBUTEROL SULFATE 1 DOSE: 2.5; .5 SOLUTION RESPIRATORY (INHALATION) at 11:10

## 2025-02-20 RX ADMIN — ASPIRIN 81 MG: 81 TABLET, CHEWABLE ORAL at 13:14

## 2025-02-20 RX ADMIN — IPRATROPIUM BROMIDE AND ALBUTEROL SULFATE 1 DOSE: 2.5; .5 SOLUTION RESPIRATORY (INHALATION) at 15:23

## 2025-02-20 RX ADMIN — Medication 6 MG: at 22:57

## 2025-02-20 RX ADMIN — WATER 10 MG: 1 INJECTION INTRAMUSCULAR; INTRAVENOUS; SUBCUTANEOUS at 01:55

## 2025-02-20 RX ADMIN — EMPAGLIFLOZIN 10 MG: 10 TABLET, FILM COATED ORAL at 13:14

## 2025-02-20 RX ADMIN — OLANZAPINE 2.5 MG: 5 TABLET, ORALLY DISINTEGRATING ORAL at 16:45

## 2025-02-20 RX ADMIN — DEXMEDETOMIDINE 0.2 MCG/KG/HR: 100 INJECTION, SOLUTION INTRAVENOUS at 02:50

## 2025-02-20 RX ADMIN — SODIUM CHLORIDE 1 MG: 9 INJECTION INTRAMUSCULAR; INTRAVENOUS; SUBCUTANEOUS at 02:46

## 2025-02-20 RX ADMIN — MIDODRINE HYDROCHLORIDE 5 MG: 5 TABLET ORAL at 16:45

## 2025-02-20 RX ADMIN — BUDESONIDE INHALATION 500 MCG: 0.5 SUSPENSION RESPIRATORY (INHALATION) at 07:23

## 2025-02-20 RX ADMIN — IPRATROPIUM BROMIDE AND ALBUTEROL SULFATE 1 DOSE: 2.5; .5 SOLUTION RESPIRATORY (INHALATION) at 07:23

## 2025-02-20 RX ADMIN — HEPARIN SODIUM 9 UNITS/KG/HR: 10000 INJECTION, SOLUTION INTRAVENOUS at 00:23

## 2025-02-20 RX ADMIN — IPRATROPIUM BROMIDE AND ALBUTEROL SULFATE 1 DOSE: 2.5; .5 SOLUTION RESPIRATORY (INHALATION) at 19:38

## 2025-02-20 RX ADMIN — AMIODARONE HYDROCHLORIDE 100 MG: 200 TABLET ORAL at 13:14

## 2025-02-20 RX ADMIN — CEFTRIAXONE SODIUM 2000 MG: 2 INJECTION, POWDER, FOR SOLUTION INTRAMUSCULAR; INTRAVENOUS at 15:05

## 2025-02-20 RX ADMIN — HEPARIN SODIUM 2000 UNITS: 1000 INJECTION INTRAVENOUS; SUBCUTANEOUS at 15:18

## 2025-02-20 RX ADMIN — DIPHENHYDRAMINE HYDROCHLORIDE 25 MG: 50 INJECTION INTRAMUSCULAR; INTRAVENOUS at 01:28

## 2025-02-20 RX ADMIN — FERROUS SULFATE TAB 325 MG (65 MG ELEMENTAL FE) 325 MG: 325 (65 FE) TAB at 11:41

## 2025-02-20 RX ADMIN — BUDESONIDE INHALATION 500 MCG: 0.5 SUSPENSION RESPIRATORY (INHALATION) at 19:38

## 2025-02-20 ASSESSMENT — PAIN SCALES - GENERAL
PAINLEVEL_OUTOF10: 0

## 2025-02-20 NOTE — INTERDISCIPLINARY ROUNDS
Multi-D Rounds/Checklist (leapfrog):  Lines: can any be removed?: None    External Urinary Catheter (Active)      DVT Prophylaxis: Ordered  Vent: N/A  Nutrition Ordered/appropriate: Ordered  Can antibiotics or other drugs be stopped? No End Date set Yes   MRSA swab: NA  Inpat Anti-Infectives (From admission, onward)       Start     Ordered Stop    02/18/25 1400  cefTRIAXone (ROCEPHIN) 2,000 mg in sodium chloride 0.9 % 50 mL IVPB (mini-bag)  2,000 mg,   IntraVENous,   EVERY 24 HOURS         02/17/25 1550 02/24/25 1359                  Consults needed: None  A: Is pain control adequate? (has PRNs? Stop drip?) Yes  B: Sedation break and SBT? Yes  C: Is sedation choice appropriate? Orders adjusted  D: Delirium/CAM-ICU? Yes  E: Mobility goals/appropriateness? Yes  F: Family update and plan? daughter is surrogate decision maker and is being updated daily by primary attending and nursing staff.    Monique Ng, APRN - CNP

## 2025-02-21 PROBLEM — J44.1 COPD EXACERBATION (HCC): Status: ACTIVE | Noted: 2025-02-21

## 2025-02-21 LAB
ALBUMIN SERPL-MCNC: 2.7 G/DL (ref 3.2–4.6)
ANION GAP SERPL CALC-SCNC: 9 MMOL/L (ref 7–16)
APTT PPP: 43.5 SEC (ref 23.3–37.4)
APTT PPP: 78.7 SEC (ref 23.3–37.4)
BUN SERPL-MCNC: 39 MG/DL (ref 8–23)
CALCIUM SERPL-MCNC: 8.7 MG/DL (ref 8.8–10.2)
CHLORIDE SERPL-SCNC: 96 MMOL/L (ref 98–107)
CO2 SERPL-SCNC: 33 MMOL/L (ref 20–29)
CREAT SERPL-MCNC: 1.55 MG/DL (ref 0.8–1.3)
ERYTHROCYTE [DISTWIDTH] IN BLOOD BY AUTOMATED COUNT: 21.1 % (ref 11.9–14.6)
GLUCOSE SERPL-MCNC: 106 MG/DL (ref 70–99)
HCT VFR BLD AUTO: 40.7 % (ref 41.1–50.3)
HGB BLD-MCNC: 12.2 G/DL (ref 13.6–17.2)
MCH RBC QN AUTO: 26.2 PG (ref 26.1–32.9)
MCHC RBC AUTO-ENTMCNC: 30 G/DL (ref 31.4–35)
MCV RBC AUTO: 87.5 FL (ref 82–102)
NRBC # BLD: 0 K/UL (ref 0–0.2)
PHOSPHATE SERPL-MCNC: 3 MG/DL (ref 2.5–4.5)
PLATELET # BLD AUTO: 416 K/UL (ref 150–450)
PMV BLD AUTO: 9.1 FL (ref 9.4–12.3)
POTASSIUM SERPL-SCNC: 4.5 MMOL/L (ref 3.5–5.1)
RBC # BLD AUTO: 4.65 M/UL (ref 4.23–5.6)
SODIUM SERPL-SCNC: 138 MMOL/L (ref 136–145)
WBC # BLD AUTO: 12.3 K/UL (ref 4.3–11.1)

## 2025-02-21 PROCEDURE — 6370000000 HC RX 637 (ALT 250 FOR IP): Performed by: STUDENT IN AN ORGANIZED HEALTH CARE EDUCATION/TRAINING PROGRAM

## 2025-02-21 PROCEDURE — 6370000000 HC RX 637 (ALT 250 FOR IP): Performed by: INTERNAL MEDICINE

## 2025-02-21 PROCEDURE — 94640 AIRWAY INHALATION TREATMENT: CPT

## 2025-02-21 PROCEDURE — 2500000003 HC RX 250 WO HCPCS: Performed by: INTERNAL MEDICINE

## 2025-02-21 PROCEDURE — 6360000002 HC RX W HCPCS: Performed by: STUDENT IN AN ORGANIZED HEALTH CARE EDUCATION/TRAINING PROGRAM

## 2025-02-21 PROCEDURE — 2000000000 HC ICU R&B

## 2025-02-21 PROCEDURE — 94660 CPAP INITIATION&MGMT: CPT

## 2025-02-21 PROCEDURE — 85027 COMPLETE CBC AUTOMATED: CPT

## 2025-02-21 PROCEDURE — 94761 N-INVAS EAR/PLS OXIMETRY MLT: CPT

## 2025-02-21 PROCEDURE — 6360000002 HC RX W HCPCS: Performed by: INTERNAL MEDICINE

## 2025-02-21 PROCEDURE — 99232 SBSQ HOSP IP/OBS MODERATE 35: CPT | Performed by: INTERNAL MEDICINE

## 2025-02-21 PROCEDURE — 80069 RENAL FUNCTION PANEL: CPT

## 2025-02-21 PROCEDURE — 97535 SELF CARE MNGMENT TRAINING: CPT

## 2025-02-21 PROCEDURE — 36415 COLL VENOUS BLD VENIPUNCTURE: CPT

## 2025-02-21 PROCEDURE — 2700000000 HC OXYGEN THERAPY PER DAY

## 2025-02-21 PROCEDURE — 2580000003 HC RX 258: Performed by: INTERNAL MEDICINE

## 2025-02-21 PROCEDURE — 5A09457 ASSISTANCE WITH RESPIRATORY VENTILATION, 24-96 CONSECUTIVE HOURS, CONTINUOUS POSITIVE AIRWAY PRESSURE: ICD-10-PCS | Performed by: INTERNAL MEDICINE

## 2025-02-21 PROCEDURE — 99232 SBSQ HOSP IP/OBS MODERATE 35: CPT | Performed by: STUDENT IN AN ORGANIZED HEALTH CARE EDUCATION/TRAINING PROGRAM

## 2025-02-21 PROCEDURE — 97530 THERAPEUTIC ACTIVITIES: CPT

## 2025-02-21 PROCEDURE — 85730 THROMBOPLASTIN TIME PARTIAL: CPT

## 2025-02-21 PROCEDURE — 97112 NEUROMUSCULAR REEDUCATION: CPT

## 2025-02-21 RX ORDER — METOPROLOL SUCCINATE 50 MG/1
50 TABLET, EXTENDED RELEASE ORAL DAILY
Status: DISCONTINUED | OUTPATIENT
Start: 2025-02-21 | End: 2025-03-02 | Stop reason: HOSPADM

## 2025-02-21 RX ORDER — FUROSEMIDE 10 MG/ML
40 INJECTION INTRAMUSCULAR; INTRAVENOUS 2 TIMES DAILY
Status: DISCONTINUED | OUTPATIENT
Start: 2025-02-21 | End: 2025-02-24

## 2025-02-21 RX ADMIN — MIDODRINE HYDROCHLORIDE 5 MG: 5 TABLET ORAL at 11:14

## 2025-02-21 RX ADMIN — FERROUS SULFATE TAB 325 MG (65 MG ELEMENTAL FE) 325 MG: 325 (65 FE) TAB at 08:56

## 2025-02-21 RX ADMIN — BUDESONIDE INHALATION 500 MCG: 0.5 SUSPENSION RESPIRATORY (INHALATION) at 08:30

## 2025-02-21 RX ADMIN — MIDODRINE HYDROCHLORIDE 5 MG: 5 TABLET ORAL at 17:04

## 2025-02-21 RX ADMIN — SODIUM CHLORIDE, PRESERVATIVE FREE 10 ML: 5 INJECTION INTRAVENOUS at 20:40

## 2025-02-21 RX ADMIN — CEFTRIAXONE SODIUM 2000 MG: 2 INJECTION, POWDER, FOR SOLUTION INTRAMUSCULAR; INTRAVENOUS at 16:02

## 2025-02-21 RX ADMIN — SODIUM CHLORIDE, PRESERVATIVE FREE 10 ML: 5 INJECTION INTRAVENOUS at 08:57

## 2025-02-21 RX ADMIN — OLANZAPINE 2.5 MG: 5 TABLET, ORALLY DISINTEGRATING ORAL at 20:40

## 2025-02-21 RX ADMIN — OLANZAPINE 2.5 MG: 5 TABLET, ORALLY DISINTEGRATING ORAL at 08:56

## 2025-02-21 RX ADMIN — HEPARIN SODIUM 9 UNITS/KG/HR: 10000 INJECTION, SOLUTION INTRAVENOUS at 13:05

## 2025-02-21 RX ADMIN — IPRATROPIUM BROMIDE AND ALBUTEROL SULFATE 1 DOSE: 2.5; .5 SOLUTION RESPIRATORY (INHALATION) at 11:54

## 2025-02-21 RX ADMIN — ASPIRIN 81 MG: 81 TABLET, CHEWABLE ORAL at 08:56

## 2025-02-21 RX ADMIN — IPRATROPIUM BROMIDE AND ALBUTEROL SULFATE 1 DOSE: 2.5; .5 SOLUTION RESPIRATORY (INHALATION) at 08:30

## 2025-02-21 RX ADMIN — IPRATROPIUM BROMIDE AND ALBUTEROL SULFATE 1 DOSE: 2.5; .5 SOLUTION RESPIRATORY (INHALATION) at 19:35

## 2025-02-21 RX ADMIN — EMPAGLIFLOZIN 10 MG: 10 TABLET, FILM COATED ORAL at 08:56

## 2025-02-21 RX ADMIN — METOPROLOL SUCCINATE 50 MG: 50 TABLET, EXTENDED RELEASE ORAL at 10:01

## 2025-02-21 RX ADMIN — HEPARIN SODIUM 4000 UNITS: 1000 INJECTION INTRAVENOUS; SUBCUTANEOUS at 19:05

## 2025-02-21 RX ADMIN — IPRATROPIUM BROMIDE AND ALBUTEROL SULFATE 1 DOSE: 2.5; .5 SOLUTION RESPIRATORY (INHALATION) at 15:46

## 2025-02-21 RX ADMIN — BUDESONIDE INHALATION 500 MCG: 0.5 SUSPENSION RESPIRATORY (INHALATION) at 19:35

## 2025-02-21 RX ADMIN — FUROSEMIDE 40 MG: 10 INJECTION, SOLUTION INTRAMUSCULAR; INTRAVENOUS at 17:04

## 2025-02-21 RX ADMIN — FUROSEMIDE 40 MG: 10 INJECTION, SOLUTION INTRAMUSCULAR; INTRAVENOUS at 11:14

## 2025-02-21 RX ADMIN — AMIODARONE HYDROCHLORIDE 100 MG: 200 TABLET ORAL at 08:56

## 2025-02-21 RX ADMIN — FERROUS SULFATE TAB 325 MG (65 MG ELEMENTAL FE) 325 MG: 325 (65 FE) TAB at 17:04

## 2025-02-21 RX ADMIN — MIDODRINE HYDROCHLORIDE 5 MG: 5 TABLET ORAL at 08:56

## 2025-02-21 ASSESSMENT — PAIN SCALES - GENERAL
PAINLEVEL_OUTOF10: 0

## 2025-02-21 NOTE — INTERDISCIPLINARY ROUNDS
Multi-D Rounds/Checklist (leapfrog):  Lines: can any be removed?: None    External Urinary Catheter (Active)      DVT Prophylaxis: Ordered  Vent: N/A  Nutrition Ordered/appropriate: Ordered  Can antibiotics or other drugs be stopped? Yes/End Date set Yes/No  MRSA swab:   Inpat Anti-Infectives (From admission, onward)       Start     Ordered Stop    02/18/25 1400  cefTRIAXone (ROCEPHIN) 2,000 mg in sodium chloride 0.9 % 50 mL IVPB (mini-bag)  2,000 mg,   IntraVENous,   EVERY 24 HOURS         02/17/25 1550 02/24/25 1359                  Consults needed: None  A: Is pain control adequate? (has PRNs? Stop drip?) Yes  B: Sedation break and SBT? N/A  C: Is sedation choice appropriate? N/A  D: Delirium/CAM-ICU? No  E: Mobility goals/appropriateness? Yes  F: Family update and plan? child is surrogate decision maker and is being updated daily by primary attending and nursing staff.    Luzmaria Poe, APRN - CNP

## 2025-02-22 ENCOUNTER — APPOINTMENT (OUTPATIENT)
Dept: GENERAL RADIOLOGY | Age: 65
DRG: 871 | End: 2025-02-22
Payer: COMMERCIAL

## 2025-02-22 LAB
ANION GAP SERPL CALC-SCNC: 5 MMOL/L (ref 7–16)
APTT PPP: 78.1 SEC (ref 23.3–37.4)
APTT PPP: 87.7 SEC (ref 23.3–37.4)
APTT PPP: >200 SEC (ref 23.3–37.4)
BACTERIA SPEC CULT: NORMAL
BACTERIA SPEC CULT: NORMAL
BUN SERPL-MCNC: 34 MG/DL (ref 8–23)
CALCIUM SERPL-MCNC: 8.4 MG/DL (ref 8.8–10.2)
CHLORIDE SERPL-SCNC: 98 MMOL/L (ref 98–107)
CO2 SERPL-SCNC: 38 MMOL/L (ref 20–29)
CREAT SERPL-MCNC: 1.5 MG/DL (ref 0.8–1.3)
GLUCOSE SERPL-MCNC: 94 MG/DL (ref 70–99)
POTASSIUM SERPL-SCNC: 4.3 MMOL/L (ref 3.5–5.1)
SERVICE CMNT-IMP: NORMAL
SERVICE CMNT-IMP: NORMAL
SODIUM SERPL-SCNC: 141 MMOL/L (ref 136–145)

## 2025-02-22 PROCEDURE — 2500000003 HC RX 250 WO HCPCS: Performed by: INTERNAL MEDICINE

## 2025-02-22 PROCEDURE — 94664 DEMO&/EVAL PT USE INHALER: CPT

## 2025-02-22 PROCEDURE — 94640 AIRWAY INHALATION TREATMENT: CPT

## 2025-02-22 PROCEDURE — 2580000003 HC RX 258: Performed by: INTERNAL MEDICINE

## 2025-02-22 PROCEDURE — 6370000000 HC RX 637 (ALT 250 FOR IP): Performed by: STUDENT IN AN ORGANIZED HEALTH CARE EDUCATION/TRAINING PROGRAM

## 2025-02-22 PROCEDURE — 2060000000 HC ICU INTERMEDIATE R&B

## 2025-02-22 PROCEDURE — 99232 SBSQ HOSP IP/OBS MODERATE 35: CPT | Performed by: INTERNAL MEDICINE

## 2025-02-22 PROCEDURE — 6370000000 HC RX 637 (ALT 250 FOR IP): Performed by: INTERNAL MEDICINE

## 2025-02-22 PROCEDURE — 94660 CPAP INITIATION&MGMT: CPT

## 2025-02-22 PROCEDURE — 2700000000 HC OXYGEN THERAPY PER DAY

## 2025-02-22 PROCEDURE — 6360000002 HC RX W HCPCS: Performed by: INTERNAL MEDICINE

## 2025-02-22 PROCEDURE — 6360000002 HC RX W HCPCS: Performed by: STUDENT IN AN ORGANIZED HEALTH CARE EDUCATION/TRAINING PROGRAM

## 2025-02-22 PROCEDURE — 71045 X-RAY EXAM CHEST 1 VIEW: CPT

## 2025-02-22 PROCEDURE — 80048 BASIC METABOLIC PNL TOTAL CA: CPT

## 2025-02-22 PROCEDURE — 85730 THROMBOPLASTIN TIME PARTIAL: CPT

## 2025-02-22 PROCEDURE — 36415 COLL VENOUS BLD VENIPUNCTURE: CPT

## 2025-02-22 PROCEDURE — 94761 N-INVAS EAR/PLS OXIMETRY MLT: CPT

## 2025-02-22 RX ADMIN — FERROUS SULFATE TAB 325 MG (65 MG ELEMENTAL FE) 325 MG: 325 (65 FE) TAB at 17:02

## 2025-02-22 RX ADMIN — EMPAGLIFLOZIN 10 MG: 10 TABLET, FILM COATED ORAL at 08:06

## 2025-02-22 RX ADMIN — OLANZAPINE 2.5 MG: 5 TABLET, ORALLY DISINTEGRATING ORAL at 08:06

## 2025-02-22 RX ADMIN — METOPROLOL SUCCINATE 50 MG: 50 TABLET, EXTENDED RELEASE ORAL at 08:41

## 2025-02-22 RX ADMIN — HEPARIN SODIUM 10 UNITS/KG/HR: 10000 INJECTION, SOLUTION INTRAVENOUS at 23:44

## 2025-02-22 RX ADMIN — SODIUM CHLORIDE, PRESERVATIVE FREE 10 ML: 5 INJECTION INTRAVENOUS at 20:27

## 2025-02-22 RX ADMIN — SODIUM CHLORIDE, PRESERVATIVE FREE 10 ML: 5 INJECTION INTRAVENOUS at 08:05

## 2025-02-22 RX ADMIN — OLANZAPINE 2.5 MG: 5 TABLET, ORALLY DISINTEGRATING ORAL at 20:21

## 2025-02-22 RX ADMIN — HEPARIN SODIUM 10 UNITS/KG/HR: 10000 INJECTION, SOLUTION INTRAVENOUS at 05:58

## 2025-02-22 RX ADMIN — FERROUS SULFATE TAB 325 MG (65 MG ELEMENTAL FE) 325 MG: 325 (65 FE) TAB at 08:07

## 2025-02-22 RX ADMIN — IPRATROPIUM BROMIDE AND ALBUTEROL SULFATE 1 DOSE: 2.5; .5 SOLUTION RESPIRATORY (INHALATION) at 15:00

## 2025-02-22 RX ADMIN — SODIUM CHLORIDE, PRESERVATIVE FREE 10 ML: 5 INJECTION INTRAVENOUS at 20:28

## 2025-02-22 RX ADMIN — CEFTRIAXONE SODIUM 2000 MG: 2 INJECTION, POWDER, FOR SOLUTION INTRAMUSCULAR; INTRAVENOUS at 14:42

## 2025-02-22 RX ADMIN — AMIODARONE HYDROCHLORIDE 100 MG: 200 TABLET ORAL at 08:07

## 2025-02-22 RX ADMIN — IPRATROPIUM BROMIDE AND ALBUTEROL SULFATE 1 DOSE: 2.5; .5 SOLUTION RESPIRATORY (INHALATION) at 19:18

## 2025-02-22 RX ADMIN — ASPIRIN 81 MG: 81 TABLET, CHEWABLE ORAL at 08:06

## 2025-02-22 RX ADMIN — FUROSEMIDE 40 MG: 10 INJECTION, SOLUTION INTRAMUSCULAR; INTRAVENOUS at 17:02

## 2025-02-22 RX ADMIN — FUROSEMIDE 40 MG: 10 INJECTION, SOLUTION INTRAMUSCULAR; INTRAVENOUS at 08:07

## 2025-02-22 RX ADMIN — MIDODRINE HYDROCHLORIDE 5 MG: 5 TABLET ORAL at 08:07

## 2025-02-22 RX ADMIN — IPRATROPIUM BROMIDE AND ALBUTEROL SULFATE 1 DOSE: 2.5; .5 SOLUTION RESPIRATORY (INHALATION) at 08:33

## 2025-02-22 RX ADMIN — MIDODRINE HYDROCHLORIDE 5 MG: 5 TABLET ORAL at 11:58

## 2025-02-22 RX ADMIN — BUDESONIDE INHALATION 500 MCG: 0.5 SUSPENSION RESPIRATORY (INHALATION) at 19:18

## 2025-02-22 RX ADMIN — BUDESONIDE INHALATION 500 MCG: 0.5 SUSPENSION RESPIRATORY (INHALATION) at 08:33

## 2025-02-22 RX ADMIN — IPRATROPIUM BROMIDE AND ALBUTEROL SULFATE 1 DOSE: 2.5; .5 SOLUTION RESPIRATORY (INHALATION) at 10:33

## 2025-02-22 RX ADMIN — MIDODRINE HYDROCHLORIDE 5 MG: 5 TABLET ORAL at 17:02

## 2025-02-22 ASSESSMENT — PAIN SCALES - GENERAL
PAINLEVEL_OUTOF10: 0

## 2025-02-23 LAB
ANION GAP SERPL CALC-SCNC: 6 MMOL/L (ref 7–16)
APTT PPP: 75.7 SEC (ref 23.3–37.4)
BUN SERPL-MCNC: 31 MG/DL (ref 8–23)
CALCIUM SERPL-MCNC: 8.8 MG/DL (ref 8.8–10.2)
CHLORIDE SERPL-SCNC: 97 MMOL/L (ref 98–107)
CO2 SERPL-SCNC: 38 MMOL/L (ref 20–29)
CREAT SERPL-MCNC: 1.67 MG/DL (ref 0.8–1.3)
ERYTHROCYTE [DISTWIDTH] IN BLOOD BY AUTOMATED COUNT: 21.2 % (ref 11.9–14.6)
GLUCOSE SERPL-MCNC: 112 MG/DL (ref 70–99)
HCT VFR BLD AUTO: 42.9 % (ref 41.1–50.3)
HGB BLD-MCNC: 12.6 G/DL (ref 13.6–17.2)
MCH RBC QN AUTO: 26.3 PG (ref 26.1–32.9)
MCHC RBC AUTO-ENTMCNC: 29.4 G/DL (ref 31.4–35)
MCV RBC AUTO: 89.4 FL (ref 82–102)
NRBC # BLD: 0 K/UL (ref 0–0.2)
PLATELET # BLD AUTO: 314 K/UL (ref 150–450)
PMV BLD AUTO: 9.4 FL (ref 9.4–12.3)
POTASSIUM SERPL-SCNC: 4.4 MMOL/L (ref 3.5–5.1)
RBC # BLD AUTO: 4.8 M/UL (ref 4.23–5.6)
SODIUM SERPL-SCNC: 141 MMOL/L (ref 136–145)
WBC # BLD AUTO: 11.8 K/UL (ref 4.3–11.1)

## 2025-02-23 PROCEDURE — 99232 SBSQ HOSP IP/OBS MODERATE 35: CPT | Performed by: INTERNAL MEDICINE

## 2025-02-23 PROCEDURE — 6360000002 HC RX W HCPCS: Performed by: INTERNAL MEDICINE

## 2025-02-23 PROCEDURE — 6370000000 HC RX 637 (ALT 250 FOR IP)

## 2025-02-23 PROCEDURE — 6370000000 HC RX 637 (ALT 250 FOR IP): Performed by: INTERNAL MEDICINE

## 2025-02-23 PROCEDURE — 2500000003 HC RX 250 WO HCPCS: Performed by: INTERNAL MEDICINE

## 2025-02-23 PROCEDURE — 2060000000 HC ICU INTERMEDIATE R&B

## 2025-02-23 PROCEDURE — 94640 AIRWAY INHALATION TREATMENT: CPT

## 2025-02-23 PROCEDURE — 80048 BASIC METABOLIC PNL TOTAL CA: CPT

## 2025-02-23 PROCEDURE — 6370000000 HC RX 637 (ALT 250 FOR IP): Performed by: STUDENT IN AN ORGANIZED HEALTH CARE EDUCATION/TRAINING PROGRAM

## 2025-02-23 PROCEDURE — 2580000003 HC RX 258: Performed by: INTERNAL MEDICINE

## 2025-02-23 PROCEDURE — 94761 N-INVAS EAR/PLS OXIMETRY MLT: CPT

## 2025-02-23 PROCEDURE — 85027 COMPLETE CBC AUTOMATED: CPT

## 2025-02-23 PROCEDURE — 85730 THROMBOPLASTIN TIME PARTIAL: CPT

## 2025-02-23 PROCEDURE — 2700000000 HC OXYGEN THERAPY PER DAY

## 2025-02-23 PROCEDURE — 36415 COLL VENOUS BLD VENIPUNCTURE: CPT

## 2025-02-23 PROCEDURE — 6360000002 HC RX W HCPCS: Performed by: STUDENT IN AN ORGANIZED HEALTH CARE EDUCATION/TRAINING PROGRAM

## 2025-02-23 PROCEDURE — 94660 CPAP INITIATION&MGMT: CPT

## 2025-02-23 RX ORDER — OLANZAPINE 5 MG/1
2.5 TABLET, ORALLY DISINTEGRATING ORAL 2 TIMES DAILY PRN
Status: DISCONTINUED | OUTPATIENT
Start: 2025-02-23 | End: 2025-02-27

## 2025-02-23 RX ORDER — IPRATROPIUM BROMIDE AND ALBUTEROL SULFATE 2.5; .5 MG/3ML; MG/3ML
1 SOLUTION RESPIRATORY (INHALATION)
Status: DISCONTINUED | OUTPATIENT
Start: 2025-02-23 | End: 2025-03-01

## 2025-02-23 RX ADMIN — AMIODARONE HYDROCHLORIDE 100 MG: 200 TABLET ORAL at 09:00

## 2025-02-23 RX ADMIN — BUDESONIDE INHALATION 500 MCG: 0.5 SUSPENSION RESPIRATORY (INHALATION) at 09:00

## 2025-02-23 RX ADMIN — SODIUM CHLORIDE, PRESERVATIVE FREE 10 ML: 5 INJECTION INTRAVENOUS at 09:09

## 2025-02-23 RX ADMIN — CEFTRIAXONE SODIUM 2000 MG: 2 INJECTION, POWDER, FOR SOLUTION INTRAMUSCULAR; INTRAVENOUS at 14:11

## 2025-02-23 RX ADMIN — MIDODRINE HYDROCHLORIDE 5 MG: 5 TABLET ORAL at 12:07

## 2025-02-23 RX ADMIN — FERROUS SULFATE TAB 325 MG (65 MG ELEMENTAL FE) 325 MG: 325 (65 FE) TAB at 16:57

## 2025-02-23 RX ADMIN — WATER 40 MG: 1 INJECTION INTRAMUSCULAR; INTRAVENOUS; SUBCUTANEOUS at 14:06

## 2025-02-23 RX ADMIN — IPRATROPIUM BROMIDE AND ALBUTEROL SULFATE 1 DOSE: 2.5; .5 SOLUTION RESPIRATORY (INHALATION) at 09:00

## 2025-02-23 RX ADMIN — BUDESONIDE INHALATION 500 MCG: 0.5 SUSPENSION RESPIRATORY (INHALATION) at 19:19

## 2025-02-23 RX ADMIN — FERROUS SULFATE TAB 325 MG (65 MG ELEMENTAL FE) 325 MG: 325 (65 FE) TAB at 09:00

## 2025-02-23 RX ADMIN — HEPARIN SODIUM 10 UNITS/KG/HR: 10000 INJECTION, SOLUTION INTRAVENOUS at 17:59

## 2025-02-23 RX ADMIN — MIDODRINE HYDROCHLORIDE 5 MG: 5 TABLET ORAL at 16:56

## 2025-02-23 RX ADMIN — IPRATROPIUM BROMIDE AND ALBUTEROL SULFATE 1 DOSE: 2.5; .5 SOLUTION RESPIRATORY (INHALATION) at 19:19

## 2025-02-23 RX ADMIN — SODIUM CHLORIDE, PRESERVATIVE FREE 10 ML: 5 INJECTION INTRAVENOUS at 20:59

## 2025-02-23 RX ADMIN — IPRATROPIUM BROMIDE AND ALBUTEROL SULFATE 1 DOSE: 2.5; .5 SOLUTION RESPIRATORY (INHALATION) at 15:01

## 2025-02-23 RX ADMIN — FUROSEMIDE 40 MG: 10 INJECTION, SOLUTION INTRAMUSCULAR; INTRAVENOUS at 09:00

## 2025-02-23 RX ADMIN — Medication 6 MG: at 21:00

## 2025-02-23 RX ADMIN — FUROSEMIDE 40 MG: 10 INJECTION, SOLUTION INTRAMUSCULAR; INTRAVENOUS at 16:56

## 2025-02-23 RX ADMIN — OLANZAPINE 2.5 MG: 5 TABLET, ORALLY DISINTEGRATING ORAL at 23:52

## 2025-02-23 RX ADMIN — SODIUM CHLORIDE, PRESERVATIVE FREE 10 ML: 5 INJECTION INTRAVENOUS at 21:00

## 2025-02-23 RX ADMIN — MIDODRINE HYDROCHLORIDE 5 MG: 5 TABLET ORAL at 09:00

## 2025-02-23 RX ADMIN — ACETAMINOPHEN 650 MG: 325 TABLET ORAL at 21:00

## 2025-02-23 RX ADMIN — ASPIRIN 81 MG: 81 TABLET, CHEWABLE ORAL at 09:00

## 2025-02-23 RX ADMIN — OLANZAPINE 2.5 MG: 5 TABLET, ORALLY DISINTEGRATING ORAL at 09:00

## 2025-02-23 RX ADMIN — METOPROLOL SUCCINATE 50 MG: 50 TABLET, EXTENDED RELEASE ORAL at 09:00

## 2025-02-23 RX ADMIN — EMPAGLIFLOZIN 10 MG: 10 TABLET, FILM COATED ORAL at 09:00

## 2025-02-23 ASSESSMENT — PAIN SCALES - GENERAL
PAINLEVEL_OUTOF10: 0

## 2025-02-23 NOTE — ICUWATCH
RRT Clinical Rounding Nurse Update    Vitals:    02/23/25 0842 02/23/25 0903 02/23/25 1139 02/23/25 1502   BP: 105/87      Pulse: (!) 108  (!) 101    Resp: 22  20    Temp: 98.2 °F (36.8 °C)      TempSrc: Oral      SpO2: (!) 85% 92% 93% 91%   Weight:       Height:          ASSESSMENT:  Previous outreach assessment was reviewed. There have been no significant changes since previous assessment. 50L 80% HHFNC. Denies SOB. Currently up in recliner.    PLAN:  Will follow per RRT Clinical Rounding Program protocol.    Irwin Jolley RN  [facility]: [phone]  [facility]: [phone]

## 2025-02-23 NOTE — ICUWATCH
RRT Clinical Rounding Nurse Progress Report      SUBJECTIVE: Patient assessed secondary to transfer from critical care.      Vitals:    02/22/25 1506 02/22/25 1541 02/22/25 1920 02/22/25 1949   BP:  91/60  92/75   Pulse: (!) 101 (!) 105 (!) 103 (!) 110   Resp: 22 20 20 19   Temp:  98.2 °F (36.8 °C)  98.2 °F (36.8 °C)   TempSrc:  Oral  Oral   SpO2: 94% 95% 95% 93%   Weight:       Height:            DETERIORATION INDEX SCORE: 46    ASSESSMENT:  Pt resting in bed, A&Ox4 on Airvo 50L/80%, denies CP or SOB. Chest expansion symmetric and unlabored, lung sounds diminished. Recent VS, labs, and progress notes reviewed.    PLAN:  Will follow per RRT Clinical Rounding Program protocol.    Floyd Rivers RN  [facility]: [phone]  [facility]: [phone]

## 2025-02-23 NOTE — ICUWATCH
RRT Clinical Rounding Nurse Update    Vitals:    02/22/25 2354 02/23/25 0400 02/23/25 0842 02/23/25 0903   BP: 113/81 113/78 105/87    Pulse: (!) 113 (!) 109 (!) 108    Resp: 19 19 22    Temp: 97.5 °F (36.4 °C) 99.9 °F (37.7 °C) 98.2 °F (36.8 °C)    TempSrc: Axillary Oral Oral    SpO2: 93% 93% (!) 85% 92%   Weight:  (!) 142.3 kg (313 lb 11.4 oz)     Height:            ASSESSMENT:  Previous outreach assessment was reviewed.  Patient alert and oriented. Respirations unlabored, but RT did have to increase oxygen this morning. Currently on 50L 90% HHFNC. VS, labs, and progress notes reviewed.     PLAN:  Will follow per RRT Clinical Rounding Program protocol.    Irwin Jolley RN  [facility]: [phone]  [facility]: [phone]

## 2025-02-24 ENCOUNTER — APPOINTMENT (OUTPATIENT)
Dept: ULTRASOUND IMAGING | Age: 65
DRG: 871 | End: 2025-02-24
Payer: COMMERCIAL

## 2025-02-24 PROBLEM — K70.31 ASCITES DUE TO ALCOHOLIC CIRRHOSIS (HCC): Status: ACTIVE | Noted: 2025-02-17

## 2025-02-24 PROBLEM — K74.60 DECOMPENSATED HEPATIC CIRRHOSIS (HCC): Status: ACTIVE | Noted: 2025-02-24

## 2025-02-24 PROBLEM — K72.90 DECOMPENSATED HEPATIC CIRRHOSIS (HCC): Status: ACTIVE | Noted: 2025-02-24

## 2025-02-24 LAB
ALBUMIN SERPL-MCNC: 2.8 G/DL (ref 3.2–4.6)
ANION GAP SERPL CALC-SCNC: 7 MMOL/L (ref 7–16)
APTT PPP: 99.5 SEC (ref 23.3–37.4)
BUN SERPL-MCNC: 30 MG/DL (ref 8–23)
CALCIUM SERPL-MCNC: 8.8 MG/DL (ref 8.8–10.2)
CHLORIDE SERPL-SCNC: 96 MMOL/L (ref 98–107)
CO2 SERPL-SCNC: 41 MMOL/L (ref 20–29)
CREAT SERPL-MCNC: 1.43 MG/DL (ref 0.8–1.3)
GLUCOSE SERPL-MCNC: 119 MG/DL (ref 70–99)
PHOSPHATE SERPL-MCNC: 3.8 MG/DL (ref 2.5–4.5)
POTASSIUM SERPL-SCNC: 4.4 MMOL/L (ref 3.5–5.1)
SODIUM SERPL-SCNC: 143 MMOL/L (ref 136–145)

## 2025-02-24 PROCEDURE — 99232 SBSQ HOSP IP/OBS MODERATE 35: CPT | Performed by: INTERNAL MEDICINE

## 2025-02-24 PROCEDURE — 94660 CPAP INITIATION&MGMT: CPT

## 2025-02-24 PROCEDURE — 2500000003 HC RX 250 WO HCPCS: Performed by: INTERNAL MEDICINE

## 2025-02-24 PROCEDURE — 97530 THERAPEUTIC ACTIVITIES: CPT

## 2025-02-24 PROCEDURE — 6360000002 HC RX W HCPCS: Performed by: INTERNAL MEDICINE

## 2025-02-24 PROCEDURE — 94640 AIRWAY INHALATION TREATMENT: CPT

## 2025-02-24 PROCEDURE — 94760 N-INVAS EAR/PLS OXIMETRY 1: CPT

## 2025-02-24 PROCEDURE — 6360000002 HC RX W HCPCS: Performed by: NURSE PRACTITIONER

## 2025-02-24 PROCEDURE — 6370000000 HC RX 637 (ALT 250 FOR IP)

## 2025-02-24 PROCEDURE — 6370000000 HC RX 637 (ALT 250 FOR IP): Performed by: INTERNAL MEDICINE

## 2025-02-24 PROCEDURE — 6370000000 HC RX 637 (ALT 250 FOR IP): Performed by: STUDENT IN AN ORGANIZED HEALTH CARE EDUCATION/TRAINING PROGRAM

## 2025-02-24 PROCEDURE — 36415 COLL VENOUS BLD VENIPUNCTURE: CPT

## 2025-02-24 PROCEDURE — 2060000000 HC ICU INTERMEDIATE R&B

## 2025-02-24 PROCEDURE — 2700000000 HC OXYGEN THERAPY PER DAY

## 2025-02-24 PROCEDURE — 80069 RENAL FUNCTION PANEL: CPT

## 2025-02-24 PROCEDURE — 85730 THROMBOPLASTIN TIME PARTIAL: CPT

## 2025-02-24 PROCEDURE — 6360000002 HC RX W HCPCS: Performed by: STUDENT IN AN ORGANIZED HEALTH CARE EDUCATION/TRAINING PROGRAM

## 2025-02-24 PROCEDURE — 94761 N-INVAS EAR/PLS OXIMETRY MLT: CPT

## 2025-02-24 PROCEDURE — 76705 ECHO EXAM OF ABDOMEN: CPT

## 2025-02-24 RX ORDER — MIDODRINE HYDROCHLORIDE 5 MG/1
2.5 TABLET ORAL
Status: DISCONTINUED | OUTPATIENT
Start: 2025-02-24 | End: 2025-02-26

## 2025-02-24 RX ADMIN — BUDESONIDE INHALATION 500 MCG: 0.5 SUSPENSION RESPIRATORY (INHALATION) at 08:35

## 2025-02-24 RX ADMIN — SODIUM CHLORIDE, PRESERVATIVE FREE 10 ML: 5 INJECTION INTRAVENOUS at 08:59

## 2025-02-24 RX ADMIN — HEPARIN SODIUM 10 UNITS/KG/HR: 10000 INJECTION, SOLUTION INTRAVENOUS at 11:32

## 2025-02-24 RX ADMIN — WATER 40 MG: 1 INJECTION INTRAMUSCULAR; INTRAVENOUS; SUBCUTANEOUS at 13:21

## 2025-02-24 RX ADMIN — Medication 6 MG: at 22:08

## 2025-02-24 RX ADMIN — BUMETANIDE 0.5 MG/HR: 0.25 INJECTION INTRAMUSCULAR; INTRAVENOUS at 10:51

## 2025-02-24 RX ADMIN — IPRATROPIUM BROMIDE AND ALBUTEROL SULFATE 1 DOSE: 2.5; .5 SOLUTION RESPIRATORY (INHALATION) at 08:35

## 2025-02-24 RX ADMIN — FERROUS SULFATE TAB 325 MG (65 MG ELEMENTAL FE) 325 MG: 325 (65 FE) TAB at 17:07

## 2025-02-24 RX ADMIN — WATER 40 MG: 1 INJECTION INTRAMUSCULAR; INTRAVENOUS; SUBCUTANEOUS at 01:38

## 2025-02-24 RX ADMIN — METOPROLOL SUCCINATE 50 MG: 50 TABLET, EXTENDED RELEASE ORAL at 08:59

## 2025-02-24 RX ADMIN — OLANZAPINE 2.5 MG: 5 TABLET, ORALLY DISINTEGRATING ORAL at 22:08

## 2025-02-24 RX ADMIN — ASPIRIN 81 MG: 81 TABLET, CHEWABLE ORAL at 08:59

## 2025-02-24 RX ADMIN — IPRATROPIUM BROMIDE AND ALBUTEROL SULFATE 1 DOSE: 2.5; .5 SOLUTION RESPIRATORY (INHALATION) at 20:24

## 2025-02-24 RX ADMIN — IPRATROPIUM BROMIDE AND ALBUTEROL SULFATE 1 DOSE: 2.5; .5 SOLUTION RESPIRATORY (INHALATION) at 15:43

## 2025-02-24 RX ADMIN — AMIODARONE HYDROCHLORIDE 100 MG: 200 TABLET ORAL at 08:58

## 2025-02-24 RX ADMIN — FERROUS SULFATE TAB 325 MG (65 MG ELEMENTAL FE) 325 MG: 325 (65 FE) TAB at 08:59

## 2025-02-24 RX ADMIN — SODIUM CHLORIDE, PRESERVATIVE FREE 10 ML: 5 INJECTION INTRAVENOUS at 22:28

## 2025-02-24 RX ADMIN — MIDODRINE HYDROCHLORIDE 2.5 MG: 5 TABLET ORAL at 12:16

## 2025-02-24 RX ADMIN — EMPAGLIFLOZIN 10 MG: 10 TABLET, FILM COATED ORAL at 08:58

## 2025-02-24 RX ADMIN — BUDESONIDE INHALATION 500 MCG: 0.5 SUSPENSION RESPIRATORY (INHALATION) at 20:24

## 2025-02-24 RX ADMIN — FUROSEMIDE 40 MG: 10 INJECTION, SOLUTION INTRAMUSCULAR; INTRAVENOUS at 08:59

## 2025-02-24 RX ADMIN — MIDODRINE HYDROCHLORIDE 5 MG: 5 TABLET ORAL at 08:58

## 2025-02-24 RX ADMIN — MIDODRINE HYDROCHLORIDE 2.5 MG: 5 TABLET ORAL at 17:06

## 2025-02-24 ASSESSMENT — PAIN SCALES - GENERAL
PAINLEVEL_OUTOF10: 0

## 2025-02-24 NOTE — ICUWATCH
RRT Clinical Rounding Nurse Update    Vitals:    02/23/25 1502 02/23/25 1711 02/23/25 1921 02/23/25 2102   BP:  94/79  109/81   Pulse:  (!) 101 (!) 101 91   Resp:  22 20 19   Temp:  97.7 °F (36.5 °C)  98.2 °F (36.8 °C)   TempSrc:    Oral   SpO2: 91% (!) 87% 90% 93%   Weight:       Height:            DETERIORATION INDEX SCORE: 36    ASSESSMENT:  Previous outreach assessment was reviewed. There have been no significant changes since previous assessment. Resting in bed on Airvo 50L/80% SpO2 92%, denies SOB or CP, Chest expansion symmetric and unlabored, lung sounds diminished. Recent labs, VS, and progress notes reviewed.    PLAN:  Will follow per RRT Clinical Rounding Program protocol.    Floyd Rivers RN  [facility]: [phone]  [facility]: [phone]

## 2025-02-25 LAB
ANION GAP SERPL CALC-SCNC: 10 MMOL/L (ref 7–16)
APTT PPP: 54.8 SEC (ref 23.3–37.4)
APTT PPP: 56.1 SEC (ref 23.3–37.4)
BUN SERPL-MCNC: 36 MG/DL (ref 8–23)
CALCIUM SERPL-MCNC: 9.2 MG/DL (ref 8.8–10.2)
CHLORIDE SERPL-SCNC: 93 MMOL/L (ref 98–107)
CO2 SERPL-SCNC: 38 MMOL/L (ref 20–29)
CREAT SERPL-MCNC: 1.54 MG/DL (ref 0.8–1.3)
ERYTHROCYTE [DISTWIDTH] IN BLOOD BY AUTOMATED COUNT: 21.7 % (ref 11.9–14.6)
GLUCOSE SERPL-MCNC: 112 MG/DL (ref 70–99)
HCT VFR BLD AUTO: 43.5 % (ref 41.1–50.3)
HGB BLD-MCNC: 13.3 G/DL (ref 13.6–17.2)
MCH RBC QN AUTO: 26.5 PG (ref 26.1–32.9)
MCHC RBC AUTO-ENTMCNC: 30.6 G/DL (ref 31.4–35)
MCV RBC AUTO: 86.7 FL (ref 82–102)
NRBC # BLD: 0 K/UL (ref 0–0.2)
PLATELET # BLD AUTO: 367 K/UL (ref 150–450)
PMV BLD AUTO: 10.9 FL (ref 9.4–12.3)
POTASSIUM SERPL-SCNC: 4.8 MMOL/L (ref 3.5–5.1)
RBC # BLD AUTO: 5.02 M/UL (ref 4.23–5.6)
SODIUM SERPL-SCNC: 142 MMOL/L (ref 136–145)
WBC # BLD AUTO: 17.3 K/UL (ref 4.3–11.1)

## 2025-02-25 PROCEDURE — 6360000002 HC RX W HCPCS: Performed by: INTERNAL MEDICINE

## 2025-02-25 PROCEDURE — 94640 AIRWAY INHALATION TREATMENT: CPT

## 2025-02-25 PROCEDURE — 6370000000 HC RX 637 (ALT 250 FOR IP): Performed by: INTERNAL MEDICINE

## 2025-02-25 PROCEDURE — 97530 THERAPEUTIC ACTIVITIES: CPT

## 2025-02-25 PROCEDURE — 2060000000 HC ICU INTERMEDIATE R&B

## 2025-02-25 PROCEDURE — 2500000003 HC RX 250 WO HCPCS: Performed by: INTERNAL MEDICINE

## 2025-02-25 PROCEDURE — 85027 COMPLETE CBC AUTOMATED: CPT

## 2025-02-25 PROCEDURE — 6370000000 HC RX 637 (ALT 250 FOR IP): Performed by: STUDENT IN AN ORGANIZED HEALTH CARE EDUCATION/TRAINING PROGRAM

## 2025-02-25 PROCEDURE — 36415 COLL VENOUS BLD VENIPUNCTURE: CPT

## 2025-02-25 PROCEDURE — 80048 BASIC METABOLIC PNL TOTAL CA: CPT

## 2025-02-25 PROCEDURE — 6360000002 HC RX W HCPCS

## 2025-02-25 PROCEDURE — 6360000002 HC RX W HCPCS: Performed by: NURSE PRACTITIONER

## 2025-02-25 PROCEDURE — 99232 SBSQ HOSP IP/OBS MODERATE 35: CPT | Performed by: INTERNAL MEDICINE

## 2025-02-25 PROCEDURE — 2700000000 HC OXYGEN THERAPY PER DAY

## 2025-02-25 PROCEDURE — 97535 SELF CARE MNGMENT TRAINING: CPT

## 2025-02-25 PROCEDURE — 94660 CPAP INITIATION&MGMT: CPT

## 2025-02-25 PROCEDURE — 6370000000 HC RX 637 (ALT 250 FOR IP)

## 2025-02-25 PROCEDURE — 94761 N-INVAS EAR/PLS OXIMETRY MLT: CPT

## 2025-02-25 PROCEDURE — 85730 THROMBOPLASTIN TIME PARTIAL: CPT

## 2025-02-25 RX ADMIN — SODIUM CHLORIDE, PRESERVATIVE FREE 5 ML: 5 INJECTION INTRAVENOUS at 08:00

## 2025-02-25 RX ADMIN — IPRATROPIUM BROMIDE AND ALBUTEROL SULFATE 1 DOSE: 2.5; .5 SOLUTION RESPIRATORY (INHALATION) at 08:20

## 2025-02-25 RX ADMIN — IPRATROPIUM BROMIDE AND ALBUTEROL SULFATE 1 DOSE: 2.5; .5 SOLUTION RESPIRATORY (INHALATION) at 15:02

## 2025-02-25 RX ADMIN — Medication 6 MG: at 22:26

## 2025-02-25 RX ADMIN — BUDESONIDE INHALATION 500 MCG: 0.5 SUSPENSION RESPIRATORY (INHALATION) at 20:21

## 2025-02-25 RX ADMIN — BUMETANIDE 1 MG/HR: 0.25 INJECTION INTRAMUSCULAR; INTRAVENOUS at 16:00

## 2025-02-25 RX ADMIN — HEPARIN SODIUM 12 UNITS/KG/HR: 10000 INJECTION, SOLUTION INTRAVENOUS at 20:28

## 2025-02-25 RX ADMIN — DIPHENHYDRAMINE HYDROCHLORIDE 25 MG: 50 INJECTION INTRAMUSCULAR; INTRAVENOUS at 22:28

## 2025-02-25 RX ADMIN — ASPIRIN 81 MG: 81 TABLET, CHEWABLE ORAL at 07:57

## 2025-02-25 RX ADMIN — IPRATROPIUM BROMIDE AND ALBUTEROL SULFATE 1 DOSE: 2.5; .5 SOLUTION RESPIRATORY (INHALATION) at 20:21

## 2025-02-25 RX ADMIN — EMPAGLIFLOZIN 10 MG: 10 TABLET, FILM COATED ORAL at 07:57

## 2025-02-25 RX ADMIN — FERROUS SULFATE TAB 325 MG (65 MG ELEMENTAL FE) 325 MG: 325 (65 FE) TAB at 07:57

## 2025-02-25 RX ADMIN — AMIODARONE HYDROCHLORIDE 100 MG: 200 TABLET ORAL at 07:57

## 2025-02-25 RX ADMIN — MIDODRINE HYDROCHLORIDE 2.5 MG: 5 TABLET ORAL at 12:01

## 2025-02-25 RX ADMIN — METOPROLOL SUCCINATE 50 MG: 50 TABLET, EXTENDED RELEASE ORAL at 07:57

## 2025-02-25 RX ADMIN — BUDESONIDE INHALATION 500 MCG: 0.5 SUSPENSION RESPIRATORY (INHALATION) at 08:20

## 2025-02-25 RX ADMIN — MIDODRINE HYDROCHLORIDE 2.5 MG: 5 TABLET ORAL at 07:57

## 2025-02-25 RX ADMIN — FERROUS SULFATE TAB 325 MG (65 MG ELEMENTAL FE) 325 MG: 325 (65 FE) TAB at 16:37

## 2025-02-25 RX ADMIN — SODIUM CHLORIDE, PRESERVATIVE FREE 10 ML: 5 INJECTION INTRAVENOUS at 20:32

## 2025-02-25 RX ADMIN — WATER 40 MG: 1 INJECTION INTRAMUSCULAR; INTRAVENOUS; SUBCUTANEOUS at 03:12

## 2025-02-25 RX ADMIN — BUMETANIDE 0.5 MG/HR: 0.25 INJECTION INTRAMUSCULAR; INTRAVENOUS at 03:25

## 2025-02-25 RX ADMIN — OLANZAPINE 2.5 MG: 5 TABLET, ORALLY DISINTEGRATING ORAL at 22:26

## 2025-02-25 RX ADMIN — HEPARIN SODIUM 2000 UNITS: 1000 INJECTION INTRAVENOUS; SUBCUTANEOUS at 17:38

## 2025-02-25 RX ADMIN — HEPARIN SODIUM 10 UNITS/KG/HR: 10000 INJECTION, SOLUTION INTRAVENOUS at 04:17

## 2025-02-25 RX ADMIN — MIDODRINE HYDROCHLORIDE 2.5 MG: 5 TABLET ORAL at 16:37

## 2025-02-25 NOTE — WOUND CARE
Consulted for BUE tears. Pt states skin is extremely fragile. Superficial BUE tears (one on each arm) no greater 0.5x0.2x0.1cm, pink/red, scant sanguinous, no odor. Recommend wound cleanser, pink silicone border foam every other day&PRN.

## 2025-02-26 ENCOUNTER — HOSPITAL ENCOUNTER (INPATIENT)
Dept: NUCLEAR MEDICINE | Age: 65
Discharge: HOME | DRG: 871 | End: 2025-02-28
Payer: COMMERCIAL

## 2025-02-26 ENCOUNTER — APPOINTMENT (OUTPATIENT)
Dept: GENERAL RADIOLOGY | Age: 65
DRG: 871 | End: 2025-02-26
Payer: COMMERCIAL

## 2025-02-26 LAB
ANION GAP SERPL CALC-SCNC: 9 MMOL/L (ref 7–16)
APTT PPP: 106.6 SEC (ref 23.3–37.4)
APTT PPP: 97.2 SEC (ref 23.3–37.4)
BUN SERPL-MCNC: 36 MG/DL (ref 8–23)
CALCIUM SERPL-MCNC: 9.3 MG/DL (ref 8.8–10.2)
CHLORIDE SERPL-SCNC: 91 MMOL/L (ref 98–107)
CO2 SERPL-SCNC: 45 MMOL/L (ref 20–29)
CREAT SERPL-MCNC: 1.37 MG/DL (ref 0.8–1.3)
ERYTHROCYTE [DISTWIDTH] IN BLOOD BY AUTOMATED COUNT: 21.7 % (ref 11.9–14.6)
GLUCOSE SERPL-MCNC: 82 MG/DL (ref 70–99)
HCT VFR BLD AUTO: 42.8 % (ref 41.1–50.3)
HGB BLD-MCNC: 12.9 G/DL (ref 13.6–17.2)
MCH RBC QN AUTO: 26.9 PG (ref 26.1–32.9)
MCHC RBC AUTO-ENTMCNC: 30.1 G/DL (ref 31.4–35)
MCV RBC AUTO: 89.4 FL (ref 82–102)
NRBC # BLD: 0 K/UL (ref 0–0.2)
PLATELET # BLD AUTO: 347 K/UL (ref 150–450)
PMV BLD AUTO: 9.9 FL (ref 9.4–12.3)
POTASSIUM SERPL-SCNC: 4.6 MMOL/L (ref 3.5–5.1)
RBC # BLD AUTO: 4.79 M/UL (ref 4.23–5.6)
SODIUM SERPL-SCNC: 144 MMOL/L (ref 136–145)
WBC # BLD AUTO: 17.5 K/UL (ref 4.3–11.1)

## 2025-02-26 PROCEDURE — 6370000000 HC RX 637 (ALT 250 FOR IP): Performed by: INTERNAL MEDICINE

## 2025-02-26 PROCEDURE — 3430000000 HC RX DIAGNOSTIC RADIOPHARMACEUTICAL: Performed by: INTERNAL MEDICINE

## 2025-02-26 PROCEDURE — 6370000000 HC RX 637 (ALT 250 FOR IP): Performed by: STUDENT IN AN ORGANIZED HEALTH CARE EDUCATION/TRAINING PROGRAM

## 2025-02-26 PROCEDURE — 71045 X-RAY EXAM CHEST 1 VIEW: CPT

## 2025-02-26 PROCEDURE — 94761 N-INVAS EAR/PLS OXIMETRY MLT: CPT

## 2025-02-26 PROCEDURE — 36415 COLL VENOUS BLD VENIPUNCTURE: CPT

## 2025-02-26 PROCEDURE — 6360000002 HC RX W HCPCS: Performed by: INTERNAL MEDICINE

## 2025-02-26 PROCEDURE — A9539 TC99M PENTETATE: HCPCS | Performed by: INTERNAL MEDICINE

## 2025-02-26 PROCEDURE — 94640 AIRWAY INHALATION TREATMENT: CPT

## 2025-02-26 PROCEDURE — 99232 SBSQ HOSP IP/OBS MODERATE 35: CPT | Performed by: INTERNAL MEDICINE

## 2025-02-26 PROCEDURE — 6360000002 HC RX W HCPCS

## 2025-02-26 PROCEDURE — 6370000000 HC RX 637 (ALT 250 FOR IP)

## 2025-02-26 PROCEDURE — 71046 X-RAY EXAM CHEST 2 VIEWS: CPT

## 2025-02-26 PROCEDURE — 2700000000 HC OXYGEN THERAPY PER DAY

## 2025-02-26 PROCEDURE — 94660 CPAP INITIATION&MGMT: CPT

## 2025-02-26 PROCEDURE — A9540 TC99M MAA: HCPCS | Performed by: INTERNAL MEDICINE

## 2025-02-26 PROCEDURE — 2060000000 HC ICU INTERMEDIATE R&B

## 2025-02-26 PROCEDURE — 2500000003 HC RX 250 WO HCPCS: Performed by: INTERNAL MEDICINE

## 2025-02-26 PROCEDURE — 85027 COMPLETE CBC AUTOMATED: CPT

## 2025-02-26 PROCEDURE — 85730 THROMBOPLASTIN TIME PARTIAL: CPT

## 2025-02-26 PROCEDURE — 80048 BASIC METABOLIC PNL TOTAL CA: CPT

## 2025-02-26 PROCEDURE — 78582 LUNG VENTILAT&PERFUS IMAGING: CPT

## 2025-02-26 RX ORDER — KIT FOR THE PREPARATION OF TECHNETIUM TC 99M PENTETATE 20 MG/1
43.8 INJECTION, POWDER, LYOPHILIZED, FOR SOLUTION INTRAVENOUS; RESPIRATORY (INHALATION)
Status: COMPLETED | OUTPATIENT
Start: 2025-02-26 | End: 2025-02-26

## 2025-02-26 RX ORDER — SPIRONOLACTONE 25 MG/1
100 TABLET ORAL DAILY
Status: DISCONTINUED | OUTPATIENT
Start: 2025-02-27 | End: 2025-03-02 | Stop reason: HOSPADM

## 2025-02-26 RX ORDER — ACETAZOLAMIDE 500 MG/1
500 CAPSULE, EXTENDED RELEASE ORAL EVERY 12 HOURS SCHEDULED
Status: DISCONTINUED | OUTPATIENT
Start: 2025-02-26 | End: 2025-02-27

## 2025-02-26 RX ADMIN — MIDODRINE HYDROCHLORIDE 2.5 MG: 5 TABLET ORAL at 08:08

## 2025-02-26 RX ADMIN — IPRATROPIUM BROMIDE AND ALBUTEROL SULFATE 1 DOSE: 2.5; .5 SOLUTION RESPIRATORY (INHALATION) at 14:50

## 2025-02-26 RX ADMIN — FERROUS SULFATE TAB 325 MG (65 MG ELEMENTAL FE) 325 MG: 325 (65 FE) TAB at 17:34

## 2025-02-26 RX ADMIN — FERROUS SULFATE TAB 325 MG (65 MG ELEMENTAL FE) 325 MG: 325 (65 FE) TAB at 08:06

## 2025-02-26 RX ADMIN — SODIUM CHLORIDE, PRESERVATIVE FREE 10 ML: 5 INJECTION INTRAVENOUS at 21:56

## 2025-02-26 RX ADMIN — Medication 6 MG: at 21:42

## 2025-02-26 RX ADMIN — KIT FOR THE PREPARATION OF TECHNETIUM TC 99M ALBUMIN AGGREGATED 6.3 MILLICURIE: 2.5 INJECTION, POWDER, FOR SOLUTION INTRAVENOUS at 14:20

## 2025-02-26 RX ADMIN — ACETAZOLAMIDE 500 MG: 500 CAPSULE ORAL at 21:42

## 2025-02-26 RX ADMIN — BUDESONIDE INHALATION 500 MCG: 0.5 SUSPENSION RESPIRATORY (INHALATION) at 07:39

## 2025-02-26 RX ADMIN — IPRATROPIUM BROMIDE AND ALBUTEROL SULFATE 1 DOSE: 2.5; .5 SOLUTION RESPIRATORY (INHALATION) at 21:14

## 2025-02-26 RX ADMIN — ASPIRIN 81 MG: 81 TABLET, CHEWABLE ORAL at 08:06

## 2025-02-26 RX ADMIN — ACETAZOLAMIDE 500 MG: 500 CAPSULE ORAL at 11:20

## 2025-02-26 RX ADMIN — IPRATROPIUM BROMIDE AND ALBUTEROL SULFATE 1 DOSE: 2.5; .5 SOLUTION RESPIRATORY (INHALATION) at 07:39

## 2025-02-26 RX ADMIN — MIDODRINE HYDROCHLORIDE 2.5 MG: 5 TABLET ORAL at 12:49

## 2025-02-26 RX ADMIN — HEPARIN SODIUM 12 UNITS/KG/HR: 10000 INJECTION, SOLUTION INTRAVENOUS at 10:46

## 2025-02-26 RX ADMIN — OLANZAPINE 2.5 MG: 5 TABLET, ORALLY DISINTEGRATING ORAL at 21:42

## 2025-02-26 RX ADMIN — METOPROLOL SUCCINATE 50 MG: 50 TABLET, EXTENDED RELEASE ORAL at 08:06

## 2025-02-26 RX ADMIN — BUMETANIDE 1 MG/HR: 0.25 INJECTION INTRAMUSCULAR; INTRAVENOUS at 04:41

## 2025-02-26 RX ADMIN — EMPAGLIFLOZIN 10 MG: 10 TABLET, FILM COATED ORAL at 08:06

## 2025-02-26 RX ADMIN — DIPHENHYDRAMINE HYDROCHLORIDE 25 MG: 50 INJECTION INTRAMUSCULAR; INTRAVENOUS at 21:42

## 2025-02-26 RX ADMIN — AMIODARONE HYDROCHLORIDE 100 MG: 200 TABLET ORAL at 08:05

## 2025-02-26 RX ADMIN — BUDESONIDE INHALATION 500 MCG: 0.5 SUSPENSION RESPIRATORY (INHALATION) at 21:14

## 2025-02-26 RX ADMIN — SODIUM CHLORIDE, PRESERVATIVE FREE 10 ML: 5 INJECTION INTRAVENOUS at 08:08

## 2025-02-26 RX ADMIN — KIT FOR THE PREPARATION OF TECHNETIUM TC 99M PENTETATE 43.8 MILLICURIE: 20 INJECTION, POWDER, LYOPHILIZED, FOR SOLUTION INTRAVENOUS; RESPIRATORY (INHALATION) at 14:00

## 2025-02-26 RX ADMIN — SODIUM CHLORIDE, PRESERVATIVE FREE 10 ML: 5 INJECTION INTRAVENOUS at 08:06

## 2025-02-26 ASSESSMENT — PAIN SCALES - GENERAL
PAINLEVEL_OUTOF10: 0

## 2025-02-27 ENCOUNTER — APPOINTMENT (OUTPATIENT)
Dept: INTERVENTIONAL RADIOLOGY/VASCULAR | Age: 65
DRG: 871 | End: 2025-02-27
Attending: INTERNAL MEDICINE
Payer: COMMERCIAL

## 2025-02-27 PROBLEM — I50.811 ACUTE RIGHT-SIDED HEART FAILURE (HCC): Status: ACTIVE | Noted: 2025-02-27

## 2025-02-27 PROBLEM — I50.22 HEART FAILURE WITH MID-RANGE EJECTION FRACTION (HCC): Status: ACTIVE | Noted: 2025-02-27

## 2025-02-27 LAB
ANION GAP BLD CALC-SCNC: ABNORMAL MMOL/L
ANION GAP SERPL CALC-SCNC: 6 MMOL/L (ref 7–16)
APTT PPP: 35.4 SEC (ref 23.3–37.4)
ARTERIAL PATENCY WRIST A: ABNORMAL
ARTERIAL PATENCY WRIST A: POSITIVE
BASE EXCESS BLD CALC-SCNC: 16.1 MMOL/L
BASE EXCESS BLD CALC-SCNC: 24.2 MMOL/L
BDY SITE: ABNORMAL
BDY SITE: ABNORMAL
BUN SERPL-MCNC: 40 MG/DL (ref 8–23)
CA-I BLD-MCNC: 1.26 MMOL/L (ref 1.12–1.32)
CALCIUM SERPL-MCNC: 9.2 MG/DL (ref 8.8–10.2)
CHLORIDE SERPL-SCNC: 91 MMOL/L (ref 98–107)
CO2 BLD-SCNC: >35 MMOL/L (ref 13–23)
CO2 SERPL-SCNC: 46 MMOL/L (ref 20–29)
CREAT SERPL-MCNC: 1.43 MG/DL (ref 0.8–1.3)
ERYTHROCYTE [DISTWIDTH] IN BLOOD BY AUTOMATED COUNT: 21.4 % (ref 11.9–14.6)
FIO2 ON VENT: 60 %
GAS FLOW.O2 O2 DELIVERY SYS: ABNORMAL
GAS FLOW.O2 O2 DELIVERY SYS: ABNORMAL
GLUCOSE SERPL-MCNC: 105 MG/DL (ref 70–99)
HCO3 BLD-SCNC: 46 MMOL/L (ref 22–26)
HCO3 BLD-SCNC: 55 MMOL/L (ref 22–26)
HCT VFR BLD AUTO: 34.4 % (ref 41.1–50.3)
HGB BLD-MCNC: 10.3 G/DL (ref 13.6–17.2)
MCH RBC QN AUTO: 26.5 PG (ref 26.1–32.9)
MCHC RBC AUTO-ENTMCNC: 29.9 G/DL (ref 31.4–35)
MCV RBC AUTO: 88.4 FL (ref 82–102)
NRBC # BLD: 0 K/UL (ref 0–0.2)
PCO2 BLD: 83.9 MMHG (ref 35–45)
PCO2 BLD: 92.7 MMHG (ref 35–45)
PH BLD: 7.35 (ref 7.35–7.45)
PH BLD: 7.38 (ref 7.35–7.45)
PLATELET # BLD AUTO: 291 K/UL (ref 150–450)
PMV BLD AUTO: 9.7 FL (ref 9.4–12.3)
PO2 BLD: 53 MMHG (ref 75–100)
PO2 BLD: 71 MMHG (ref 75–100)
POC FIO2: 10
POTASSIUM BLD-SCNC: 3.7 MMOL/L (ref 3.5–5.1)
POTASSIUM SERPL-SCNC: 3.8 MMOL/L (ref 3.5–5.1)
RBC # BLD AUTO: 3.89 M/UL (ref 4.23–5.6)
SAO2 % BLD: 82.1 % (ref 95–98)
SAO2 % BLD: 92 %
SERVICE CMNT-IMP: ABNORMAL
SODIUM BLD-SCNC: 145 MMOL/L (ref 136–145)
SODIUM SERPL-SCNC: 144 MMOL/L (ref 136–145)
SPECIMEN SITE: ABNORMAL
SPECIMEN TYPE: ABNORMAL
WBC # BLD AUTO: 15.3 K/UL (ref 4.3–11.1)

## 2025-02-27 PROCEDURE — 2500000003 HC RX 250 WO HCPCS: Performed by: INTERNAL MEDICINE

## 2025-02-27 PROCEDURE — 85014 HEMATOCRIT: CPT

## 2025-02-27 PROCEDURE — 82330 ASSAY OF CALCIUM: CPT

## 2025-02-27 PROCEDURE — 85027 COMPLETE CBC AUTOMATED: CPT

## 2025-02-27 PROCEDURE — 6370000000 HC RX 637 (ALT 250 FOR IP): Performed by: INTERNAL MEDICINE

## 2025-02-27 PROCEDURE — 36415 COLL VENOUS BLD VENIPUNCTURE: CPT

## 2025-02-27 PROCEDURE — 6370000000 HC RX 637 (ALT 250 FOR IP)

## 2025-02-27 PROCEDURE — C1729 CATH, DRAINAGE: HCPCS

## 2025-02-27 PROCEDURE — 94640 AIRWAY INHALATION TREATMENT: CPT

## 2025-02-27 PROCEDURE — 83605 ASSAY OF LACTIC ACID: CPT

## 2025-02-27 PROCEDURE — 84295 ASSAY OF SERUM SODIUM: CPT

## 2025-02-27 PROCEDURE — 6360000002 HC RX W HCPCS: Performed by: INTERNAL MEDICINE

## 2025-02-27 PROCEDURE — 49083 ABD PARACENTESIS W/IMAGING: CPT

## 2025-02-27 PROCEDURE — 85730 THROMBOPLASTIN TIME PARTIAL: CPT

## 2025-02-27 PROCEDURE — 2060000000 HC ICU INTERMEDIATE R&B

## 2025-02-27 PROCEDURE — 6370000000 HC RX 637 (ALT 250 FOR IP): Performed by: NURSE PRACTITIONER

## 2025-02-27 PROCEDURE — 0W9G3ZZ DRAINAGE OF PERITONEAL CAVITY, PERCUTANEOUS APPROACH: ICD-10-PCS | Performed by: PHYSICIAN ASSISTANT

## 2025-02-27 PROCEDURE — 99232 SBSQ HOSP IP/OBS MODERATE 35: CPT | Performed by: INTERNAL MEDICINE

## 2025-02-27 PROCEDURE — 82947 ASSAY GLUCOSE BLOOD QUANT: CPT

## 2025-02-27 PROCEDURE — 99233 SBSQ HOSP IP/OBS HIGH 50: CPT | Performed by: INTERNAL MEDICINE

## 2025-02-27 PROCEDURE — 80048 BASIC METABOLIC PNL TOTAL CA: CPT

## 2025-02-27 PROCEDURE — 84132 ASSAY OF SERUM POTASSIUM: CPT

## 2025-02-27 PROCEDURE — 82803 BLOOD GASES ANY COMBINATION: CPT

## 2025-02-27 PROCEDURE — 2700000000 HC OXYGEN THERAPY PER DAY

## 2025-02-27 PROCEDURE — 6370000000 HC RX 637 (ALT 250 FOR IP): Performed by: STUDENT IN AN ORGANIZED HEALTH CARE EDUCATION/TRAINING PROGRAM

## 2025-02-27 PROCEDURE — 94660 CPAP INITIATION&MGMT: CPT

## 2025-02-27 PROCEDURE — 49083 ABD PARACENTESIS W/IMAGING: CPT | Performed by: PHYSICIAN ASSISTANT

## 2025-02-27 PROCEDURE — 36600 WITHDRAWAL OF ARTERIAL BLOOD: CPT

## 2025-02-27 RX ORDER — BUMETANIDE 1 MG/1
2 TABLET ORAL 2 TIMES DAILY
Status: DISCONTINUED | OUTPATIENT
Start: 2025-02-27 | End: 2025-03-02 | Stop reason: HOSPADM

## 2025-02-27 RX ORDER — HYDROXYZINE PAMOATE 25 MG/1
50 CAPSULE ORAL EVERY 6 HOURS PRN
Status: DISCONTINUED | OUTPATIENT
Start: 2025-02-27 | End: 2025-03-02 | Stop reason: HOSPADM

## 2025-02-27 RX ORDER — OLANZAPINE 5 MG/1
2.5 TABLET, ORALLY DISINTEGRATING ORAL 2 TIMES DAILY
Status: DISCONTINUED | OUTPATIENT
Start: 2025-02-27 | End: 2025-02-27

## 2025-02-27 RX ORDER — DILTIAZEM HYDROCHLORIDE 30 MG/1
30 TABLET, FILM COATED ORAL EVERY 6 HOURS SCHEDULED
Status: DISCONTINUED | OUTPATIENT
Start: 2025-02-27 | End: 2025-03-02 | Stop reason: HOSPADM

## 2025-02-27 RX ORDER — TAMSULOSIN HYDROCHLORIDE 0.4 MG/1
0.4 CAPSULE ORAL DAILY
Status: DISCONTINUED | OUTPATIENT
Start: 2025-02-27 | End: 2025-03-02 | Stop reason: HOSPADM

## 2025-02-27 RX ADMIN — IPRATROPIUM BROMIDE AND ALBUTEROL SULFATE 1 DOSE: 2.5; .5 SOLUTION RESPIRATORY (INHALATION) at 21:58

## 2025-02-27 RX ADMIN — SODIUM CHLORIDE, PRESERVATIVE FREE 10 ML: 5 INJECTION INTRAVENOUS at 08:37

## 2025-02-27 RX ADMIN — SPIRONOLACTONE 100 MG: 25 TABLET ORAL at 08:35

## 2025-02-27 RX ADMIN — IPRATROPIUM BROMIDE AND ALBUTEROL SULFATE 1 DOSE: 2.5; .5 SOLUTION RESPIRATORY (INHALATION) at 15:22

## 2025-02-27 RX ADMIN — OLANZAPINE 2.5 MG: 5 TABLET, ORALLY DISINTEGRATING ORAL at 08:36

## 2025-02-27 RX ADMIN — FERROUS SULFATE TAB 325 MG (65 MG ELEMENTAL FE) 325 MG: 325 (65 FE) TAB at 17:24

## 2025-02-27 RX ADMIN — EMPAGLIFLOZIN 10 MG: 10 TABLET, FILM COATED ORAL at 08:35

## 2025-02-27 RX ADMIN — DILTIAZEM HYDROCHLORIDE 30 MG: 30 TABLET, FILM COATED ORAL at 13:08

## 2025-02-27 RX ADMIN — BUDESONIDE INHALATION 500 MCG: 0.5 SUSPENSION RESPIRATORY (INHALATION) at 07:48

## 2025-02-27 RX ADMIN — Medication 6 MG: at 20:37

## 2025-02-27 RX ADMIN — BUDESONIDE INHALATION 500 MCG: 0.5 SUSPENSION RESPIRATORY (INHALATION) at 21:59

## 2025-02-27 RX ADMIN — DILTIAZEM HYDROCHLORIDE 30 MG: 30 TABLET, FILM COATED ORAL at 17:24

## 2025-02-27 RX ADMIN — ACETAMINOPHEN 650 MG: 325 TABLET ORAL at 13:19

## 2025-02-27 RX ADMIN — ASPIRIN 81 MG: 81 TABLET, CHEWABLE ORAL at 08:36

## 2025-02-27 RX ADMIN — AMIODARONE HYDROCHLORIDE 100 MG: 200 TABLET ORAL at 08:36

## 2025-02-27 RX ADMIN — IPRATROPIUM BROMIDE AND ALBUTEROL SULFATE 1 DOSE: 2.5; .5 SOLUTION RESPIRATORY (INHALATION) at 07:50

## 2025-02-27 RX ADMIN — SODIUM CHLORIDE, PRESERVATIVE FREE 10 ML: 5 INJECTION INTRAVENOUS at 20:39

## 2025-02-27 RX ADMIN — BUMETANIDE 2 MG: 1 TABLET ORAL at 17:24

## 2025-02-27 RX ADMIN — ACETAMINOPHEN 650 MG: 325 TABLET ORAL at 20:36

## 2025-02-27 RX ADMIN — TAMSULOSIN HYDROCHLORIDE 0.4 MG: 0.4 CAPSULE ORAL at 17:28

## 2025-02-27 RX ADMIN — BUMETANIDE 2 MG: 1 TABLET ORAL at 13:08

## 2025-02-27 RX ADMIN — FERROUS SULFATE TAB 325 MG (65 MG ELEMENTAL FE) 325 MG: 325 (65 FE) TAB at 08:35

## 2025-02-27 RX ADMIN — METOPROLOL SUCCINATE 50 MG: 50 TABLET, EXTENDED RELEASE ORAL at 08:35

## 2025-02-27 RX ADMIN — HYDROXYZINE PAMOATE 50 MG: 25 CAPSULE ORAL at 20:37

## 2025-02-27 RX ADMIN — ACETAMINOPHEN 650 MG: 325 TABLET ORAL at 03:48

## 2025-02-27 ASSESSMENT — PAIN DESCRIPTION - DESCRIPTORS
DESCRIPTORS: STABBING
DESCRIPTORS: ACHING;SORE

## 2025-02-27 ASSESSMENT — PAIN SCALES - GENERAL
PAINLEVEL_OUTOF10: 7
PAINLEVEL_OUTOF10: 3
PAINLEVEL_OUTOF10: 0

## 2025-02-27 ASSESSMENT — PAIN - FUNCTIONAL ASSESSMENT: PAIN_FUNCTIONAL_ASSESSMENT: NONE - DENIES PAIN

## 2025-02-27 ASSESSMENT — PAIN DESCRIPTION - ORIENTATION
ORIENTATION: LOWER
ORIENTATION: LOWER

## 2025-02-27 ASSESSMENT — PAIN DESCRIPTION - LOCATION
LOCATION: BACK
LOCATION: ABDOMEN;BACK

## 2025-02-27 NOTE — OP NOTE
Title: Ultrasound guided paracentesis.      History: 64-year-old male with cirrhosis, ascites, acute renal failure, renal  mass.    :  Bri Flores PA-C    Supervising Physician: Marquis Roblero MD.   The physician attests to  supervising this procedure and agrees with the report as written.    Consent:  Informed written and oral consent was obtained from the patient after  the risks and benefits were discussed.  All questions were answered and the  patient requested that we proceed.    Procedure:  Maximal sterile barrier technique was used.  Following routine prep  and drape of the abdomen, a local field block with 1% lidocaine was achieved.  Ultrasound evaluation was performed.  A permanent, hardcopy ultrasound images  was stored in PACS.    Fluid was identified in the peritoneal cavity. Using real-time ultrasound  guidance, the peritoneal cavity was accessed with an 8 Georgian centesis catheter.   The catheter was connected to wall suction.    A total of 4700 cc of blood-tinged fluid was removed. The catheter was removed  and a bandage applied.    Complications: None.    Specimen: None.    Findings: Large volume ascites.   Impression:     Uncomplicated ultrasound guided paracentesis.

## 2025-02-27 NOTE — CONSULTS
[facility] Consultation    Admission Date:  2/17/2025    Admission Diagnosis:  Shortness of breath [R06.02]  Anasarca [R60.1]  Penile edema [N48.89]  Acute on chronic respiratory failure with hypoxia and hypercapnia [J96.21, J96.22]  Hypervolemia, unspecified hypervolemia type [E87.70]  Acute hypoxemic respiratory failure [J96.01]  Acute kidney injury superimposed on CKD (HCC) [N17.9, N18.9]    History of Present Illness:  Tay Conklin is a 64 y.o. male with prior medical history of severe COPD, HTN, Afib/AFL, severe TR, pHTN, LILA on CPAP(somewhat compliant). The pt is accompanied by his family and states that for the last week he has had worsening BABIN with constant chest pressure that is worse when laying down and improves with sitting up, no other CP or radiation. Compliant with his Lasix BID and anticipates he has gained up to 40-50lbs in the last several months.      Workup in the ED revealed: 64% on RA, WBC 16.0, Hgb 14.7, K 4.8, Cr 2.27, Mag 2.6, Procal 0.12, Trop 40, NT Pro-BNP 1943, CRP 18.3. LA 1.3. CXR w/ enlarged heart. The pt was placed on BiPAP and admitted for concern for edema and CHF.  Also noted to have DONYA on admission.    Past Medical History:   Diagnosis Date    COPD (chronic obstructive pulmonary disease) (HCC)       Past Surgical History:   Procedure Laterality Date    COLONOSCOPY N/A 3/5/2024    COLONOSCOPY DIAGNOSTIC performed by Tono Napoles MD at [facility] ENDOSCOPY    UPPER GASTROINTESTINAL ENDOSCOPY N/A 3/5/2024    ESOPHAGOGASTRODUODENOSCOPY performed by Tono Napoles MD at [facility] ENDOSCOPY      Current Facility-Administered Medications   Medication Dose Route Frequency    bumetanide (BUMEX) 12.5 mg in 50 mL infusion  0.5 mg/hr IntraVENous Continuous    diatrizoate meglumine-sodium (GASTROGRAFIN) 66-10 % solution 15 mL  15 mL Oral ONCE PRN    sodium chloride flush 0.9 % injection 5-40 mL  5-40 mL IntraVENous 2 times per day    sodium chloride flush 0.9 % injection 5-40 mL  
      [facility] Initial Cardiac Evaluation      Date of  Admission: 2/17/2025 12:42 PM     Primary Care Physician: Malu Asher PA-C  Primary Cardiologist: Dr. Broderick  Referring Physician: Dr. Fernandez  Supervising Physician: Dr. Dolan    CC/Reason for evaluation: edema, DONYA    HPI:  Tay Conklin is a 64 y.o. male with prior medical history of severe COPD, HTN, Afib/AFL, severe TR, pHTN, LILA on CPAP(somewhat compliant). The pt is accompanied by his family and states that for the last week he has had worsening BABIN with constant chest pressure that is worse when laying down and improves with sitting up, no other CP or radiation. Compliant with his Lasix BID and anticipates he has gained up to 40-50lbs in the last several months.     Workup in the ED revealed: 64% on RA, WBC 16.0, Hgb 14.7, K 4.8, Cr 2.27, Mag 2.6, Procal 0.12, Trop 40, NT Pro-BNP 1943, CRP 18.3. LA 1.3. CXR w/ enlarged heart. The pt was placed on BiPAP and given concern for edema and CHF Cardiology consulted for further recommendations.       Recent Cardiac Synopsis:  05/23/24  NADINE W/ POSSIBLE CARDIOVERSION (PRN CONTRAST/BUBBLE/3D) 05/24/2024  1:35 PM (Final)    Interpretation Summary    Left Ventricle: Normal left ventricular systolic function with a visually estimated EF of 55 - 60%. Left ventricle size is normal. Normal wall thickness. Septal flattening in diastole and systole consistent with right ventricular volume and pressure overload. Normal wall motion. Indeterminate diastolic function due to arrhythmia.    Right Ventricle: Right ventricle is mildly dilated. Moderately reduced systolic function.    Mitral Valve: Mild regurgitation.    Tricuspid Valve: Moderate regurgitation.    Left Atrium: Left atrium is moderately dilated.  No evidence of thrombus within the left atrium.  No left atrial appendage thrombus noted. No left atrial appendage mass noted.    Interatrial Septum: Hypermobile interatrial septum. The septum is 
Consult Note            Date:2/19/2025        Patient Name:Tay Conklin     YOB: 1960     Age:64 y.o.    Inpatient consult to GI  Consult performed by: Vivienne Latif APRN - CNP  Consult ordered by: Toney Tripp MD          Chief Complaint     Chief Complaint   Patient presents with    Groin Swelling    Leg Swelling    Shortness of Breath        History Obtained From   patient    History of Present Illness   Tay Conklin is a 63 yo male who presented to the ED with ongoing shortness of breath. PMH includes COPD, smoking, PHTN, a. Fib, LILA, and renal mass. GI was consulted for reported cirrhotic liver. Patient had CT AP on 2/17 that showed moderate to large ascites as well as cirrhosis. Patient had paracentesis yesterday draining 5500cc of fluid off his abdomen. He states this is not the first time he has had to have his stomach drained. He denies any history of liver disease though. He currently denies any nausea, vomiting, abdominal pain, bloody or black stools, or NSAIDs. He use to be a daily drinker as well as smoker but as quit both. He does take Eliquis daily. TB/LFT's normal.     Labs: Bili 0.5, ALT 12, AST 15, Alk Phos 81, Albumin 2.6, Plt 406, Hgb 12.4, INR 1.8    Medications: Eliquis, aldactone    Imaging: IR US GUIDED PARACENTESIS    Result Date: 2/18/2025  Uncomplicated ultrasound guided paracentesis.     CT CHEST ABDOMEN PELVIS W CONTRAST Additional Contrast? Oral    Result Date: 2/17/2025  1. Umbilical hernia containing 10 cm of fat and ascites, the hernia defect itself measuring 3 x 2.4 cm. No bowel involvement. 2. Mild pulmonary edema with superimposed right lower lobe pneumonia. Tiny right pleural effusion. 3. Cirrhosis with moderate/large ascites. Body wall edema/anasarca. 4. Upper right renal pole mass measuring 5.5 cm, renal cell carcinoma is not excluded. No comparison imaging. Follow-up CT renal mass protocol is recommended. 5. No bowel obstruction or ileus. Colonic 
Paracentesis completed 2/18/25  
Paracentesis completed.  
Pt seen by GI  
hg, severe tricuspid regurgitation. He continues to work. He is accompanied by his ex-wife and daughter. We were asked to admit him for acute respiratory failure with gross anasarca, DONYA, PHTN, and cor pulmonale.      In the ED, urology saw him for yadav catheter insertion but were unable without a scope per staff.     Review of Systems: Comprehensive ROS negative except in HPI    Current Outpatient Medications   Medication Instructions    amiodarone (PACERONE) 100 mg, Oral, EVERY 12 HOURS SCHEDULED    apixaban (ELIQUIS) 5 mg, Oral, 2 TIMES DAILY    aspirin 81 mg, Oral, DAILY    budesonide-formoterol (SYMBICORT) 160-4.5 MCG/ACT AERO 2 puffs, Inhalation    dapagliflozin (FARXIGA) 10 mg, Oral, EVERY MORNING    dilTIAZem (DILACOR XR) 120 mg, Oral, EVERY MORNING    ferrous sulfate (IRON 325) 325 mg, Oral, 2 TIMES DAILY    Fexofenadine HCl (ALLEGRA PO) 10 mg, Oral, EVERY MORNING    Fluticasone-Salmeterol 232-14 MCG/ACT AEPB 1 puff, Inhalation, 2 times daily    furosemide (LASIX) 40 mg, Oral, 2 TIMES DAILY    MAGNESIUM GLYCINATE PO 80 mg, Oral, NIGHTLY    magnesium oxide (MAG-OX) 400 mg, Oral, 2 TIMES DAILY    metoprolol succinate (TOPROL XL) 100 mg, Oral, DAILY    Multiple Vitamin (ONE-DAILY MULTI-VITAMIN PO) 1 Dose, Oral, DAILY    OXYGEN Inhalation, PRN, 3L    tamsulosin (FLOMAX) 0.4 MG capsule TAKE 1 CAPSULE BY MOUTH EVERY DAY AT NIGHT    tiotropium (SPIRIVA RESPIMAT) 2.5 MCG/ACT AERS inhaler 2 puffs, Inhalation, DAILY RESP      Past Medical History:   Diagnosis Date    COPD (chronic obstructive pulmonary disease) (HCC)      Past Surgical History:   Procedure Laterality Date    COLONOSCOPY N/A 3/5/2024    COLONOSCOPY DIAGNOSTIC performed by Tono Napoles MD at [facility] ENDOSCOPY    UPPER GASTROINTESTINAL ENDOSCOPY N/A 3/5/2024    ESOPHAGOGASTRODUODENOSCOPY performed by Tono Napoles MD at [facility] ENDOSCOPY     Social History     Socioeconomic History    Marital status:      Spouse name: Not on file    Number of 
ventricular response noted during the study.  -Findings are suggestive of cor pulmonale    Signed by: Mikel Barr MD on 2/18/2025  8:03 AM      Current Facility-Administered Medications   Medication Dose Route Frequency    midodrine (PROAMATINE) tablet 2.5 mg  2.5 mg Oral TID WC    bumetanide (BUMEX) 12.5 mg in 50 mL infusion  0.5 mg/hr IntraVENous Continuous    ipratropium 0.5 mg-albuterol 2.5 mg (DUONEB) nebulizer solution 1 Dose  1 Dose Inhalation Q6H WA RT    methylPREDNISolone sodium succ (SOLU-MEDROL) 40 mg in sterile water 1 mL injection  40 mg IntraVENous Q12H    OLANZapine zydis (ZYPREXA) disintegrating tablet 2.5 mg  2.5 mg Oral BID PRN    metoprolol succinate (TOPROL XL) extended release tablet 50 mg  50 mg Oral Daily    diphenhydrAMINE (BENADRYL) injection 25 mg  25 mg IntraVENous Q6H PRN    melatonin tablet 6 mg  6 mg Oral Nightly PRN    sodium chloride flush 0.9 % injection 5-40 mL  5-40 mL IntraVENous 2 times per day    sodium chloride flush 0.9 % injection 5-40 mL  5-40 mL IntraVENous PRN    0.9 % sodium chloride infusion   IntraVENous Continuous    acetaminophen (TYLENOL) tablet 650 mg  650 mg Oral Q4H PRN    diatrizoate meglumine-sodium (GASTROGRAFIN) 66-10 % solution 15 mL  15 mL Oral ONCE PRN    sodium chloride flush 0.9 % injection 5-40 mL  5-40 mL IntraVENous 2 times per day    sodium chloride flush 0.9 % injection 5-40 mL  5-40 mL IntraVENous PRN    0.9 % sodium chloride infusion   IntraVENous PRN    potassium chloride 20 mEq/50 mL IVPB (Central Line)  20 mEq IntraVENous PRN    Or    potassium chloride 10 mEq/100 mL IVPB (Peripheral Line)  10 mEq IntraVENous PRN    magnesium sulfate 2000 mg in 50 mL IVPB premix  2,000 mg IntraVENous PRN    ondansetron (ZOFRAN-ODT) disintegrating tablet 4 mg  4 mg Oral Q8H PRN    Or    ondansetron (ZOFRAN) injection 4 mg  4 mg IntraVENous Q6H PRN    polyethylene glycol (GLYCOLAX) packet 17 g  17 g Oral Daily PRN    acetaminophen (TYLENOL) suppository

## 2025-02-28 ENCOUNTER — APPOINTMENT (OUTPATIENT)
Dept: ULTRASOUND IMAGING | Age: 65
DRG: 871 | End: 2025-02-28
Payer: COMMERCIAL

## 2025-02-28 LAB
ALBUMIN SERPL-MCNC: 2.8 G/DL (ref 3.2–4.6)
ALBUMIN/GLOB SERPL: 0.9 (ref 1–1.9)
ALP SERPL-CCNC: 82 U/L (ref 40–129)
ALT SERPL-CCNC: 39 U/L (ref 8–55)
AMMONIA PLAS-SCNC: 15 UMOL/L (ref 16–60)
ANION GAP SERPL CALC-SCNC: 7 MMOL/L (ref 7–16)
AST SERPL-CCNC: 26 U/L (ref 15–37)
BILIRUB DIRECT SERPL-MCNC: 0.6 MG/DL (ref 0–0.4)
BILIRUB SERPL-MCNC: 1.2 MG/DL (ref 0–1.2)
BUN SERPL-MCNC: 39 MG/DL (ref 8–23)
CALCIUM SERPL-MCNC: 9.2 MG/DL (ref 8.8–10.2)
CHLORIDE SERPL-SCNC: 92 MMOL/L (ref 98–107)
CO2 SERPL-SCNC: 43 MMOL/L (ref 20–29)
CREAT SERPL-MCNC: 1.39 MG/DL (ref 0.8–1.3)
ERYTHROCYTE [DISTWIDTH] IN BLOOD BY AUTOMATED COUNT: 21.2 % (ref 11.9–14.6)
GLOBULIN SER CALC-MCNC: 3.1 G/DL (ref 2.3–3.5)
GLUCOSE SERPL-MCNC: 108 MG/DL (ref 70–99)
HCT VFR BLD AUTO: 31 % (ref 41.1–50.3)
HGB BLD-MCNC: 9.2 G/DL (ref 13.6–17.2)
MCH RBC QN AUTO: 26.2 PG (ref 26.1–32.9)
MCHC RBC AUTO-ENTMCNC: 29.7 G/DL (ref 31.4–35)
MCV RBC AUTO: 88.3 FL (ref 82–102)
NRBC # BLD: 0 K/UL (ref 0–0.2)
PLATELET # BLD AUTO: 248 K/UL (ref 150–450)
PMV BLD AUTO: 10.5 FL (ref 9.4–12.3)
POTASSIUM SERPL-SCNC: 3.7 MMOL/L (ref 3.5–5.1)
PROT SERPL-MCNC: 5.8 G/DL (ref 6.3–8.2)
RBC # BLD AUTO: 3.51 M/UL (ref 4.23–5.6)
SODIUM SERPL-SCNC: 142 MMOL/L (ref 136–145)
WBC # BLD AUTO: 15.3 K/UL (ref 4.3–11.1)

## 2025-02-28 PROCEDURE — 80048 BASIC METABOLIC PNL TOTAL CA: CPT

## 2025-02-28 PROCEDURE — 6370000000 HC RX 637 (ALT 250 FOR IP): Performed by: INTERNAL MEDICINE

## 2025-02-28 PROCEDURE — 86803 HEPATITIS C AB TEST: CPT

## 2025-02-28 PROCEDURE — 6370000000 HC RX 637 (ALT 250 FOR IP): Performed by: STUDENT IN AN ORGANIZED HEALTH CARE EDUCATION/TRAINING PROGRAM

## 2025-02-28 PROCEDURE — 85027 COMPLETE CBC AUTOMATED: CPT

## 2025-02-28 PROCEDURE — 82140 ASSAY OF AMMONIA: CPT

## 2025-02-28 PROCEDURE — 94761 N-INVAS EAR/PLS OXIMETRY MLT: CPT

## 2025-02-28 PROCEDURE — 80076 HEPATIC FUNCTION PANEL: CPT

## 2025-02-28 PROCEDURE — 6370000000 HC RX 637 (ALT 250 FOR IP)

## 2025-02-28 PROCEDURE — 2060000000 HC ICU INTERMEDIATE R&B

## 2025-02-28 PROCEDURE — 93975 VASCULAR STUDY: CPT | Performed by: RADIOLOGY

## 2025-02-28 PROCEDURE — 99232 SBSQ HOSP IP/OBS MODERATE 35: CPT | Performed by: INTERNAL MEDICINE

## 2025-02-28 PROCEDURE — 87350 HEPATITIS BE AG IA: CPT

## 2025-02-28 PROCEDURE — 36415 COLL VENOUS BLD VENIPUNCTURE: CPT

## 2025-02-28 PROCEDURE — 93975 VASCULAR STUDY: CPT

## 2025-02-28 PROCEDURE — 6360000002 HC RX W HCPCS: Performed by: INTERNAL MEDICINE

## 2025-02-28 PROCEDURE — 2500000003 HC RX 250 WO HCPCS: Performed by: INTERNAL MEDICINE

## 2025-02-28 PROCEDURE — 86704 HEP B CORE ANTIBODY TOTAL: CPT

## 2025-02-28 PROCEDURE — 2700000000 HC OXYGEN THERAPY PER DAY

## 2025-02-28 PROCEDURE — 94660 CPAP INITIATION&MGMT: CPT

## 2025-02-28 PROCEDURE — 94640 AIRWAY INHALATION TREATMENT: CPT

## 2025-02-28 RX ORDER — MIDODRINE HYDROCHLORIDE 5 MG/1
10 TABLET ORAL
Status: DISCONTINUED | OUTPATIENT
Start: 2025-02-28 | End: 2025-03-02 | Stop reason: HOSPADM

## 2025-02-28 RX ADMIN — IPRATROPIUM BROMIDE AND ALBUTEROL SULFATE 1 DOSE: 2.5; .5 SOLUTION RESPIRATORY (INHALATION) at 19:35

## 2025-02-28 RX ADMIN — DILTIAZEM HYDROCHLORIDE 30 MG: 30 TABLET, FILM COATED ORAL at 13:17

## 2025-02-28 RX ADMIN — EMPAGLIFLOZIN 10 MG: 10 TABLET, FILM COATED ORAL at 09:51

## 2025-02-28 RX ADMIN — MIDODRINE HYDROCHLORIDE 10 MG: 5 TABLET ORAL at 18:15

## 2025-02-28 RX ADMIN — FERROUS SULFATE TAB 325 MG (65 MG ELEMENTAL FE) 325 MG: 325 (65 FE) TAB at 09:51

## 2025-02-28 RX ADMIN — HYDROXYZINE PAMOATE 50 MG: 25 CAPSULE ORAL at 20:45

## 2025-02-28 RX ADMIN — APIXABAN 5 MG: 5 TABLET, FILM COATED ORAL at 10:03

## 2025-02-28 RX ADMIN — SODIUM CHLORIDE, PRESERVATIVE FREE 5 ML: 5 INJECTION INTRAVENOUS at 20:45

## 2025-02-28 RX ADMIN — BUDESONIDE INHALATION 500 MCG: 0.5 SUSPENSION RESPIRATORY (INHALATION) at 08:01

## 2025-02-28 RX ADMIN — ACETAMINOPHEN 650 MG: 325 TABLET ORAL at 18:16

## 2025-02-28 RX ADMIN — MIDODRINE HYDROCHLORIDE 10 MG: 5 TABLET ORAL at 13:16

## 2025-02-28 RX ADMIN — Medication 6 MG: at 21:18

## 2025-02-28 RX ADMIN — TAMSULOSIN HYDROCHLORIDE 0.4 MG: 0.4 CAPSULE ORAL at 09:51

## 2025-02-28 RX ADMIN — SODIUM CHLORIDE, PRESERVATIVE FREE 10 ML: 5 INJECTION INTRAVENOUS at 09:55

## 2025-02-28 RX ADMIN — ACETAMINOPHEN 650 MG: 325 TABLET ORAL at 21:18

## 2025-02-28 RX ADMIN — FERROUS SULFATE TAB 325 MG (65 MG ELEMENTAL FE) 325 MG: 325 (65 FE) TAB at 18:16

## 2025-02-28 RX ADMIN — DILTIAZEM HYDROCHLORIDE 30 MG: 30 TABLET, FILM COATED ORAL at 00:43

## 2025-02-28 RX ADMIN — SPIRONOLACTONE 100 MG: 25 TABLET ORAL at 10:00

## 2025-02-28 RX ADMIN — ASPIRIN 81 MG: 81 TABLET, CHEWABLE ORAL at 09:51

## 2025-02-28 RX ADMIN — BUMETANIDE 2 MG: 1 TABLET ORAL at 09:51

## 2025-02-28 RX ADMIN — DILTIAZEM HYDROCHLORIDE 30 MG: 30 TABLET, FILM COATED ORAL at 18:15

## 2025-02-28 RX ADMIN — AMIODARONE HYDROCHLORIDE 100 MG: 200 TABLET ORAL at 09:58

## 2025-02-28 RX ADMIN — BUDESONIDE INHALATION 500 MCG: 0.5 SUSPENSION RESPIRATORY (INHALATION) at 19:35

## 2025-02-28 RX ADMIN — BUMETANIDE 2 MG: 1 TABLET ORAL at 18:15

## 2025-02-28 RX ADMIN — METOPROLOL SUCCINATE 50 MG: 50 TABLET, EXTENDED RELEASE ORAL at 10:01

## 2025-02-28 RX ADMIN — IPRATROPIUM BROMIDE AND ALBUTEROL SULFATE 1 DOSE: 2.5; .5 SOLUTION RESPIRATORY (INHALATION) at 08:01

## 2025-02-28 RX ADMIN — IPRATROPIUM BROMIDE AND ALBUTEROL SULFATE 1 DOSE: 2.5; .5 SOLUTION RESPIRATORY (INHALATION) at 15:23

## 2025-02-28 ASSESSMENT — PAIN SCALES - GENERAL
PAINLEVEL_OUTOF10: 0
PAINLEVEL_OUTOF10: 3
PAINLEVEL_OUTOF10: 0
PAINLEVEL_OUTOF10: 0

## 2025-03-01 ENCOUNTER — APPOINTMENT (OUTPATIENT)
Dept: GENERAL RADIOLOGY | Age: 65
DRG: 871 | End: 2025-03-01
Payer: COMMERCIAL

## 2025-03-01 LAB
ANION GAP SERPL CALC-SCNC: 10 MMOL/L (ref 7–16)
B PERT DNA SPEC QL NAA+PROBE: NOT DETECTED
BORDETELLA PARAPERTUSSIS BY PCR: NOT DETECTED
BUN SERPL-MCNC: 40 MG/DL (ref 8–23)
C PNEUM DNA SPEC QL NAA+PROBE: NOT DETECTED
CALCIUM SERPL-MCNC: 9.1 MG/DL (ref 8.8–10.2)
CHLORIDE SERPL-SCNC: 92 MMOL/L (ref 98–107)
CO2 SERPL-SCNC: 37 MMOL/L (ref 20–29)
CREAT SERPL-MCNC: 1.49 MG/DL (ref 0.8–1.3)
ERYTHROCYTE [DISTWIDTH] IN BLOOD BY AUTOMATED COUNT: 21.2 % (ref 11.9–14.6)
FLUAV SUBTYP SPEC NAA+PROBE: NOT DETECTED
FLUBV RNA SPEC QL NAA+PROBE: NOT DETECTED
GLUCOSE SERPL-MCNC: 104 MG/DL (ref 70–99)
HADV DNA SPEC QL NAA+PROBE: NOT DETECTED
HBV CORE AB SERPL QL IA: NEGATIVE
HBV E AG SERPL QL IA: NEGATIVE
HCOV 229E RNA SPEC QL NAA+PROBE: NOT DETECTED
HCOV HKU1 RNA SPEC QL NAA+PROBE: NOT DETECTED
HCOV NL63 RNA SPEC QL NAA+PROBE: NOT DETECTED
HCOV OC43 RNA SPEC QL NAA+PROBE: NOT DETECTED
HCT VFR BLD AUTO: 31.4 % (ref 41.1–50.3)
HCV AB SERPL QL IA: NORMAL
HCV IGG SERPL QL IA: NON REACTIVE S/CO RATIO
HGB BLD-MCNC: 9.6 G/DL (ref 13.6–17.2)
HMPV RNA SPEC QL NAA+PROBE: NOT DETECTED
HPIV1 RNA SPEC QL NAA+PROBE: NOT DETECTED
HPIV2 RNA SPEC QL NAA+PROBE: NOT DETECTED
HPIV3 RNA SPEC QL NAA+PROBE: NOT DETECTED
HPIV4 RNA SPEC QL NAA+PROBE: NOT DETECTED
M PNEUMO DNA SPEC QL NAA+PROBE: NOT DETECTED
MAGNESIUM SERPL-MCNC: 1.9 MG/DL (ref 1.8–2.4)
MCH RBC QN AUTO: 27 PG (ref 26.1–32.9)
MCHC RBC AUTO-ENTMCNC: 30.6 G/DL (ref 31.4–35)
MCV RBC AUTO: 88.2 FL (ref 82–102)
NRBC # BLD: 0 K/UL (ref 0–0.2)
PLATELET # BLD AUTO: 269 K/UL (ref 150–450)
PMV BLD AUTO: 11 FL (ref 9.4–12.3)
POTASSIUM SERPL-SCNC: 4.2 MMOL/L (ref 3.5–5.1)
PROCALCITONIN SERPL-MCNC: 0.19 NG/ML (ref 0–0.1)
RBC # BLD AUTO: 3.56 M/UL (ref 4.23–5.6)
RSV RNA SPEC QL NAA+PROBE: NOT DETECTED
RV+EV RNA SPEC QL NAA+PROBE: NOT DETECTED
SARS-COV-2 RNA RESP QL NAA+PROBE: NOT DETECTED
SODIUM SERPL-SCNC: 139 MMOL/L (ref 136–145)
WBC # BLD AUTO: 19.7 K/UL (ref 4.3–11.1)

## 2025-03-01 PROCEDURE — 83735 ASSAY OF MAGNESIUM: CPT

## 2025-03-01 PROCEDURE — 94660 CPAP INITIATION&MGMT: CPT

## 2025-03-01 PROCEDURE — 6370000000 HC RX 637 (ALT 250 FOR IP): Performed by: INTERNAL MEDICINE

## 2025-03-01 PROCEDURE — 2060000000 HC ICU INTERMEDIATE R&B

## 2025-03-01 PROCEDURE — 6360000002 HC RX W HCPCS: Performed by: INTERNAL MEDICINE

## 2025-03-01 PROCEDURE — 71045 X-RAY EXAM CHEST 1 VIEW: CPT

## 2025-03-01 PROCEDURE — 97530 THERAPEUTIC ACTIVITIES: CPT

## 2025-03-01 PROCEDURE — 99232 SBSQ HOSP IP/OBS MODERATE 35: CPT | Performed by: INTERNAL MEDICINE

## 2025-03-01 PROCEDURE — 94760 N-INVAS EAR/PLS OXIMETRY 1: CPT

## 2025-03-01 PROCEDURE — 36415 COLL VENOUS BLD VENIPUNCTURE: CPT

## 2025-03-01 PROCEDURE — 0202U NFCT DS 22 TRGT SARS-COV-2: CPT

## 2025-03-01 PROCEDURE — 94640 AIRWAY INHALATION TREATMENT: CPT

## 2025-03-01 PROCEDURE — 94761 N-INVAS EAR/PLS OXIMETRY MLT: CPT

## 2025-03-01 PROCEDURE — 2500000003 HC RX 250 WO HCPCS: Performed by: INTERNAL MEDICINE

## 2025-03-01 PROCEDURE — 84145 PROCALCITONIN (PCT): CPT

## 2025-03-01 PROCEDURE — 6370000000 HC RX 637 (ALT 250 FOR IP)

## 2025-03-01 PROCEDURE — 2700000000 HC OXYGEN THERAPY PER DAY

## 2025-03-01 PROCEDURE — 80048 BASIC METABOLIC PNL TOTAL CA: CPT

## 2025-03-01 PROCEDURE — 85027 COMPLETE CBC AUTOMATED: CPT

## 2025-03-01 PROCEDURE — 97535 SELF CARE MNGMENT TRAINING: CPT

## 2025-03-01 RX ORDER — IPRATROPIUM BROMIDE AND ALBUTEROL SULFATE 2.5; .5 MG/3ML; MG/3ML
1 SOLUTION RESPIRATORY (INHALATION)
Status: DISCONTINUED | OUTPATIENT
Start: 2025-03-01 | End: 2025-03-02 | Stop reason: HOSPADM

## 2025-03-01 RX ADMIN — IPRATROPIUM BROMIDE AND ALBUTEROL SULFATE 1 DOSE: 2.5; .5 SOLUTION RESPIRATORY (INHALATION) at 07:22

## 2025-03-01 RX ADMIN — DILTIAZEM HYDROCHLORIDE 30 MG: 30 TABLET, FILM COATED ORAL at 02:34

## 2025-03-01 RX ADMIN — ACETAMINOPHEN 650 MG: 325 TABLET ORAL at 21:06

## 2025-03-01 RX ADMIN — SODIUM CHLORIDE, PRESERVATIVE FREE 5 ML: 5 INJECTION INTRAVENOUS at 09:52

## 2025-03-01 RX ADMIN — FERROUS SULFATE TAB 325 MG (65 MG ELEMENTAL FE) 325 MG: 325 (65 FE) TAB at 16:39

## 2025-03-01 RX ADMIN — DILTIAZEM HYDROCHLORIDE 30 MG: 30 TABLET, FILM COATED ORAL at 15:08

## 2025-03-01 RX ADMIN — FERROUS SULFATE TAB 325 MG (65 MG ELEMENTAL FE) 325 MG: 325 (65 FE) TAB at 08:47

## 2025-03-01 RX ADMIN — BUMETANIDE 2 MG: 1 TABLET ORAL at 17:53

## 2025-03-01 RX ADMIN — TAMSULOSIN HYDROCHLORIDE 0.4 MG: 0.4 CAPSULE ORAL at 09:38

## 2025-03-01 RX ADMIN — APIXABAN 5 MG: 5 TABLET, FILM COATED ORAL at 21:06

## 2025-03-01 RX ADMIN — ACETAMINOPHEN 650 MG: 325 TABLET ORAL at 06:21

## 2025-03-01 RX ADMIN — MIDODRINE HYDROCHLORIDE 10 MG: 5 TABLET ORAL at 12:24

## 2025-03-01 RX ADMIN — AMIODARONE HYDROCHLORIDE 100 MG: 200 TABLET ORAL at 09:36

## 2025-03-01 RX ADMIN — MIDODRINE HYDROCHLORIDE 10 MG: 5 TABLET ORAL at 08:46

## 2025-03-01 RX ADMIN — SODIUM CHLORIDE, PRESERVATIVE FREE 5 ML: 5 INJECTION INTRAVENOUS at 21:07

## 2025-03-01 RX ADMIN — BUDESONIDE INHALATION 500 MCG: 0.5 SUSPENSION RESPIRATORY (INHALATION) at 22:14

## 2025-03-01 RX ADMIN — HYDROXYZINE PAMOATE 50 MG: 25 CAPSULE ORAL at 21:07

## 2025-03-01 RX ADMIN — IPRATROPIUM BROMIDE AND ALBUTEROL SULFATE 1 DOSE: 2.5; .5 SOLUTION RESPIRATORY (INHALATION) at 16:22

## 2025-03-01 RX ADMIN — MIDODRINE HYDROCHLORIDE 10 MG: 5 TABLET ORAL at 16:39

## 2025-03-01 RX ADMIN — DILTIAZEM HYDROCHLORIDE 30 MG: 30 TABLET, FILM COATED ORAL at 09:50

## 2025-03-01 RX ADMIN — BUDESONIDE INHALATION 500 MCG: 0.5 SUSPENSION RESPIRATORY (INHALATION) at 07:22

## 2025-03-01 RX ADMIN — SODIUM CHLORIDE, PRESERVATIVE FREE 5 ML: 5 INJECTION INTRAVENOUS at 21:08

## 2025-03-01 RX ADMIN — IPRATROPIUM BROMIDE AND ALBUTEROL SULFATE 1 DOSE: 2.5; .5 SOLUTION RESPIRATORY (INHALATION) at 22:15

## 2025-03-01 RX ADMIN — Medication 6 MG: at 21:06

## 2025-03-01 RX ADMIN — DILTIAZEM HYDROCHLORIDE 30 MG: 30 TABLET, FILM COATED ORAL at 21:07

## 2025-03-01 RX ADMIN — ASPIRIN 81 MG: 81 TABLET, CHEWABLE ORAL at 08:47

## 2025-03-01 RX ADMIN — SPIRONOLACTONE 100 MG: 25 TABLET ORAL at 09:43

## 2025-03-01 RX ADMIN — BUMETANIDE 2 MG: 1 TABLET ORAL at 09:42

## 2025-03-01 RX ADMIN — EMPAGLIFLOZIN 10 MG: 10 TABLET, FILM COATED ORAL at 09:36

## 2025-03-01 RX ADMIN — HYDROXYZINE PAMOATE 50 MG: 25 CAPSULE ORAL at 02:40

## 2025-03-01 ASSESSMENT — PAIN SCALES - GENERAL
PAINLEVEL_OUTOF10: 3
PAINLEVEL_OUTOF10: 0
PAINLEVEL_OUTOF10: 2
PAINLEVEL_OUTOF10: 4
PAINLEVEL_OUTOF10: 0
PAINLEVEL_OUTOF10: 0

## 2025-03-01 ASSESSMENT — PAIN DESCRIPTION - DESCRIPTORS
DESCRIPTORS: ACHING;DISCOMFORT
DESCRIPTORS: ACHING

## 2025-03-01 ASSESSMENT — PAIN DESCRIPTION - LOCATION
LOCATION: BACK;HEAD
LOCATION: BACK

## 2025-03-02 VITALS
HEART RATE: 102 BPM | TEMPERATURE: 98.1 F | SYSTOLIC BLOOD PRESSURE: 93 MMHG | RESPIRATION RATE: 116 BRPM | WEIGHT: 273.59 LBS | DIASTOLIC BLOOD PRESSURE: 60 MMHG | BODY MASS INDEX: 35.11 KG/M2 | OXYGEN SATURATION: 95 % | HEIGHT: 74 IN

## 2025-03-02 PROBLEM — J96.21 ACUTE ON CHRONIC RESPIRATORY FAILURE WITH HYPOXIA AND HYPERCAPNIA (HCC): Status: RESOLVED | Noted: 2025-02-17 | Resolved: 2025-03-02

## 2025-03-02 PROBLEM — K74.60 CIRRHOSIS (HCC): Status: ACTIVE | Noted: 2025-02-17

## 2025-03-02 PROBLEM — N17.9 ACUTE RENAL FAILURE (ARF): Status: RESOLVED | Noted: 2024-03-01 | Resolved: 2025-03-02

## 2025-03-02 PROBLEM — J96.12 CHRONIC RESPIRATORY FAILURE WITH HYPOXIA AND HYPERCAPNIA: Status: ACTIVE | Noted: 2025-03-02

## 2025-03-02 PROBLEM — K74.60 CIRRHOSIS OF LIVER WITH ASCITES (HCC): Chronic | Status: ACTIVE | Noted: 2025-02-17

## 2025-03-02 PROBLEM — J96.11 CHRONIC RESPIRATORY FAILURE WITH HYPOXIA AND HYPERCAPNIA: Chronic | Status: ACTIVE | Noted: 2025-03-02

## 2025-03-02 PROBLEM — R06.02 SHORTNESS OF BREATH: Status: RESOLVED | Noted: 2025-02-19 | Resolved: 2025-03-02

## 2025-03-02 PROBLEM — R60.1 ANASARCA: Status: RESOLVED | Noted: 2025-02-17 | Resolved: 2025-03-02

## 2025-03-02 PROBLEM — J44.1 COPD EXACERBATION (HCC): Status: RESOLVED | Noted: 2025-02-21 | Resolved: 2025-03-02

## 2025-03-02 PROBLEM — N17.9 ACUTE KIDNEY INJURY SUPERIMPOSED ON CKD: Status: RESOLVED | Noted: 2025-02-17 | Resolved: 2025-03-02

## 2025-03-02 PROBLEM — E87.70 VOLUME OVERLOAD: Status: RESOLVED | Noted: 2025-02-17 | Resolved: 2025-03-02

## 2025-03-02 PROBLEM — E66.01 MORBID OBESITY WITH BMI OF 40.0-44.9, ADULT: Chronic | Status: ACTIVE | Noted: 2025-02-17

## 2025-03-02 PROBLEM — N18.9 ACUTE KIDNEY INJURY SUPERIMPOSED ON CKD: Status: RESOLVED | Noted: 2025-02-17 | Resolved: 2025-03-02

## 2025-03-02 PROBLEM — R18.8 CIRRHOSIS OF LIVER WITH ASCITES (HCC): Chronic | Status: ACTIVE | Noted: 2025-02-17

## 2025-03-02 PROBLEM — J96.11 CHRONIC RESPIRATORY FAILURE WITH HYPOXIA AND HYPERCAPNIA: Status: ACTIVE | Noted: 2025-03-02

## 2025-03-02 PROBLEM — J96.12 CHRONIC RESPIRATORY FAILURE WITH HYPOXIA AND HYPERCAPNIA: Chronic | Status: ACTIVE | Noted: 2025-03-02

## 2025-03-02 PROBLEM — J96.22 ACUTE ON CHRONIC RESPIRATORY FAILURE WITH HYPOXIA AND HYPERCAPNIA: Status: RESOLVED | Noted: 2025-02-17 | Resolved: 2025-03-02

## 2025-03-02 LAB
ANION GAP SERPL CALC-SCNC: 9 MMOL/L (ref 7–16)
BUN SERPL-MCNC: 42 MG/DL (ref 8–23)
CALCIUM SERPL-MCNC: 8.8 MG/DL (ref 8.8–10.2)
CHLORIDE SERPL-SCNC: 92 MMOL/L (ref 98–107)
CO2 SERPL-SCNC: 38 MMOL/L (ref 20–29)
CREAT SERPL-MCNC: 1.68 MG/DL (ref 0.8–1.3)
ERYTHROCYTE [DISTWIDTH] IN BLOOD BY AUTOMATED COUNT: 21.8 % (ref 11.9–14.6)
GLUCOSE SERPL-MCNC: 108 MG/DL (ref 70–99)
HCT VFR BLD AUTO: 30 % (ref 41.1–50.3)
HGB BLD-MCNC: 9.1 G/DL (ref 13.6–17.2)
MAGNESIUM SERPL-MCNC: 2 MG/DL (ref 1.8–2.4)
MCH RBC QN AUTO: 26.8 PG (ref 26.1–32.9)
MCHC RBC AUTO-ENTMCNC: 30.3 G/DL (ref 31.4–35)
MCV RBC AUTO: 88.2 FL (ref 82–102)
NRBC # BLD: 0 K/UL (ref 0–0.2)
PLATELET # BLD AUTO: 304 K/UL (ref 150–450)
PMV BLD AUTO: 10.7 FL (ref 9.4–12.3)
POTASSIUM SERPL-SCNC: 4 MMOL/L (ref 3.5–5.1)
RBC # BLD AUTO: 3.4 M/UL (ref 4.23–5.6)
SODIUM SERPL-SCNC: 139 MMOL/L (ref 136–145)
WBC # BLD AUTO: 19.2 K/UL (ref 4.3–11.1)

## 2025-03-02 PROCEDURE — 6370000000 HC RX 637 (ALT 250 FOR IP): Performed by: STUDENT IN AN ORGANIZED HEALTH CARE EDUCATION/TRAINING PROGRAM

## 2025-03-02 PROCEDURE — 6360000002 HC RX W HCPCS: Performed by: INTERNAL MEDICINE

## 2025-03-02 PROCEDURE — 83735 ASSAY OF MAGNESIUM: CPT

## 2025-03-02 PROCEDURE — 99232 SBSQ HOSP IP/OBS MODERATE 35: CPT | Performed by: INTERNAL MEDICINE

## 2025-03-02 PROCEDURE — 6370000000 HC RX 637 (ALT 250 FOR IP): Performed by: INTERNAL MEDICINE

## 2025-03-02 PROCEDURE — 94761 N-INVAS EAR/PLS OXIMETRY MLT: CPT

## 2025-03-02 PROCEDURE — 2700000000 HC OXYGEN THERAPY PER DAY

## 2025-03-02 PROCEDURE — 2500000003 HC RX 250 WO HCPCS: Performed by: INTERNAL MEDICINE

## 2025-03-02 PROCEDURE — 94760 N-INVAS EAR/PLS OXIMETRY 1: CPT

## 2025-03-02 PROCEDURE — 80048 BASIC METABOLIC PNL TOTAL CA: CPT

## 2025-03-02 PROCEDURE — 94640 AIRWAY INHALATION TREATMENT: CPT

## 2025-03-02 PROCEDURE — 36415 COLL VENOUS BLD VENIPUNCTURE: CPT

## 2025-03-02 PROCEDURE — 85027 COMPLETE CBC AUTOMATED: CPT

## 2025-03-02 RX ORDER — MIDODRINE HYDROCHLORIDE 10 MG/1
10 TABLET ORAL
Qty: 90 TABLET | Refills: 2 | Status: SHIPPED | OUTPATIENT
Start: 2025-03-02

## 2025-03-02 RX ORDER — TAMSULOSIN HYDROCHLORIDE 0.4 MG/1
0.4 CAPSULE ORAL DAILY
Qty: 30 CAPSULE | Refills: 2 | Status: SHIPPED | OUTPATIENT
Start: 2025-03-02

## 2025-03-02 RX ORDER — SPIRONOLACTONE 50 MG/1
50 TABLET, FILM COATED ORAL DAILY
Qty: 30 TABLET | Refills: 2 | Status: SHIPPED | OUTPATIENT
Start: 2025-03-03

## 2025-03-02 RX ORDER — METOPROLOL SUCCINATE 100 MG/1
50 TABLET, EXTENDED RELEASE ORAL DAILY
Qty: 60 TABLET | Refills: 1 | Status: SHIPPED
Start: 2025-03-02 | End: 2025-03-02

## 2025-03-02 RX ORDER — BUMETANIDE 2 MG/1
2 TABLET ORAL DAILY
Qty: 60 TABLET | Refills: 1 | Status: SHIPPED | OUTPATIENT
Start: 2025-03-02

## 2025-03-02 RX ORDER — METOPROLOL SUCCINATE 100 MG/1
50 TABLET, EXTENDED RELEASE ORAL DAILY
Qty: 60 TABLET | Refills: 1 | Status: SHIPPED
Start: 2025-03-02

## 2025-03-02 RX ADMIN — SODIUM CHLORIDE, PRESERVATIVE FREE 20 ML: 5 INJECTION INTRAVENOUS at 09:13

## 2025-03-02 RX ADMIN — METOPROLOL SUCCINATE 50 MG: 50 TABLET, EXTENDED RELEASE ORAL at 09:08

## 2025-03-02 RX ADMIN — DILTIAZEM HYDROCHLORIDE 30 MG: 30 TABLET, FILM COATED ORAL at 11:46

## 2025-03-02 RX ADMIN — SPIRONOLACTONE 100 MG: 25 TABLET ORAL at 09:08

## 2025-03-02 RX ADMIN — BUMETANIDE 2 MG: 1 TABLET ORAL at 09:08

## 2025-03-02 RX ADMIN — AMIODARONE HYDROCHLORIDE 100 MG: 200 TABLET ORAL at 09:08

## 2025-03-02 RX ADMIN — TAMSULOSIN HYDROCHLORIDE 0.4 MG: 0.4 CAPSULE ORAL at 09:08

## 2025-03-02 RX ADMIN — BUDESONIDE INHALATION 500 MCG: 0.5 SUSPENSION RESPIRATORY (INHALATION) at 07:27

## 2025-03-02 RX ADMIN — IPRATROPIUM BROMIDE AND ALBUTEROL SULFATE 1 DOSE: 2.5; .5 SOLUTION RESPIRATORY (INHALATION) at 11:55

## 2025-03-02 RX ADMIN — DILTIAZEM HYDROCHLORIDE 30 MG: 30 TABLET, FILM COATED ORAL at 09:13

## 2025-03-02 RX ADMIN — ACETAMINOPHEN 650 MG: 325 TABLET ORAL at 03:56

## 2025-03-02 RX ADMIN — ASPIRIN 81 MG: 81 TABLET, CHEWABLE ORAL at 09:08

## 2025-03-02 RX ADMIN — FERROUS SULFATE TAB 325 MG (65 MG ELEMENTAL FE) 325 MG: 325 (65 FE) TAB at 09:08

## 2025-03-02 RX ADMIN — MIDODRINE HYDROCHLORIDE 10 MG: 5 TABLET ORAL at 11:46

## 2025-03-02 RX ADMIN — EMPAGLIFLOZIN 10 MG: 10 TABLET, FILM COATED ORAL at 09:09

## 2025-03-02 RX ADMIN — DILTIAZEM HYDROCHLORIDE 30 MG: 30 TABLET, FILM COATED ORAL at 03:56

## 2025-03-02 RX ADMIN — APIXABAN 5 MG: 5 TABLET, FILM COATED ORAL at 09:08

## 2025-03-02 RX ADMIN — MIDODRINE HYDROCHLORIDE 10 MG: 5 TABLET ORAL at 09:08

## 2025-03-02 RX ADMIN — SODIUM CHLORIDE, PRESERVATIVE FREE 10 ML: 5 INJECTION INTRAVENOUS at 09:13

## 2025-03-02 RX ADMIN — IPRATROPIUM BROMIDE AND ALBUTEROL SULFATE 1 DOSE: 2.5; .5 SOLUTION RESPIRATORY (INHALATION) at 07:27

## 2025-03-02 ASSESSMENT — PAIN SCALES - GENERAL
PAINLEVEL_OUTOF10: 3
PAINLEVEL_OUTOF10: 0
PAINLEVEL_OUTOF10: 0

## 2025-03-02 ASSESSMENT — PAIN DESCRIPTION - DESCRIPTORS: DESCRIPTORS: ACHING;DISCOMFORT

## 2025-03-02 ASSESSMENT — PAIN DESCRIPTION - LOCATION: LOCATION: BACK;HEAD

## 2025-03-02 NOTE — PROGRESS NOTES
[facility] CARDIOLOGY PROGRESS NOTE           2/25/2025 11:07 AM    Admit Date: 2/17/2025         Subjective: Continues to diurese well with IV Lasix.  Yesterday had ultrasound of his abdomen to evaluate for ascites.  Still on heparin gtt.    ROS:  Cardiovascular:  As noted above    Objective:      Vitals:    02/25/25 0401 02/25/25 0500 02/25/25 0742 02/25/25 0800   BP: 108/82   (!) 118/93   Pulse:    (!) 116   Resp:  20 20    Temp:   97.7 °F (36.5 °C)    TempSrc:   Axillary    SpO2:   90%    Weight: (!) 143.2 kg (315 lb 11.2 oz)      Height:             Physical Exam:  General: Well Developed, Well Nourished, No Acute Distress, Alert & Oriented x 3, Appropriate mood  Neck: supple, no JVD  Heart: S1S2 with RRR without murmurs or gallops  Lungs: Clear throughout auscultation bilaterally without adventitious sounds  Abd: soft, nontender, nondistended, with good bowel sounds  Ext: 2+ edema bilaterally  Skin: warm and dry      Data Review:   Recent Labs     02/23/25  0401 02/24/25  0313 02/25/25  0832    143 142   K 4.4 4.4 4.8   BUN 31* 30* 36*   WBC 11.8*  --  17.3*   HGB 12.6*  --  13.3*   HCT 42.9  --  43.5     --  367       No results for input(s): \"TNIPOC\" in the last 72 hours.    Invalid input(s): \"TROIQ\"        Assessment/Plan:     Active Problems:    Pulmonary HTN (HCC)    Acute cor pulmonale (HCC)    Shortness of breath    LILA (obstructive sleep apnea)    Polycythemia    COPD (chronic obstructive pulmonary disease) (HCC)    Obesity    Acute renal failure (ARF)    Persistent atrial fibrillation (HCC)    Ascites due to alcoholic cirrhosis (HCC)    Volume overload    Pleural effusion    Renal mass    Acute on chronic respiratory failure with hypoxia and hypercapnia (HCC)    Anasarca    Morbid obesity with BMI of 40.0-44.9, adult    Acute kidney injury superimposed on CKD    COPD exacerbation (HCC)    Decompensated hepatic cirrhosis (HCC)  Resolved Problems:    * No resolved 
                     [facility] CARDIOLOGY PROGRESS NOTE           2/23/2025 9:38 AM    Admit Date: 2/17/2025      Subjective:   Ongoing BABIN and SOB, on high flow oxygen.  To floor, breathing better.   AF on tele,  on IV heparin gtt.      ROS:  Cardiovascular:  As noted above    Objective:      Vitals:    02/22/25 2354 02/23/25 0400 02/23/25 0842 02/23/25 0903   BP: 113/81 113/78 105/87    Pulse: (!) 113 (!) 109 (!) 108    Resp: 19 19 22    Temp: 97.5 °F (36.4 °C) 99.9 °F (37.7 °C) 98.2 °F (36.8 °C)    TempSrc: Axillary Oral Oral    SpO2: 93% 93% (!) 85% 92%   Weight:  (!) 142.3 kg (313 lb 11.4 oz)     Height:           Physical Exam:  General-No Acute Distress, SOB, O x 3  Neck- supple, no JVD  CV-  irreg irreg,  no MRG  Lung-coarse BS  Abd- soft, nontender, nondistended  Ext- severe LE edema bilaterally.  Skin- warm and dry    Data Review:   Recent Labs     02/20/25  1540 02/21/25  0525 02/22/25  0128 02/23/25  0401   NA  --  138 141  --    K  --  4.5 4.3  --    BUN  --  39* 34*  --    WBC  --  12.3*  --  11.8*   HGB  --  12.2*  --  12.6*   HCT  --  40.7*  --  42.9   PLT  --  416  --  314   INR 1.9  --   --   --        Assessment/Plan:     Acute Hypoxic Resp Failure:  has severe COPD, pHTN, BiV HF with EF 45%, severe TR with cor pulmonale.  Needs ongoing IV diuresis, feeling better this AM.  COPD noted to be end stage.  May need palliative care assessment.   HFrEF/Acute cor pulmonale:  continue IV diuresis, follow AM labs.    AF RVR:  h/o Afib/flutter.  Plan was for AF ablation.  For now, continue rate control and IV heparin gtt.  On PO amio.   Can change to home eliquis if no pulm procedures planned.   Hypotension: on midodrine, follow with diuresis.  On BB as well,  tough situation.  Follow.   Renal mass - known with eventual nephrectomy planned per report  CKD:  follow AM labs  Sepsis:  on abx, per ICU team.     We will follow, thank you.       Miguel Angel Mario,   2/23/2025 9:38 AM    
                     [facility] CARDIOLOGY PROGRESS NOTE           2/22/2025 11:55 AM    Admit Date: 2/17/2025      Subjective:   Ongoing BABIN and SOB, on high flow oxygen.  Sitting up in chair.  AF on tele,  on IV heparin gtt.      ROS:  Cardiovascular:  As noted above    Objective:      Vitals:    02/22/25 1033 02/22/25 1039 02/22/25 1100 02/22/25 1145   BP:  94/68 99/64    Pulse: (!) 113 (!) 109 98 (!) 103   Resp: 16 27 22   Temp:       TempSrc:       SpO2: 94% 91% (!) 89% 97%   Weight:       Height:           Physical Exam:  General-No Acute Distress, SOB, O x 3  Neck- supple, no JVD  CV-  irreg irreg,  no MRG  Lung-coarse BS  Abd- soft, nontender, nondistended  Ext- severe LE edema bilaterally.  Skin- warm and dry    Data Review:   Recent Labs     02/20/25  0526 02/20/25  1540 02/21/25  0525 02/22/25  0128     --  138 141   K 4.3  --  4.5 4.3   MG 2.2  --   --   --    BUN 48*  --  39* 34*   WBC 14.0*  --  12.3*  --    HGB 11.7*  --  12.2*  --    HCT 38.8*  --  40.7*  --      --  416  --    INR  --  1.9  --   --        Assessment/Plan:     Acute Hypoxic Resp Failure:  has severe COPD, pHTN, BiV HF with EF 45%, severe TR with cor pulmonale.  Needs ongoing IV diuresis.  COPD noted to be end stage.  May need palliative care assessment.   HFrEF/Acute cor pulmonale:  continue IV diuresis, follow AM labs.    AF RVR:  h/o Afib/flutter.  Plan was for AF ablation.  For now, continue rate control and IV heparin gtt while ill in ICU.  On PO amio.   Hypotension: on midodrine, follow with diuresis.  On BB as well,  tough situation.  Follow.   CKD:  follow AM labs  Sepsis:  on abx, per ICU team.     We will follow, thank you.       Miguel Angel Mario DO  2/22/2025 11:55 AM    
                   [facility] CARDIOLOGY PROGRESS NOTE           3/1/2025 10:44 AM    Admit Date: 2/17/2025      Subjective:   No overnight events, patient reports he feels much improved.  Weight is down 25 kg's from admission.    Review of Systems   All other systems reviewed and are negative.          Objective:      Vitals:    03/01/25 0722 03/01/25 0723 03/01/25 0828 03/01/25 0830   BP:   94/66 102/60   Pulse: 96 98     Resp:   16    Temp:   97.4 °F (36.3 °C)    TempSrc:   Tympanic    SpO2: 93% 93%     Weight:       Height:             Physical Exam  Vitals reviewed.   HENT:      Head: Normocephalic.      Right Ear: External ear normal.      Left Ear: External ear normal.      Nose: Nose normal.   Eyes:      General: No scleral icterus.  Cardiovascular:      Rate and Rhythm: Rhythm irregular.   Pulmonary:      Effort: Pulmonary effort is normal.   Abdominal:      General: There is no distension.   Musculoskeletal:      Cervical back: Neck supple.      Right lower leg: Edema present.      Left lower leg: Edema present.   Skin:     General: Skin is warm.   Neurological:      Mental Status: He is alert. Mental status is at baseline.         Data Review:   Recent Labs     02/28/25  0617 03/01/25  0436    139   K 3.7 4.2   MG  --  1.9   BUN 39* 40*   WBC 15.3* 19.7*   HGB 9.2* 9.6*   HCT 31.0* 31.4*    269       [unfilled]  Current Facility-Administered Medications   Medication Dose Route Frequency    ipratropium 0.5 mg-albuterol 2.5 mg (DUONEB) nebulizer solution 1 Dose  1 Dose Inhalation 4x Daily RT    midodrine (PROAMATINE) tablet 10 mg  10 mg Oral TID WC    apixaban (ELIQUIS) tablet 5 mg  5 mg Oral BID    dilTIAZem (CARDIZEM) immediate release tablet 30 mg  30 mg Oral 4 times per day    bumetanide (BUMEX) tablet 2 mg  2 mg Oral BID    hydrOXYzine pamoate (VISTARIL) capsule 50 mg  50 mg Oral Q6H PRN    tamsulosin (FLOMAX) capsule 0.4 mg  0.4 mg Oral Daily    spironolactone (ALDACTONE) tablet 100 
                   [facility]ARDIOLOGY PROGRESS NOTE           2/28/2025 9:37 AM    Admit Date: 2/17/2025      Subjective:       Review of Systems        Objective:      Vitals:    02/28/25 0433 02/28/25 0557 02/28/25 0801 02/28/25 0806   BP:  (!) 93/55  105/71   Pulse:  (!) 122 (!) 102    Resp:   20 22   Temp:    98.9 °F (37.2 °C)   TempSrc:       SpO2: 96% 92% 96%    Weight: 126 kg (277 lb 12.8 oz)      Height:             Physical Exam  Vitals reviewed.   HENT:      Head: Normocephalic.      Right Ear: External ear normal.      Left Ear: External ear normal.      Nose: Nose normal.   Eyes:      General: No scleral icterus.  Cardiovascular:      Rate and Rhythm: Rhythm irregular.   Pulmonary:      Effort: Pulmonary effort is normal.   Abdominal:      General: There is no distension.   Musculoskeletal:      Cervical back: Neck supple.      Right lower leg: Edema present.      Left lower leg: Edema present.   Skin:     General: Skin is warm.   Neurological:      Mental Status: He is alert. Mental status is at baseline.         Data Review:   Recent Labs     02/27/25  0440 02/28/25  0617    142   K 3.8 3.7   BUN 40* 39*   WBC 15.3* 15.3*   HGB 10.3* 9.2*   HCT 34.4* 31.0*    248       [unfilled]  Current Facility-Administered Medications   Medication Dose Route Frequency    apixaban (ELIQUIS) tablet 5 mg  5 mg Oral BID    midodrine (PROAMATINE) tablet 10 mg  10 mg Oral TID WC    dilTIAZem (CARDIZEM) immediate release tablet 30 mg  30 mg Oral 4 times per day    bumetanide (BUMEX) tablet 2 mg  2 mg Oral BID    hydrOXYzine pamoate (VISTARIL) capsule 50 mg  50 mg Oral Q6H PRN    tamsulosin (FLOMAX) capsule 0.4 mg  0.4 mg Oral Daily    spironolactone (ALDACTONE) tablet 100 mg  100 mg Oral Daily    ipratropium 0.5 mg-albuterol 2.5 mg (DUONEB) nebulizer solution 1 Dose  1 Dose Inhalation Q6H WA RT    metoprolol succinate (TOPROL XL) extended release tablet 50 mg  50 mg Oral Daily    melatonin tablet 6 
                   [facility] CARDIOLOGY PROGRESS NOTE           3/2/2025 10:46 AM    Admit Date: 2/17/2025      Subjective:   No overnight events, patient reports he feels much improved.  Weight is down 25 kg's from admission.    Review of Systems   All other systems reviewed and are negative.          Objective:      Vitals:    03/02/25 0439 03/02/25 0526 03/02/25 0730 03/02/25 0733   BP:  100/71  102/69   Pulse: (!) 106 100 (!) 104 (!) 106   Resp:  12  18   Temp:  97.9 °F (36.6 °C)  97.9 °F (36.6 °C)   TempSrc:  Oral  Temporal   SpO2: 95% 94% 95% 98%   Weight:       Height:             Physical Exam  Vitals reviewed.   HENT:      Head: Normocephalic.      Right Ear: External ear normal.      Left Ear: External ear normal.      Nose: Nose normal.   Eyes:      General: No scleral icterus.  Cardiovascular:      Rate and Rhythm: Rhythm irregular.   Pulmonary:      Effort: Pulmonary effort is normal.   Abdominal:      General: There is no distension.   Musculoskeletal:      Cervical back: Neck supple.      Right lower leg: Edema present.      Left lower leg: Edema present.   Skin:     General: Skin is warm.   Neurological:      Mental Status: He is alert. Mental status is at baseline.         Data Review:   Recent Labs     03/01/25  0436 03/02/25  0421    139   K 4.2 4.0   MG 1.9 2.0   BUN 40* 42*   WBC 19.7* 19.2*   HGB 9.6* 9.1*   HCT 31.4* 30.0*    304       [unfilled]  Current Facility-Administered Medications   Medication Dose Route Frequency    ipratropium 0.5 mg-albuterol 2.5 mg (DUONEB) nebulizer solution 1 Dose  1 Dose Inhalation 4x Daily RT    midodrine (PROAMATINE) tablet 10 mg  10 mg Oral TID WC    apixaban (ELIQUIS) tablet 5 mg  5 mg Oral BID    dilTIAZem (CARDIZEM) immediate release tablet 30 mg  30 mg Oral 4 times per day    [Held by provider] bumetanide (BUMEX) tablet 2 mg  2 mg Oral BID    hydrOXYzine pamoate (VISTARIL) capsule 50 mg  50 mg Oral Q6H PRN    tamsulosin (FLOMAX) 
               Tay Conklin  Admission Date: 2/17/2025         Daily Progress Note: 2/27/2025    Attending Attestation- Pulmonary & Critical Care    In this split/shared evaluation I performed reviewed the patients's H&P, available images, labs, cultures., discussed case in detail with NPP, performed a medically appropriate history and exam, counseled and educated the patient and/or family member, ordered and/or reviewed medications, tests or procedures, documented information in EMR, independently interpreted images, and coordinated care. which accounted for 35 minutes clinical time.     Impression: Mr. Conklin has a of severe LILA, COPD, tracheomalacia, prior smoking, a fib/flutter, pulmonary hypertension (presumed Group 2/3), renal mass, pericardial cyst and has been unable to wear BiPAP since October 2024.  He was admitted with rapid a fib and right heart failure.      VQ scan normal.  Denies knowing of any history of cirrhosis.  Stopped drinking 2 years ago.      Recommendations:  Needs nightly NIV and did not wear very long last night.  Has a rising PCO2 on ABG.  He says he stopped wearing it because it was drying out his throat so much due to poor humidification.  Favor getting him started on his home NIV asap, here in the hospital for naps and at night.  Probably has 10lbs more of diuresis required for goal weight in low 280s, but may have to scale back the pace due to  BP, renal function, intravascular depletion.   Has a slight amount of jaundice and will f/u LFTs tomorrow.  Check Hep serologies.  Portopulmonary htn would be a Group 1 type of PH for which vasodilators would be considered.  Would like to determine if there is portal hypertension.  No splenomegaly or thrombocytopenia.  Will check dopplers of portal vein to better assess.    Yuly Peterson MD  _____________________________________________________________________________________________        Background: 64 y.o. male seen and evaluated for 
       Hospitalist Progress Note   Admit Date:  2025 12:42 PM   Name:  Tay Conklin   Age:  64 y.o.  Sex:  male  :  1960   MRN:  [id number]   Room:  [facility]    Presenting/Chief Complaint: Groin Swelling, Leg Swelling, and Shortness of Breath     Reason(s) for Admission: Shortness of breath [R06.02]  Anasarca [R60.1]  Penile edema [N48.89]  Acute on chronic respiratory failure with hypoxia and hypercapnia (HCC) [J96.21, J96.22]  Hypervolemia, unspecified hypervolemia type [E87.70]  Acute hypoxemic respiratory failure (HCC) [J96.01]  Acute kidney injury superimposed on CKD [N17.9, N18.9]     Hospital Course:   Tay Conklin is a 64 y.o. male with history of CKD 3, pulmonary hypertension, A-fib/flutter, obesity/LILA on CPAP who presented on  with dyspnea and abdominal pain.  In ED, CXR shows concerns for volume overload. CTAP on admission shows pulmonary edema superimposed RLL PNA; cirrhosis with moderate/large ascites; right renal mass/renal cell carcinoma not excluded.  He was placed on BiPAP in the ED.     Patient was admitted to the ICU under intensivist service with acute on chronic respiratory failure with hypoxia and hypercapnia needed with volume overload and acute COPD exacerbation.  TTE on 2025 shows EF 45-50%; severe TVR. Cardiology was consulted for A-fib and nephrology was consulted for DONYA.  Patient was started on jet nebs as well as IV diuretics.  He was started on amiodarone for A-fib. Hospital course was complicated by patient required Precedex drip.  He was able to be weaned off of this and started on Zyprexa.    Patient [facility] outpatient.  S/p cardioversion in May 2024 for afib.  S/p ablation in 2025.  Eliquis was stopped 2025 due to gross hematuria.     He has decompensated liver cirrhosis with ascites underwent paracentesis on  with 5500 cc removed.  GI was consulted and recommended for patient to follow-up outpatient for EGD, and 
       Hospitalist Progress Note   Admit Date:  2025 12:42 PM   Name:  Tay Conklin   Age:  64 y.o.  Sex:  male  :  1960   MRN:  [id number]   Room:  [facility] [place]    Presenting/Chief Complaint: Groin Swelling, Leg Swelling, and Shortness of Breath     Reason(s) for Admission: Shortness of breath [R06.02]  Anasarca [R60.1]  Penile edema [N48.89]  Acute on chronic respiratory failure with hypoxia and hypercapnia (HCC) [J96.21, J96.22]  Hypervolemia, unspecified hypervolemia type [E87.70]  Acute hypoxemic respiratory failure (HCC) [J96.01]  Acute kidney injury superimposed on CKD [N17.9, N18.9]     Hospital Course:   Tay Conklin is a 64 y.o. male with history of CKD 3, pulmonary hypertension, A-fib/flutter, obesity/LILA on CPAP who presented on  with dyspnea and abdominal pain.  In ED, CXR shows concerns for volume overload. CTAP on admission shows pulmonary edema superimposed RLL PNA; cirrhosis with moderate/large ascites; right renal mass/renal cell carcinoma not excluded.  He was placed on BiPAP in the ED.     Patient was admitted to the ICU under intensivist service with acute on chronic respiratory failure with hypoxia and hypercapnia needed with volume overload and acute COPD exacerbation.  TTE on 2025 shows EF 45-50%; severe TVR. Cardiology was consulted for A-fib and nephrology was consulted for DONYA.  Patient was started on jet nebs as well as IV diuretics.  He was started on amiodarone for A-fib. Hospital course was complicated by patient required Precedex drip.  He was able to be weaned off of this and started on Zyprexa.    Patient follows [facility] outpatient.  S/p cardioversion in May 2024 for afib.  S/p ablation in 2025.  Eliquis was stopped 2025 due to gross hematuria.     He has decompensated liver cirrhosis with ascites underwent paracentesis on  with 5500 cc removed.  GI was consulted and recommended for patient to follow-up outpatient for EGD, and 
    [facility] Progress Note    Follow-Up on: DONYA/CKD    ROS:  Gen - no fever, no chills, appetite unchanged  CV - no chest pain, no palpitation  Lung - shortness of breath better, no cough  Abd - no tenderness, no nausea/vomiting, no diarrhea  Ext - + edema    Exam:  Vitals:    02/19/25 0615 02/19/25 0630 02/19/25 0723 02/19/25 0812   BP: (!) 87/61 93/62  (!) 85/61   Pulse: 86 79 82    Resp: 27 15 23    Temp:       TempSrc:       SpO2: 97% 94% 94%    Weight:       Height:             Intake/Output Summary (Last 24 hours) at 2/19/2025 0850  Last data filed at 2/19/2025 0624  Gross per 24 hour   Intake 429.02 ml   Output 1450 ml   Net -1020.98 ml       Wt Readings from Last 3 Encounters:   02/18/25 (!) 150.1 kg (331 lb)   01/08/25 (!) 149.6 kg (329 lb 14.4 oz)   12/17/24 (!) 147 kg (324 lb)       GEN - in no distress  CV - regular, no murmur, no rub  Lung - basilar rales bilaterally  Abd - soft, nontender  Ext - 1-2+ edema    Recent Labs     02/17/25  1304 02/17/25  1557 02/19/25  0348 02/19/25  0449   WBC 16.0* 15.4* 13.4*  --    HGB 14.7 13.4* 12.4*  --    HCT 47.6 45.1 42.2  --    * 540* 406  --    INR  --  2.0  --  1.8        Recent Labs     02/17/25  1304 02/18/25  0605 02/19/25  0348   * 136 139   K 4.8 4.7 4.7   CL 91* 94* 97*   CO2 31* 34* 35*   BUN 55* 53* 54*   CREATININE 2.27* 2.07* 2.08*   CALCIUM 9.1 8.7* 8.3*   MG 2.6* 2.5* 2.4   PHOS  --  5.9* 5.0*   ALBUMIN 3.5 3.1* 2.6*       Assessment / Plan:  Active Problems:    Pulmonary HTN (HCC)    Acute cor pulmonale (HCC)    Shortness of breath    LILA (obstructive sleep apnea)    Polycythemia    COPD (chronic obstructive pulmonary disease) (HCC)    Obesity    Acute renal failure (ARF)    Persistent atrial fibrillation (HCC)    Ascites    Volume overload    Pleural effusion    Renal mass    Acute on chronic respiratory failure with hypoxia and hypercapnia (HCC)    Anasarca    Morbid obesity with BMI of 40.0-44.9, adult    Acute kidney 
   02/17/25 1941   NIV Type   Equipment Type V60   Mode Bilevel   Mask Type Full face mask   Mask Size Medium   Assessment   Pulse (!) 113   Respirations 24   SpO2 100 %   Comfort Level Fair   Using Accessory Muscles No   Mask Compliance Good   Skin Assessment Clean, dry, & intact   Settings/Measurements   PIP Observed 21 cm H20   IPAP 22 cmH20   CPAP/EPAP 10 cmH2O   Vt (Measured) 425 mL   Rate Ordered 24   Insp Rise Time (%) 4 %   FiO2  100 %   I Time/ I Time % 0.9 s   Minute Volume (L/min) 10.2 Liters   Mask Leak (lpm) 0 lpm   Patient's Home Machine No   Alarm Settings   Alarms On Y   Low Pressure (cmH2O) 5 cmH2O   High Pressure (cmH2O) 35 cmH2O   Apnea (secs) 20 secs   RR Low (bpm) 8   RR High (bpm) 56 br/min       
   02/18/25 2211   NIV Type   NIV Started/Stopped On   Equipment Type V60   Mode Bilevel   Mask Type Full face mask   Mask Size Medium   Assessment   Pulse 88   Respirations 28   SpO2 90 %   Comfort Level Good   Using Accessory Muscles No   Mask Compliance Good   Skin Assessment Clean, dry, & intact   Settings/Measurements   PIP Observed 22 cm H20   IPAP 22 cmH20   CPAP/EPAP 10 cmH2O   Vt (Measured) 693 mL   Rate Ordered 24   Insp Rise Time (%) 4 %   FiO2  100 %   I Time/ I Time % 0.9 s   Minute Volume (L/min) 19.3 Liters   Mask Leak (lpm) 0 lpm   Patient's Home Machine No   Alarm Settings   Alarms On Y   Low Pressure (cmH2O) 5 cmH2O   High Pressure (cmH2O) 35 cmH2O   Apnea (secs) 20 secs   RR Low (bpm) 8   RR High (bpm) 56 br/min       
   02/21/25 2054   NIV Type   $NIV $Daily Charge   NIV Started/Stopped On   Equipment Type v60   Mode Bilevel   Assessment   Respirations 27   SpO2 95 %   Comfort Level Good   Using Accessory Muscles No   Mask Compliance Good   Skin Assessment Clean, dry, & intact   Settings/Measurements   PIP Observed 21 cm H20   IPAP 22 cmH20   CPAP/EPAP 10 cmH2O   Vt (Measured) 653 mL   Rate Ordered 24   Insp Rise Time (%) 4 %   FiO2  50 %  (NIV)   I Time/ I Time % 0.9 s   Minute Volume (L/min) 17.8 Liters   Mask Leak (lpm) 0 lpm   Patient's Home Machine No   Alarm Settings   Alarms On Y   Low Pressure (cmH2O) 5 cmH2O   High Pressure (cmH2O) 35 cmH2O   Apnea (secs) 20 secs   RR Low (bpm) 8   RR High (bpm) 40 br/min       
   02/24/25 2326   NIV Type   NIV Started/Stopped On   Equipment Type V60   Mode Bilevel   Mask Type Full face mask   Mask Size Medium   Assessment   Respirations 28   Comfort Level Good   Using Accessory Muscles No   Mask Compliance Good   Settings/Measurements   PIP Observed 21 cm H20   IPAP 22 cmH20   CPAP/EPAP 10 cmH2O   Vt (Measured) 602 mL   Rate Ordered 24   Insp Rise Time (%) 4 %   FiO2  50 %   I Time/ I Time % 0.9 s   Minute Volume (L/min) 16.9 Liters   Mask Leak (lpm) 10 lpm   Patient's Home Machine No   Alarm Settings   Alarms On Y   Low Pressure (cmH2O) 5 cmH2O   High Pressure (cmH2O) 35 cmH2O   Apnea (secs) 20 secs   RR Low (bpm) 8   RR High (bpm) 40 br/min       
   02/27/25 0636   Treatment   Is patient on O2? Y   Oxygen Therapy/Pulse Ox   O2 Device Heated high flow cannula   O2 Flow Rate (L/min) 45 L/min   FiO2  60 %   Pulse (!) 110   SpO2 95 %   Blood Gas  Performed? Yes   $ABG $Arterial Puncture   Bradley's Test #1 Pos   Site #1 Right Radial   Site Prepped #1 Yes   Number of Attempts #1 1   Pressure Held #1 Yes   Complications #1 None   Post-procedure #1 Standard   Specimen Status #1 Point of care   How Tolerated? Tolerated well   Treatment Team Notification   Reason for Communication Critical results   Type of Critical Result POC test   Critical Lab Information pCO2 92.7, HCO3 55   Person Result Received From Lelo PARISI RCP   Critical POC Result Type Arterial blood gas   Name of Team Member Notified    Treatment Team Role Consulting Provider   Method of Communication Secure Message   Response Other (Comment)  (placed pt on BiPap)   Notification Time 6646       
   [facility] CARDIOLOGY PROGRESS NOTE    2/19/2025 7:34 AM    Admit Date: 2/17/2025        Subjective:   Somnolent on Precedex and BiPAP.  Marked abdominal distention.  In A-fib in the 80s during my evaluation.  Review of systems unobtainable    Objective:      Vitals:    02/19/25 0545 02/19/25 0600 02/19/25 0615 02/19/25 0630   BP: (!) 77/61 (!) 86/59 (!) 87/61 93/62   Pulse: 87 80 86 79   Resp: (!) 43 30 27 15   Temp:       TempSrc:       SpO2: 95% 97% 97% 94%   Weight:       Height:           Physical Exam:  Neck- supple, difficult to assess JVD  CV-irregularly irregular with 3/6 TR murmur and possible friction rub  Lung- clear bilaterally anteriorly, mildly coarse and decreased lateral bases  Abd- soft, nontender, severely distended  Ext-trace to 1+ edema  Skin- warm and dry    Data Review:   Pertinent lab and noninvasive imaging over the past 24 hours reviewed and evaluated.    Recent Labs     02/17/25  1557 02/18/25  0605 02/19/25  0348 02/19/25  0449   NA  --  136 139  --    K  --  4.7 4.7  --    MG  --  2.5* 2.4  --    BUN  --  53* 54*  --    WBC 15.4*  --  13.4*  --    HGB 13.4*  --  12.4*  --    HCT 45.1  --  42.2  --    *  --  406  --    INR 2.0  --   --  1.8     No results found for: \"LDL\", \"LDLDIRECT\"    Echocardiogram 2/18/2025:  Left Ventricle Mildly reduced left ventricular systolic function with a visually estimated EF of 45 - 50%. Left ventricle is moderately dilated. Mildly increased wall thickness. Findings consistent with mild concentric hypertrophy. Septal flattening in diastole and systole consistent with right ventricular volume and pressure overload. Mild global hypokinesis present. Indeterminate diastolic function due to atrial fibrillation. Average E/e' ratio is 5.51.   Left Atrium Left atrium is moderately dilated.   Right Ventricle Right ventricle is moderately dilated. Moderate hypertrophy noted. Moderately reduced systolic function.   Right Atrium Right atrium is severely dilated. 
  2/26/2025 4:43 PM    Admit Date: 2/17/2025    Admit Diagnosis: Shortness of breath [R06.02]  Anasarca [R60.1]  Penile edema [N48.89]  Acute on chronic respiratory failure with hypoxia and hypercapnia (HCC) [J96.21, J96.22]  Hypervolemia, unspecified hypervolemia type [E87.70]  Acute hypoxemic respiratory failure (HCC) [J96.01]  Acute kidney injury superimposed on CKD [N17.9, N18.9]      Subjective:   NO cp- breathinf better      Objective:    /76   Pulse (!) 111   Temp 98.3 °F (36.8 °C) (Oral)   Resp 18   Ht 1.88 m (6' 2\")   Wt (!) 136.9 kg (301 lb 12.8 oz)   SpO2 93%   BMI 38.75 kg/m²     Physical Exam:  General-Well Developed, Well Nourished, No Acute Distress, Alert & Oriented x 3, appropriate mood.  Neck- supple, no JVD  CV- irregular rate and rhythm no MRG  Lung- clear bilaterally  Abd- soft, nontender, nondistended  Ext- one plusedema bilaterally.  Skin- warm and dry        Data Review:   Recent Labs     02/26/25  0630      K 4.6   BUN 36*   WBC 17.5*   HGB 12.9*   HCT 42.8          Assessment/Plan:     Active Hospital Problems    Shortness of breath      Pulmonary HTN (HCC)      Acute cor pulmonale (HCC)      Decompensated hepatic cirrhosis (HCC)      COPD exacerbation (HCC)      Ascites due to alcoholic cirrhosis (HCC)      Volume overload secondary to right heart failure- Continue aldactone      Pleural effusion      Renal mass      Acute on chronic respiratory failure with hypoxia and hypercapnia (HCC)      Anasarca      Morbid obesity with BMI of 40.0-44.9, adult      Acute kidney injury superimposed on CKD      Persistent atrial fibrillation (HCC)- tolerating toprol- stop midodrine      LILA (obstructive sleep apnea)      Polycythemia      Obesity      COPD (chronic obstructive pulmonary disease) (HCC)      Acute renal failure (ARF)        
  2/27/2025 3:28 PM    Admit Date: 2/17/2025    Admit Diagnosis: Shortness of breath [R06.02]  Anasarca [R60.1]  Penile edema [N48.89]  Acute on chronic respiratory failure with hypoxia and hypercapnia (HCC) [J96.21, J96.22]  Hypervolemia, unspecified hypervolemia type [E87.70]  Acute hypoxemic respiratory failure (HCC) [J96.01]  Acute kidney injury superimposed on CKD [N17.9, N18.9]      Subjective:   No cp or sob      Objective:    /73   Pulse (!) 105   Temp 98.1 °F (36.7 °C) (Oral)   Resp 20   Ht 1.88 m (6' 2\")   Wt 132 kg (291 lb)   SpO2 91%   BMI 37.36 kg/m²     Physical Exam:  General-Well Developed, Well Nourished, No Acute Distress, Alert & Oriented x 3, appropriate mood.  Neck- supple, no JVD  CV- irregular rate and rhythm no MRG  Lung- clear bilaterally  Abd- soft, nontender, nondistended  Ext- no edema bilaterally.  Skin- warm and dry        Data Review:   Recent Labs     02/27/25  0440      K 3.8   BUN 40*   WBC 15.3*   HGB 10.3*   HCT 34.4*          Assessment/Plan:     Active Hospital Problems    Shortness of breath      Pulmonary HTN (HCC)      Acute cor pulmonale (HCC)Improved with current therapy. Will continue medications         Heart failure with mid-range ejection fraction (HCC)      Acute right-sided heart failure (HCC)      Decompensated hepatic cirrhosis (HCC)      COPD exacerbation (HCC)      Ascites due to alcoholic cirrhosis (HCC)      Volume overload      Pleural effusion      Renal mass      Acute on chronic respiratory failure with hypoxia and hypercapnia (HCC)      Anasarca      Morbid obesity with BMI of 40.0-44.9, adult      Acute kidney injury superimposed on CKD      Persistent atrial fibrillation (HCC)      LILA (obstructive sleep apnea)      Polycythemia      Obesity      COPD (chronic obstructive pulmonary disease) (HCC)      Acute renal failure (ARF)    AF- hr better with the addition of cardizem today- restart eliquis when ok with primary team    
  Harshad Quinones/[facility] Critical Care Note:: 2/23/2025  Tay Conklin  Admission Date: 2/17/2025     Length of Stay: 6 days    Background:  Patient is a 64 y.o.  male seen and evaluated at the request of Dr. Garcia for acute respiratory failure requiring BiPAP. PMH of severe COPD, smoking history (cessation 2014), PHTN, A fibrillation/flutter,  LILA needing CPAP, renal mass and polycythemia.  He has been followed by [facility] and has been on lasix at home. He was seen at [facility] (Dr Starks for partial vs radical). He was seen by us in December 2024.     His current medications include Ventolin, Breo, and a nebulizer for albuterol. He is no longer taking Spiriva. His oxygen levels are stable, and he uses both a home concentrator and a portable oxygen device. In May 2024, the patient underwent cardioversion following a transesophageal echocardiogram (NADINE). In January 2025, he was seen by Dr Vasquez for an ablation and was to continue eliquis. In February 2025, he developed gross hematuria and CT from October 2024 showed indeterminate 5.8 cm masslike density at the upper pole of the right kidney. CT renal mass protocol 10/23/2024 showed continued presence of the right upper pole renal mass with enhancement with suspicion for renal cell carcinoma. He is scheduled to have an ablation on Friday (2/21) and will have surgery in April for renal mass.      He lives alone. He presented here with Sob and was placed on BiPAP.  He is grossly volume overloaded. He does not use home BiPAP machine (because he says he needs a new machine with humidity). He reports having abdominal pain at the umbilical hernia site, which is also red and painful on palpation. CXR shows volume overload and right effusion. Echocardiogram EF 50-55%, severely dilated right atrium, RVSP 50-60 mm hg, severe tricuspid regurgitation. He continues to work. He is accompanied by his ex-wife and daughter. We were asked to admit him for acute 
  Harshad Quinones/[facility] Critical Care Note:: 2/22/2025  Tay Conklin  Admission Date: 2/17/2025     Length of Stay: 5 days    Background:  Patient is a 64 y.o.  male seen and evaluated at the request of Dr. Garcia for acute respiratory failure requiring BiPAP. PMH of severe COPD, smoking history (cessation 2014), PHTN, A fibrillation/flutter,  LILA needing CPAP, renal mass and polycythemia.  He has been followed by [facility] and has been on lasix at home. He was seen at [facility] (Dr Starks for partial vs radical). He was seen by us in December 2024.     His current medications include Ventolin, Breo, and a nebulizer for albuterol. He is no longer taking Spiriva. His oxygen levels are stable, and he uses both a home concentrator and a portable oxygen device. In May 2024, the patient underwent cardioversion following a transesophageal echocardiogram (NADINE). In January 2025, he was seen by Dr Vasquez for an ablation and was to continue eliquis. In February 2025, he developed gross hematuria and CT from October 2024 showed indeterminate 5.8 cm masslike density at the upper pole of the right kidney. CT renal mass protocol 10/23/2024 showed continued presence of the right upper pole renal mass with enhancement with suspicion for renal cell carcinoma. He is scheduled to have an ablation on Friday (2/21) and will have surgery in April for renal mass.      He lives alone. He presented here with Sob and was placed on BiPAP.  He is grossly volume overloaded. He does not use home BiPAP machine (because he says he needs a new machine with humidity). He reports having abdominal pain at the umbilical hernia site, which is also red and painful on palpation. CXR shows volume overload and right effusion. Echocardiogram EF 50-55%, severely dilated right atrium, RVSP 50-60 mm hg, severe tricuspid regurgitation. He continues to work. He is accompanied by his ex-wife and daughter. We were asked to admit him for acute 
  Physician Progress Note      PATIENT:               GABRIELA FINK  CSN #:                  [id number]  :                       1960  ADMIT DATE:       2025 12:42 PM  DISCH DATE:  RESPONDING  PROVIDER #:        Thien Belcher MD          QUERY TEXT:    Patient admitted with Acute on chronic respiratory failure with hypoxia and   hypercapnia , noted to have atrial fibrillation and is maintained on Eliquis.   If possible, please document in progress notes and discharge summary if you   are evaluating and/or treating any of the following:?  ?  The medical record reflects the following:  Risk Factors: Patient is an 64-year-old male with hypertension and CHF,   history of Atrial fibrillation  Clinical Indicators: maintained on Eliquis  Treatment: Eliquis management, bleeding precautions  Thank you. Kortney Shook RN, Clinical Documentation Integrity, [employer], [facility], [facility]  Options provided:  -- Secondary hypercoagulable state in a patient with atrial fibrillation  -- Other - I will add my own diagnosis  -- Disagree - Not applicable / Not valid  -- Disagree - Clinically unable to determine / Unknown  -- Refer to Clinical Documentation Reviewer    PROVIDER RESPONSE TEXT:    This patient has secondary hypercoagulable state in a patient with atrial   fibrillation.    Query created by: Kortney Shook on 2025 9:17 AM      Electronically signed by:  Thien Belcher MD 2025 10:46 AM          
  Physician Progress Note      PATIENT:               GABRIELA FINK  CSN #:                  [id number]  :                       1960  ADMIT DATE:       2025 12:42 PM  DISCH DATE:  RESPONDING  PROVIDER #:        Galo Dolan MD          QUERY TEXT:    Pt admitted with Acute on chronic respiratory failure with hypoxia and   hypercapnia and has CHF documented. If possible, please document in progress   notes and discharge summary further specificity regarding the type and acuity   of CHF:    The medical record reflects the following:  Risk Factors: Heart failure reduced EF/acute cor pulmonale: Cardiology is   following.  Agree with diuresis for now. New echo is worse.  Clinical Indicators: 2-17 ECHO EF 45%, 2-17 BNP 1943, Bumex drip  Treatment: Cardiology is following.  Agree with diuresis for now. New echo is   worse.    Thank you.  Kortney Shook RN, Clinical Documentation Integrity, [place], [facility], [facility]  Options provided:  -- Acute on Chronic Systolic CHF/HFrEF  -- Acute Systolic CHF/HFrEF  -- Chronic Systolic CHF/HFrEF  -- Other - I will add my own diagnosis  -- Disagree - Not applicable / Not valid  -- Disagree - Clinically unable to determine / Unknown  -- Refer to Clinical Documentation Reviewer    PROVIDER RESPONSE TEXT:    This patient is in acute on chronic systolic CHF/HFrEF.    Query created by: Kortney Shook on 2025 1:54 PM      Electronically signed by:  Galo Dolan MD 2025 7:52 AM          
 attempted to visit patient.  Patient appeared to be sleeping comfortably at time.  also attended IDR to assess for spiritual care needs.  will continue to follow in ICU and attempt to assess further.    Peace be with you,  Signed by  KAYLAH HardinDiv.   [phone]  
 offered spiritual care visit with patient. Patient declined a visit at this time.  
4 Eyes Skin Assessment     NAME:  Tay Conklin  YOB: 1960  MEDICAL RECORD NUMBER:  [id number]    The patient is being assessed for  Transfer to New Unit    I agree that at least one RN has performed a thorough Head to Toe Skin Assessment on the patient. ALL assessment sites listed below have been assessed.      Areas assessed by both nurses:    Head, Face, Ears, Shoulders, Back, Chest, Arms, Elbows, Hands, Sacrum. Buttock, Coccyx, Ischium, and Legs. Feet and Heels        Does the Patient have a Wound? Yes wound(s) were present on assessment. LDA wound assessment was Initiated and completed by RN    AL skin tears.  Blanchable redness sacrum       Jeremy Prevention initiated by RN: Yes  Wound Care Orders initiated by RN: Yes    Pressure Injury (Stage 3,4, Unstageable, DTI, NWPT, and Complex wounds) if present, place Wound referral order by RN under : No    New Ostomies, if present place, Ostomy referral order under : No     Nurse 1 eSignature: Electronically signed by PUNEET EDOUARD RN on 2/22/25 at 4:40 PM EST    **SHARE this note so that the co-signing nurse can place an eSignature**    Nurse 2 eSignature: Electronically signed by DANYELLE LIZ RN on 2/22/25 at 4:41 PM EST   
ACUTE PHYSICAL THERAPY GOALS:   (Developed with and agreed upon by patient and/or caregiver.)    (1.) Tay Conklin  will move from supine to sit and sit to supine  with MODIFIED INDEPENDENCE within 7 treatment day(s).    (2.) Tay Conklin will transfer from bed to chair and chair to bed with MODIFIED INDEPENDENCE using the least restrictive device within 7 treatment day(s).    (3.) Tay Conklin will ambulate with MODIFIED INDEPENDENCE for 150 feet with the least restrictive device within 7 treatment day(s).   (4.) Tay Conklin will perform standing static and dynamic balance activities x 10 minutes with SUPERVISION to improve safety within 7 treatment day(s).  (5.) Tay Conklin will perform therapeutic exercises x 20 min for HEP with INDEPENDENCE to improve strength, endurance, and functional mobility within 7 treatment day(s).        PHYSICAL THERAPY: Daily Note AM   (Link to Caseload Tracking: PT Visit Days : 3  Time In/Out PT Charge Capture  Rehab Caseload Tracker  Orders    Tay Conklin is a 64 y.o. male   PRIMARY DIAGNOSIS: <principal problem not specified>  Shortness of breath [R06.02]  Anasarca [R60.1]  Penile edema [N48.89]  Acute on chronic respiratory failure with hypoxia and hypercapnia (HCC) [J96.21, J96.22]  Hypervolemia, unspecified hypervolemia type [E87.70]  Acute hypoxemic respiratory failure (HCC) [J96.01]  Acute kidney injury superimposed on CKD [N17.9, N18.9]       Inpatient: Payor: [facility] / Plan: [employer] - CHOICE PLUS / Product Type: *No Product type* /     ASSESSMENT:     REHAB RECOMMENDATIONS:   Recommendation to date pending progress:  Setting:  Home Health Therapy    Equipment:    To Be Determined     ASSESSMENT:  Mr. Conklin was received supine in bed, agreeable to therapy. He was able to come to sitting at EOB with modified independence. He was able to participate in seated and standing balance activities, multiple sit>stand transfers, marching in 
ACUTE PHYSICAL THERAPY GOALS:   (Developed with and agreed upon by patient and/or caregiver.)    (1.) Tay Conklin  will move from supine to sit and sit to supine  with MODIFIED INDEPENDENCE within 7 treatment day(s).    (2.) Tay Conklin will transfer from bed to chair and chair to bed with MODIFIED INDEPENDENCE using the least restrictive device within 7 treatment day(s).    (3.) Tay Conklin will ambulate with MODIFIED INDEPENDENCE for 150 feet with the least restrictive device within 7 treatment day(s).   (4.) Tay Conklin will perform standing static and dynamic balance activities x 10 minutes with SUPERVISION to improve safety within 7 treatment day(s).  (5.) Tay Conklin will perform therapeutic exercises x 20 min for HEP with INDEPENDENCE to improve strength, endurance, and functional mobility within 7 treatment day(s).        PHYSICAL THERAPY: Daily Note AM   (Link to Caseload Tracking: PT Visit Days : 4  Time In/Out PT Charge Capture  Rehab Caseload Tracker  Orders    Tay Conklin is a 64 y.o. male   PRIMARY DIAGNOSIS: Acute right-sided heart failure (HCC)  Shortness of breath [R06.02]  Anasarca [R60.1]  Penile edema [N48.89]  Acute on chronic respiratory failure with hypoxia and hypercapnia (HCC) [J96.21, J96.22]  Hypervolemia, unspecified hypervolemia type [E87.70]  Acute hypoxemic respiratory failure (HCC) [J96.01]  Acute kidney injury superimposed on CKD [N17.9, N18.9]       Inpatient: Payor: [facility] / Plan: [employer] - CHOICE PLUS / Product Type: *No Product type* /     ASSESSMENT:     REHAB RECOMMENDATIONS:   Recommendation to date pending progress:  Setting:  Home Health Therapy    Equipment:    To Be Determined     ASSESSMENT:  Mr. Conklin is supine upon contact and drowsy, but agreeable to treatment.  He performed all mobility this session with mod I/SBA and minimal cueing for safety awareness and line management.  The patient ambulated in the room without an 
AM abg did not cross over, the following abg is on airo 45L 60%:    pH  7.382  CO2  92.7  pO2  70.5  cHCO3 55  BE  29.9  SO2  91.8%  
Called to pt room. Pt stating 77%-80% on 10L/min. Bumped pt up to 12L/min without any change. RT notified and came to pt room; pt bumped up to 14L/min and stating @ 98%. Pt asymptomatic.   
Discharge instructions reviewed with patient.  PT voiced understanding of all discharge instructions. IV's removed and heart monitor returned to central monitoring.   Prescriptions given for Bumex, midodrine, spirolactone, flomax and med info sheets provided for all new medications in AVS. Opportunity for questions provided.   Patient is ready for discharge. Family to transport patient home around 1300 per pt..   
Hold heparin gtt at 0100 2/27 6 hours prior to paracentesis per Liv ORTIZ.   
In chart for placement  
Nutrition Assessment  Assessment Type: Reassess  Reason for visit:  Best Practice Alert: Malnutrition Screening Tool   Malnutrition Screening Tool Score: 2    Nutrition Intervention:   Food and/or Nutrient Delivery:   Meals and Snacks:  Diet: Continue current order  Medical Food Supplements:   Medical food supplement therapy:  None Not applicable      Malnutrition Assessment:  Academy/A.S.P.E.N Clinical Malnutrition Criteria  Malnutrition Status: At risk for malnutrition (poor appetite x2 months, decr po x2 mo)    Nutrition Focused Physical Exam: Unremarkable     Nutrition Assessment:  Food/Nutrition Related History:   Patient reports that his appetite has been poor the last 2 months. As a result endorses eating 25% less than normal as a result. He eats 2 good meals/day, and has a light lunch. He follows a mediterranean diet at baseline. Denies having issues chewing/swallowing. Denies n/v/c/d prior to admit.      Do You Have Any Cultural, Spiritism, or Ethnic Food Preferences?: No     Weight History:   EMR weight history review from cardiology office visits: 333# 3/12/24, 341# 5/20/24, 326# 6/7/24, 319# 7/5/24, 281# 11/11/24, 329# 1/8/25. Patient reports weight gain at home prior to admit. Mostly fluid related.     Nutrition Background:       PMH significant for severe COPD, pulmonary HTN, LILA, Afib, renal mass, polycythemia, EtOH use.  He presents with SOB, and is admitted with respiratory failure, anasarca, and pulmonary hypertension.   2/17: CT A/P - 1. Umbilical hernia No bowel involvement. Mild pulmonary edema with superimposed right lower lobe pneumonia. Tiny right pleural effusion. Cirrhosis with moderate/large ascites. Body wall edema/anasarca.  No bowel obstruction or ileus. Colonic diverticulosis without diverticulitis.  2/18: paracentesis with 5500 mL removed   Nutrition Monitoring/Evaluation:  Spoke with SHAWN Vasquez. Reports patient eating well, 100% of all meals. Pt confirms. Denies N/V/C/D at this time. 
Nutrition Assessment  Assessment Type: Reassess  Reason for visit:  Best Practice Alert: Malnutrition Screening Tool   Malnutrition Screening Tool Score: 2    Nutrition Intervention:   Food and/or Nutrient Delivery:   Meals and Snacks:  Diet: Continue current order  Medical Food Supplements:   Medical food supplement therapy:  None Not applicable      Malnutrition Assessment:  Academy/A.S.P.E.N Clinical Malnutrition Criteria  Malnutrition Status: At risk for malnutrition (poor appetite x2 months, decr po x2 mo)  Nutrition Focused Physical Exam: Unremarkable     Nutrition Assessment:  Food/Nutrition Related History:   Patient reports that his appetite has been poor the last 2 months. As a result endorses eating 25% less than normal as a result. He eats 2 good meals/day, and has a light lunch. He follows a mediterranean diet at baseline. Denies having issues chewing/swallowing. Denies n/v/c/d prior to admit.      Do You Have Any Cultural, Buddhist, or Ethnic Food Preferences?: No     Weight History:   EMR weight history review from cardiology office visits: 333# 3/12/24, 341# 5/20/24, 326# 6/7/24, 319# 7/5/24, 281# 11/11/24, 329# 1/8/25. Patient reports weight gain at home prior to admit. Mostly fluid related.     Nutrition Background:       PMH significant for severe COPD, pulmonary HTN, LILA, Afib, renal mass, polycythemia, EtOH use.  He presents with SOB, and is admitted with respiratory failure, anasarca, and pulmonary hypertension.   2/17: CT A/P - 1. Umbilical hernia No bowel involvement. Mild pulmonary edema with superimposed right lower lobe pneumonia. Tiny right pleural effusion. Cirrhosis with moderate/large ascites. Body wall edema/anasarca.  No bowel obstruction or ileus. Colonic diverticulosis without diverticulitis.  2/18: paracentesis with 5500 mL removed     Nutrition Monitoring/Evaluation:  Patient is reclined in bed on airvo, no family present during my visit this afternoon. States that his appetite 
Occupational Therapy Note:    Attempted to see pt for OT treatment session--pt adamantly refused despite max encouragement and education on importance from therapist. Requested therapist not attempt to see him again later today. Will continue to follow and attempt to see as schedule allows.    Thank you,  Yesenia Tolbert, OTR/L   
Occupational Therapy Note:    Attempted to see pt for OT treatment session--pt refused this AM due to having to \"move around\" earlier this AM when he went to ultrasound. Will continue to follow and attempt to see as schedule allows.    Thank you,  Yesenia Tolbert, OTR/L   
Patient asking for AirVo settings to be decreased.  Patient sat 90%.  Patient educated on o2 goals and not being able to decrease AirVo at this time.  Patient continues to pull Airvo Cannula out of nose despite education  
Patient assessed to have sat's in the 80s, labored breathing with wheezing and rhonchi.  Patient currently on airvo.  Respiratory called and currently with another patient.  This RN adjusted AirVo settings until respiratory at bedside.  Patient originally assessed at 50L 50%.  This RN adjusted to 50L 90%.  Respiratory at bedside and agreeable to changes.  Patient assessed after interventions of increased o2, breathing treatments and scheduled IV Lasix.  Diminished lung sounds but unlabored breathing.  Sat's 95%  
Patient awaiting ultrasound results for possible pending paracentesis.  Ultrasound completed 2/24/25 at 2241.  Perfect serve for radiology not available.  House supervisor contacted  
Patient has desat episodes when moving from chair to bed interchangeably when on HFNC (high flow) NC. Please call RCP(Respiratory) before moving patient so that HHFNC (heated high flow) can be placed on patient to avoid oxygen decompensation.     03/01/25 1755   Oxygen Therapy/Pulse Ox   O2 Therapy Oxygen humidified   O2 Device High flow nasal cannula   O2 Flow Rate (L/min) (S)  5 L/min  (RN increased to 11L after moving from chair to bed)   Pulse (!) 111   SpO2 (!) (S)  82 %       
Patient placed on V60 and connected to Hoh Monitoring including Continuous Pulse Oximetry. V60 plugged into central monitoring system. Alarms are activated and nurse has been notified. Documentation completed.      02/26/25 5887   NIV Type   Suction Setup and Functional Yes   NIV Started/Stopped On   Equipment Type V60   Mode Bilevel   Mask Type Under the nose   Mask Size Large   Assessment   Respirations 26   Comfort Level Good   Using Accessory Muscles No   Mask Compliance Good   Breath Sounds   Breath Sounds Bilateral Diminished   Settings/Measurements   PIP Observed 17 cm H20   IPAP 20 cmH20  (lowered for compliance)   CPAP/EPAP 8 cmH2O   Vt (Measured) 476 mL   Rate Ordered 18   Insp Rise Time (%) 4 %   FiO2  50 %   I Time/ I Time % 0.9 s   Minute Volume (L/min) 12.4 Liters   Mask Leak (lpm) 9 lpm   Patient's Home Machine No   Alarm Settings   Alarms On Y   Low Pressure (cmH2O) 5 cmH2O   High Pressure (cmH2O) 35 cmH2O   Apnea (secs) 20 secs   RR Low (bpm) 10   RR High (bpm) 40 br/min       
Patient was placed on BiPAP machine by RT. Patient took himself off after 20 minutes and refusing to go back on machine, despite education.   
Patient's heparin gtt on hold since 0100 2/27 prior to paracentesis. Per primary MD Belcher, do not restart heparin gtt when patient returns to unit from paracentesis.     Per MD Belcher \"wait until discharge likely\" to restart anti coag.   
Physical Therapy Attempt Note    Pt refusing PT services per OT, stating \"I'm not getting up today after having my abdomen opened up.\"  Will attempt again as able.     Andrade Hayden, PT    
Physical Therapy Note:    Attempted to see patient this PM for physical therapy treatment  session. Patient refused mobility, stating that he has already been performing transfers in the room. I explained the importance of participation with PT/OT to improve overall activity tolerance, but pt continued to adamantly refuse. Will follow and re-attempt as schedule permits/patient available. Thank you,    Renny Carr, PT     Rehab Caseload Tracker   
Placed on Bipap for fluid overload patient on a full face mask doing well but not very happy about the bipap. Will continue to monitor  
Placed pt on post ABG. Patient placed on V60 and connected to Continuous Pulse Oximetry. V60 plugged into central monitoring system. Alarms are activated and nurse has been notified. Documentation completed.    02/27/25 0637   NIV Type   $NIV $Daily Charge   NIV Started/Stopped On   Equipment Type v60   Mode Bilevel   Mask Type Under the nose   Assessment   Pulse (!) 116   Respirations 22   SpO2 95 %   Level of Consciousness 1   Comfort Level Good   Using Accessory Muscles No   Mask Compliance Good   Breath Sounds   Respiratory Pattern Tachypneic   Settings/Measurements   PIP Observed 18 cm H20   IPAP 18 cmH20   CPAP/EPAP 8 cmH2O   Vt (Measured) 493 mL   Rate Ordered 18   Insp Rise Time (%) 3 %   FiO2  50 %   I Time/ I Time % 33 s   Minute Volume (L/min) 10.8 Liters   Mask Leak (lpm) 5 lpm   Patient's Home Machine No   Electrical Safety Check Performed Yes   Alarm Settings   Alarms On Y   Low Pressure (cmH2O) 5 cmH2O   High Pressure (cmH2O) 35 cmH2O   Apnea (secs) 20 secs   RR Low (bpm) 10   RR High (bpm) 40 br/min       
Pt assisted from chair to bed. After transferring pt, oxygen saturation dropped into the 80s. Pt titrated to 15 L high flow NC. RT arrived at bedside and placed pt on airvo. After pt placed on airvo, O2 sats improved (sustained in the 90s). Pt in bed resting comfortably, continuous pulse oximeter intact.   
Pt requested to remove bipap after 2 hours of wearing. Education provided. Pt still refused. Placed back on airvo.   
Pt requesting break from BIPAP for a snack and to make phone calls. Ok per Dr. Gaffney to trial airvo for 1 hour and provide small snack if pt able to tolerate with good oxygenation and without increase in WOB. Resume BIPAP afterward.  
Pt requesting to be taken off of Bipap.  Pt states that he is awake for the morning and would like to wear his Airvo.  Pt placed back on airvo.  No other needs at this time  
TRANSFER - IN REPORT:    Verbal report received from Yannick ESCOBAR on Tay Conklin  being received from IR  for routine post-op      Report consisted of patient's Situation, Background, Assessment and   Recommendations(SBAR).     Information from the following report(s) Nurse Handoff Report, Index, Surgery Report, Recent Results, and Event Log was reviewed with the receiving nurse.    Opportunity for questions and clarification was provided.      Assessment completed upon patient's arrival to unit and care assumed.     5500 REMOVED   
TRANSFER - OUT REPORT:    Verbal report given to SHAWN Beal on Tay Conklin  being transferred to 4th floor for routine post-op       Report consisted of patient's Situation, Background, Assessment and   Recommendations(SBAR).     Information from the following report(s) Surgery Report and MAR was reviewed with the receiving nurse.           Lines:   Peripheral IV Left;Posterior Hand (Active)   Site Assessment Clean, dry & intact 02/27/25 0836   Line Status Capped;Normal saline locked;Flushed 02/27/25 0836   Line Care Cap changed 02/27/25 0836   Phlebitis Assessment No symptoms 02/27/25 0836   Infiltration Assessment 0 02/27/25 0836   Alcohol Cap Used Yes 02/27/25 0836   Dressing Status Clean, dry & intact 02/27/25 0836   Dressing Type Transparent 02/27/25 0836   Dressing Intervention New 02/21/25 1751       Peripheral IV 02/22/25 Right;Posterior Hand (Active)   Site Assessment Clean, dry & intact 02/27/25 0836   Line Status Capped;Normal saline locked 02/27/25 0836   Line Care Cap changed 02/27/25 0836   Phlebitis Assessment No symptoms 02/27/25 0836   Infiltration Assessment 0 02/27/25 0836   Alcohol Cap Used Yes 02/27/25 0836   Dressing Status Clean, dry & intact 02/27/25 0836   Dressing Type Transparent 02/27/25 0836   Dressing Intervention New 02/22/25 1000       Peripheral IV 02/26/25 Left Antecubital (Active)   Site Assessment Clean, dry & intact 02/27/25 0836   Line Status Capped;Normal saline locked;Flushed 02/27/25 0836   Line Care Cap changed 02/27/25 0836   Phlebitis Assessment No symptoms 02/27/25 0836   Infiltration Assessment 0 02/27/25 0836   Alcohol Cap Used Yes 02/27/25 0836   Dressing Status Clean, dry & intact 02/27/25 0836   Dressing Type Transparent 02/27/25 0836        Opportunity for questions and clarification was provided.      Patient transported with:  O2 @ 10 lpm       
TRANSFER - OUT REPORT:    Verbal report given to SHAWN Dubon on Tay Conklin  being transferred to [facility] for routine progression of patient care       Report consisted of patient's Situation, Background, Assessment and   Recommendations(SBAR).     Information from the following report(s) Nurse Handoff Report, Intake/Output, MAR, Recent Results, Cardiac Rhythm Afib, and Alarm Parameters was reviewed with the receiving nurse.           Lines:   Peripheral IV Left;Posterior Hand (Active)   Site Assessment Clean, dry & intact 02/22/25 1130   Line Status Capped 02/22/25 1130   Line Care Connections checked and tightened 02/22/25 1130   Phlebitis Assessment No symptoms 02/22/25 1130   Infiltration Assessment 0 02/22/25 1130   Alcohol Cap Used Yes 02/22/25 1130   Dressing Status Clean, dry & intact 02/22/25 1130   Dressing Type Transparent 02/22/25 1130   Dressing Intervention New 02/21/25 1751       Peripheral IV 02/22/25 Right;Posterior Hand (Active)   Site Assessment Clean, dry & intact 02/22/25 1130   Line Status Flushed;Infusing 02/22/25 1130   Line Care Connections checked and tightened 02/22/25 1130   Phlebitis Assessment No symptoms 02/22/25 1130   Infiltration Assessment 0 02/22/25 1130   Alcohol Cap Used Yes 02/22/25 1130   Dressing Status Clean, dry & intact 02/22/25 1130   Dressing Type Transparent 02/22/25 1130   Dressing Intervention New 02/22/25 1000        Opportunity for questions and clarification was provided.      Patient transported with:  RN, O2, pt belongings        
Tay Conklin is 64 y.o. y/o male     64-year-old male past medical history of alcohol use disorder complicated by decompensated cirrhosis with ascites. Last paracentesis was couple days ago, diuretics held today due to soft blood pressure readings. I recommended to start 5 mg 3 times daily midodrine to help with the blood pressure to see if we can restart the diuretics. Midodrine can be uptitrated to 10 mg 3 times daily. His abdomen is slightly getting distended, I expect in the next couple days he would need another paracentesis.     Today: BP improved on midodrine. Pt started on lasix 40 mg BID. He feels that his abdomen is more distended but otherwise denies any complaints. Possible cardiac ablation today.    Labs: ammonia 48 wnl, Cr 1.55 today (1.8 yesterday), Hgb 12, Na 138 wnl, Potassium 4.5 wnl      PE:   Vitals:    02/21/25 0930   BP: 115/73   Pulse: (!) 140   Resp: 24   Temp:    SpO2: 93%      General:  The patient appears well-nourished, and is in no acute distress.    HEENT:  Normocephalic, atraumatic. No sclerae icterus.   Abdomen:  Soft, non tender to palpation. Distended.  Neurologic:  Alert and oriented x3.  Psychiatric: Appropriate mood and affect.    Assessment and Plan:   #Decompensated cirrhosis with ascites  -- BP improved on midodrine. Cr improving. Na/K stable. Pt started on lasix 40 mg BID. Abdomen still distended. Consider paracentesis in next few days. If renal function allows, consider restarting aldactone. MD to follow.     Electronically signed by Laurie Sesay PA-C.    
Tay Conklin is 64 y.o. y/o male     Patient admitted for SOB/abdominal distention/anasarca. Seen by GI for management of new cirrhosis (likely ETOH given patient history of daily ETOH usage, though nothing in a week). Had paracentesis with 5500 cc removed on 2/18; negative SBP and cultures with NGTD. Patient with some confusion/agitation issues as well. Also here for pulmonary HTN, DONYA from cardiorenal causes, R kidney mass, CHF (EF 45-50%). Seen by GI yesterday; recommending outpatient follow up for cirrhosis management.     Today: GI reconsulted for further recommendations for cirrhosis. He was on aldactone and Bumex; this was held due to hypotension this AM with SBP of 80s. Creatinine is improving. Also potential plans for a repeat paracentesis if his blood pressure improves. Had more agitation/confusion yesterday, but was alert/oriented when I spoke with him. Seems that his agitation gets worse at night.     Discussed with intensivist; feels like his mentation, abdominal distention has improved. Planning to keep him on aldactone/Bumex once BP is better and paracentesis if needed.     He confirms this is a new diagnosis of cirrhosis; no prior hx of FLD. Was drinking ETOH daily, but nothing in a year. No GI follow up; had an EGD 3/2024 for UGIB showing small bowel ulcers and gastritis and colonoscopy showing diverticulosis.     Notes that his abdominal distention began back in October and worsening; he was on Lasix 40 mg BID at home for CHF. Has not had a bowel movement in 4x days.    Labs: WBC 14, Hgb 11.7, Plt 403, Cr 1.8 (2.08), BUN 48 (54), K+ 4.3, Na+ 136, TB/LFT WNL. Negative hepatitis panel on admission.    MELD 3.0: 19 at 2/20/2025  5:26 AM  MELD-Na: 20 at 2/20/2025  5:26 AM  Calculated from:  Serum Creatinine: 1.80 MG/DL at 2/20/2025  5:26 AM  Serum Sodium: 136 mmol/L at 2/20/2025  5:26 AM  Total Bilirubin: 0.7 MG/DL (Using min of 1 MG/DL) at 2/20/2025  5:26 AM  Serum Albumin: 2.6 g/dL at 2/20/2025  
Total ascitic fluid removed is 4,700 ml. No specimens required for the lab.  
Unable to get Arteria Gas Venous run instead results as follows 7.30/78.3/36/38.7/12.3 uploaded to Epic  
  history of Atrial fibrillation  Clinical Indicators: maintained on Eliquis  Treatment: Eliquis management, bleeding precautions  Thank you. Kortney Shook RN, Clinical Documentation Integrity, [employer], [facility], CRCR  Options provided:  -- Secondary hypercoagulable state in a patient with atrial fibrillation  -- Other - I will add my own diagnosis  -- Disagree - Not applicable / Not valid  -- Disagree - Clinically unable to determine / Unknown  -- Refer to Clinical Documentation Reviewer    PROVIDER RESPONSE TEXT:    Provider disagreed with this query.  not sure what you are talking about.    Query created by: Kortney Shook on 2/19/2025 1:56 PM      Electronically signed by:  Jayden Fernandez MD 2/20/2025 9:02 AM          
40.0-44.9, adult    Acute kidney injury superimposed on CKD  Resolved Problems:    * No resolved hospital problems. *    1) DONYA/ CKD stage 3A  - Last known Cr about 1.1 in 10/24 (Care Everywhere - [facility])  - He denies NSAIDS, no overt hypotension at home  - Admission Cr 2.27  - Suspect acute cardiorenal syndrome   - Cr better today, diuretics being held due to hypotension     2) Volume overload     3) Renal mass - known, followed by Dr. Starks with eventual nephrectomy planned     4) Cor pulmonale     5) HFrEF    6) Hypotension     High Medical Decision Making  -Patient with at least one acute illness that poses a threat to bodily function  -Reviewed 3 labs  -Reviewed external charts   
CKD    COPD exacerbation (HCC)  Resolved Problems:    * No resolved hospital problems. *    1) DONYA/ CKD stage 3A  - Last known Cr about 1.1 in 10/24 (Care Everywhere - [facility])  - He denies NSAIDS, no overt hypotension at home  - Admission Cr 2.27  - Suspect acute cardiorenal syndrome   - Cr with ongoing improvement, diuretics resumed, good UOP  - Will monitor to ensure stability of renal function     2) Volume overload     3) Renal mass - known, followed by Dr. Starks with eventual nephrectomy planned     4) Cor pulmonale     5) HFrEF    6) Hypotension - BP meds on hold, on midodrine     High Medical Decision Making  -Patient with at least one acute illness that poses a threat to bodily function  -Reviewed 3 labs  -Reviewed external charts   
Continue IV diuresis daily BMPs continue to improve.  2) Hypotension - getting midodrine as well as BB, will attempt to ween midodrine today to 2.5 mg TID.  3) Afib - continue hep gtt, and amiodarone 100 mg daily.  Consider transition to oral antiocoag once okay with primary team.  3) sCHF with systolic dysfunction - on jardiance and long acting BB.  Will titrate HF meds as bp will tolerated    Ye Mcgregor MD  2/24/2025 8:53 AM    
hypoxia and hypercapnia (HCC)    Anasarca    Morbid obesity with BMI of 40.0-44.9, adult    Acute kidney injury superimposed on CKD  Resolved Problems:    * No resolved hospital problems. *    1) DONYA/ CKD stage 3A  - Last known Cr about 1.1 in 10/24 (Care Everywhere - [facility])  - He denies NSAIDS, no overt hypotension at home  - Admission Cr 2.27  - Suspect acute cardiorenal syndrome   - Cr better today, diuretics being held due to hypotension.  BP appears better, I am okay with resuming diuretics     2) Volume overload     3) Renal mass - known, followed by Dr. Starks with eventual nephrectomy planned     4) Cor pulmonale     5) HFrEF    6) Hypotension - BP meds on hold, on midodrine     High Medical Decision Making  -Patient with at least one acute illness that poses a threat to bodily function  -Reviewed 3 labs  -Reviewed external charts   
injury superimposed on CKD    COPD exacerbation (HCC)  Resolved Problems:    * No resolved hospital problems. *    1) DONYA/ CKD stage 3A  - Last known Cr about 1.1 in 10/24 (Care Everywhere - [facility])  - He denies NSAIDS, no overt hypotension at home  - Admission Cr 2.27  - Suspect acute cardiorenal syndrome   - Cr with ongoing improvement down to 1.4 today with diuresis    2) Volume overload- starting bumex gtt today with ongoing monitoring of his I/O.     3) Renal mass - known, followed by Dr. Starks with eventual nephrectomy planned     4) Cor pulmonale     5) HFrEF    6) Hypotension - BP meds on hold, on midodrine     High Medical Decision Making  -Patient with at least one acute illness that poses a threat to bodily function  -Reviewed 3 labs  -Reviewed external charts   
injury superimposed on CKD    COPD exacerbation (HCC)  Resolved Problems:    * No resolved hospital problems. *    1) DONYA/ CKD stage 3A  - Last known Cr about 1.1 in 10/24 (Care Everywhere - [facility])  - He denies NSAIDS, no overt hypotension at home  - Admission Cr 2.27  - Suspect acute cardiorenal syndrome   - Cr with ongoing improvement, diuretics resumed, good UOP  - Will monitor to ensure stability of renal function, no labs today, will order now     2) Volume overload     3) Renal mass - known, followed by Dr. Starks with eventual nephrectomy planned     4) Cor pulmonale     5) HFrEF    6) Hypotension - BP meds on hold, on midodrine     High Medical Decision Making  -Patient with at least one acute illness that poses a threat to bodily function  -Reviewed 3 labs  -Reviewed external charts   
obesity with BMI of 40.0-44.9, adult    Acute kidney injury superimposed on CKD    COPD exacerbation (HCC)    Decompensated hepatic cirrhosis (HCC)  Resolved Problems:    * No resolved hospital problems. *    1) DONYA/ CKD stage 3A  - Last known Cr about 1.1 in 10/24 (Care Everywhere - [facility])  - He denies NSAIDS, no overt hypotension at home  - Admission Cr 2.27  - Suspect acute cardiorenal syndrome   - Cr with ongoing improvement down to 1.5 today with diuresis    2) Volume overload- increase bumex gtt to 1mg/hr today with ongoing monitoring of his I/O.     3) Renal mass - known, followed by Dr. Starks with eventual nephrectomy planned     4) Cor pulmonale     5) HFrEF    6) Hypotension - BP meds on hold, on midodrine      With stable Cr and excellent response to bumex gtt.  We will sign off.  Please call with questions.     High Medical Decision Making  -Patient with at least one acute illness that poses a threat to bodily function  -Reviewed 3 labs  -Reviewed external charts   
problems. *    1) Acute Cor Pulmonale - continue IV lasix and oral spironolactone for diuresis and daily BMPs.  2) Afib - restarted metoprolol today for rate control, BP is limiting therapy. Continue heparin for stroke prevention  3) Hypotension - on midodrine for BP support    Ye Mcgregor MD  2/21/2025 12:39 PM    
right pleural effusion.  3. Cirrhosis with moderate/large ascites. Body wall edema/anasarca.  4. Upper right renal pole mass measuring 5.5 cm, renal cell carcinoma is not excluded. No comparison imaging. Follow-up CT renal mass protocol is recommended.  5. No bowel obstruction or ileus. Colonic diverticulosis without diverticulitis.    Nutrition Monitoring/Evaluation:  Pt is reclined in bed on bipap, no family present during my visit this AM. Provides history as above. States he's NPO for a procedure today, and is asking for a menu b/c he wants to know what's for dinner tonight. Denies n/v/c/d currently. Declines an ONS once diet is advanced as he wants to see how he eats first which I think is reasonable. Will continue to follow.       Current Nutrition Therapies:  Diet NPO    Current Intake:   Average Meal Intake: NPO Average Supplements Intake: NPO      Anthropometric Measures:  Height: 188 cm (6' 2.02\")  Current Body Wt: 150.5 kg (331 lb 12.7 oz) (2/18), Weight source: Bed scale  BMI: 42.6, Obese Class 3 (BMI 40.0 or greater)  Admission Body Weight: 149.2 kg (329 lb) (2/17 stated)  Ideal Body Weight (Kg) (Calculated): 86 kg (190 lbs), 174.6 %  BMI Category Obese Class 3 (BMI 40.0 or greater)    Comparative Standards:  Energy (kcal/day): 8398-6368 (20-25 kcals/kg) (Kcal/kg Weight used: 86 kg Ideal  Protein (g/day): 69-86 (0.8-1 g/kg) Weight Used: (Ideal) 86 kg  Fluid (ml/day):   (1 ml/kcal)    Nutrition Diagnosis:   Inadequate oral intake related to decreased appetite as evidenced by  (intake 25% less than usual x2 months)    Nutrition Goal(s):      Active Goal: Initiation of nutrition, by next RD assessment       Discharge Planning:    Too soon to determine    Jennifer Thorne RD    
surveillance of cirrhosis for HCC screening.  He has intermittent hypotension with diuretics and required midodrine TID.  He was started on broad-spectrum antibiotic on admission for cellulitis around umbilicus and lower abdomen region due to anasarca.  He completed a course of antibiotics while inpatient with blood cultures NGTD.     Patient was able to come off of BiPAP and transitioned to Airvo.  He was transferred out of ICU on 2/22 and hospitalist was asked to assume care in 2/24.      Subjective & 24hr Events:   Still on airvo.  Thinks he feels better today.  Did not wear bipap last night, got agitated.  Still overloaded.  No CP, fevers, or other complaints.      Intake/Output Summary (Last 24 hours) at 2/27/2025 1001  Last data filed at 2/27/2025 0400  Gross per 24 hour   Intake 2097.05 ml   Output 2900 ml   Net -802.95 ml     Assessment & Plan:     Acute on chronic respiratory failure with hypoxia and hypercapnia   Pulmonary HTN  Acute cor pulmonale   Volume overload  Pleural effusion  CHF with mid range EF 45%  Still needs diuretics.  Grossly overloaded with ascites as well.  Resume bumex but PO; had good response  Midodrine stopped by cardiology but may need to resume if BP cannot tolerate diuresis. Commonly used in cirrhotics  Continue Jardiance, Toprol  O2 Flow Rate (L/min): 45 L/min.  Wean oxygen as able.  Daily BMP on diuresis    Decompensated liver cirrhosis with ascites  Repeat abd US showing prominent ascites.  IR consulted to repeat paracentesis  Resume bumex  Cont aldactone  recommend midodrine for BP support     Persistent atrial fibrillation    Defer to Cardiology  Cardizem started  Continue amiodarone  Cont toprol  Continue heparin gtt    LILA (obstructive sleep apnea)  Obesity  Pulmonary arranging NIV nightly for home  Discussed with pulmonary, may need medication to help tolerate NIV at night.    THOMAS  Hb lower today without overt bleeding.  Monitor.  Continue ferrous sulfate     COPD with 
Mod Max Total  NT x2 Comments:   Level of Assistance [] [] [] [x] [x] [] [] [] [] [] []    Distance   8 ft, marching in place    DME None    Gait Quality Decreased kory  and Decreased step length    Weightbearing Status      Stairs      I=Independent, Mod I=Modified Independent, S=Supervision, SBA=Standby Assistance, CGA=Contact Guard Assistance,   Min=Minimal Assistance, Mod=Moderate Assistance, Max=Maximal Assistance, Total=Total Assistance, NT=Not Tested    PLAN:   FREQUENCY AND DURATION: 3 times/week for duration of hospital stay or until stated goals are met, whichever comes first.    TREATMENT:   TREATMENT:   Co-Treatment PT/OT necessary due to patient's decreased overall endurance/tolerance levels, as well as need for high level skilled assistance to complete functional transfers/mobility and functional tasks  Therapeutic Activity (25 Minutes): Therapeutic activity included Rolling, Supine to Sit, Sit to Supine, Scooting, Ambulation on level ground, Sitting balance , and Standing balance to improve functional Activity tolerance, Balance, Mobility, and Strength.    TREATMENT GRID:  N/A    AFTER TREATMENT PRECAUTIONS: Bed, Bed/Chair Locked, Call light within reach, Needs within reach, RN notified, and Visitors at bedside    INTERDISCIPLINARY COLLABORATION:  RN/ PCT and OT/ SMITH    EDUCATION:      TIME IN/OUT:  Time In: 1441  Time Out: 1506  Minutes: 25    OLYA HAMMOND PT    
Shaving with electric razor, brushing teeth, washing face   Personal Device Care [] [] [] [] [] [] [] [] [] [x]    Functional Mobility [] [] [x] [x] [] [] [] [] [] [] No AD   I=Independent, Mod I=Modified Independent, S=Supervision/Setup, SBA=Standby Assistance, CGA=Contact Guard Assistance, Min=Minimal Assistance, Mod=Moderate Assistance, Max=Maximal Assistance, Total=Total Assistance, NT=Not Tested    BALANCE: Good Fair+ Fair Fair- Poor NT Comments   Sitting Static [x] [] [] [] [] []    Sitting Dynamic [] [x] [] [] [] []              Standing Static [] [x] [] [] [] []    Standing Dynamic [] [x] [] [] [] []        PLAN:     FREQUENCY/DURATION   OT Plan of Care: 3 times/week for duration of hospital stay or until stated goals are met, whichever comes first.    TREATMENT:     TREATMENT:   Co-Treatment between OT and PT necessary due to patient's decreased overall endurance/tolerance levels, as well as need for high level skilled assistance to complete functional transfers/mobility and functional tasks  Self Care (24 minutes): Patient participated in grooming, functional mobility, bed mobility, functional transfer, bathroom mobility, and energy conservation in standing with minimal verbal cueing to increase independence, decrease assistance required, and increase activity tolerance. The patient was educated on compensatory technique during ADL  and energy conservation techniques during ADL/IADLS and patient demonstrated understanding and will need reinforcement at next session.     TREATMENT GRID:  N/A    AFTER TREATMENT PRECAUTIONS: Bed/Chair Locked, Call light within reach, Chair, Needs within reach, and RN notified    INTERDISCIPLINARY COLLABORATION:  RN/ PCT and OT/ SMITH    EDUCATION:       TOTAL TREATMENT DURATION AND TIME:  Time In: 1131  Time Out: 1155  Minutes: 24    NABEEL Claire            
contributor to overall poor outcome    Acute renal failure (ARF) (HCC)  Plan: Multifactorial    Persistent atrial fibrillation (HCC)  Plan: Rate controlled and anticoagulated    Ascites  Plan: Planning paracentesis today.    Volume overload  Plan: Patient is very preload dependent.  Most of patient's volume is third spaced.  Likely intravascularly volume depleted.  RV failure patients do not tolerate large volume diuresis quickly secondary to development of hypotension and worsening renal failure.  Agree with slow ongoing diuresis.    Pleural effusion  Plan: Assess for thoracentesis    Renal mass  Plan: Eventually will need nephrectomy    Acute on chronic respiratory failure with hypoxia and hypercapnia  Plan: Managed per primary team    Anasarca  Plan: Most of patient's issues are obesity.  He has large volume ascites but does not have significant peripheral edema.    Morbid obesity with BMI of 40.0-44.9, adult  Plan: Major contributor to multiple medical issues    Acute kidney injury superimposed on CKD (HCC)  Plan: Nephrology is following        ARIADNA NOBLES MD  2/18/2025 10:49 AM    
release capsule 120 mg  120 mg Oral Daily    empagliflozin (JARDIANCE) tablet 10 mg  10 mg Oral Daily    aspirin chewable tablet 81 mg  81 mg Oral Daily    amiodarone (CORDARONE) tablet 100 mg  100 mg Oral Daily    spironolactone (ALDACTONE) tablet 25 mg  25 mg Oral Daily    heparin (porcine) injection 4,000 Units  4,000 Units IntraVENous PRN    heparin (porcine) injection 2,000 Units  2,000 Units IntraVENous PRN    heparin 25,000 units in dextrose 5% 250 mL (premix) infusion  5-30 Units/kg/hr IntraVENous Continuous       Data Review: data included in this note has been independently reviewed by the author     TELEMETRY: A-fib    Assessment/Plan:     Patient Active Problem List   Diagnosis    LILA (obstructive sleep apnea)    Polycythemia    COPD (chronic obstructive pulmonary disease) (HCC)    History of prior cigarette smoking    Obesity    Acute renal failure (ARF)    Noncompliance with medication regimen    Gastritis    Diverticulosis    Chronic respiratory failure with hypercapnia (HCC)    Pulmonary HTN (HCC)    Acute cor pulmonale (HCC)    Atrial flutter (HCC)    Anemia    Persistent atrial fibrillation (HCC)    Ascites    Volume overload    Pleural effusion    Renal mass    Acute on chronic respiratory failure with hypoxia and hypercapnia (HCC)    Anasarca    Morbid obesity with BMI of 40.0-44.9, adult    Acute kidney injury superimposed on CKD    Shortness of breath     PLAN  Shortness of breath-multifactorial, see below, currently somnolent on Precedex and BiPAP.  Continue diuresis as tolerated.       Pulmonary HTN (HCC)-significant pulmonary hypertension with septal flattening throughout the cardiac cycle consistent with RV pressure and volume overload.  Bilateral effusions and significant ascites.  Volume removal per critical care.  Hold diuresis as needed, hypotensive overnight.  Poor prognosis.       Acute cor pulmonale (HCC)-as above       Ascites-significant abdominal distention, paracentesis as 
and need for assistance, verbal cues, tactile cues, visual cues, and education to improve sitting balance, standing balance, proprioception, posture, coordination, static balance, and dynamic balance in order to prepare for functional task, prepare for functional transfer, increase safety awareness, and prepare for self care..   Self Care (13 minutes): Patient participated in grooming, functional mobility, bed mobility, functional transfer, and energy conservation in unsupported sitting and standing with minimal visual, verbal, and tactile cueing to increase independence, decrease assistance required, increase activity tolerance, and increase safety awareness. The patient was educated on role of occupational therapy, strategies to improve safety, and transfer training and safety and patient verbalized understanding.     TREATMENT GRID:  N/A    AFTER TREATMENT PRECAUTIONS: Bed, Bed/Chair Locked, Call light within reach, Needs within reach, RN notified, and Visitors at bedside    INTERDISCIPLINARY COLLABORATION:  RN/ PCT, PT/ PTA, and OT/ SMITH    EDUCATION:       TOTAL TREATMENT DURATION AND TIME:  Time In: 1441  Time Out: 1507  Minutes: 26    CRYSTAL COCHRAN, OT            
transfer, and energy conservation in unsupported sitting and standing with minimal verbal cueing to increase independence, decrease assistance required, and increase activity tolerance. The patient was educated on compensatory technique during ADL  and energy conservation techniques during ADL/IADLS and patient will need reinforcement at next session.     TREATMENT GRID:  N/A    AFTER TREATMENT PRECAUTIONS: Bed/Chair Locked, Call light within reach, Chair, Needs within reach, and RN notified    INTERDISCIPLINARY COLLABORATION:  RN/ PCT and OT/ SMITH    EDUCATION:       TOTAL TREATMENT DURATION AND TIME:  Time In: 1115  Time Out: 1200  Minutes: 45    Gita Martinez OT            
dilated.    Atrial fibrillation with rapid ventricular response noted during the study.  -Findings are suggestive of cor pulmonale    Signed by: Mikel Barr MD on 2/18/2025  8:03 AM    Assessment and Plan:  (Medical Decision Making)   Impression: 64-year-old male with a history of severe COPD, smoking history (cessation 2014), PHTN, A fibrillation/flutter, LILA needing CPAP, renal mass and polycythemia. He has been followed by [facility] and has been on lasix at home. He was seen at [facility] (Dr Starks for partial vs radical). He was seen by us in December 2024. Here with gross volume overload, ascites and acute respiratory failure. Status post paracentesis on 2/18 with a 5.5 L removed. Some agitation issues overnight past 2 nights.     Active Problems:    Pulmonary HTN (HCC)  Plan: multifactoral.  Acute volume overload, afib, uncontrolled LILA/OHS, COPD, DONYA on CKD.  Mostly likely combination group 2 and 3, but if can control lila and volume, could advance work up if echo still abnormal.        Acute on chronic respiratory failure with hypoxia and hypercapnia (HCC)    LILA (obstructive sleep apnea)    Morbid obesity with BMI of 40.0-44.9, adult  Plan: OHS with elevated CO2, HCO3, severe COPD along with PHTN, heart failure.    --continue zyprexa at night to help with agitation, sleep.  --needs PAP and with chronic hypercapnea, obesity, COPD and heart failure and has failed home bipap, will start process for NIV.  Info sent to Dial[facility] Pedroza and patient approved.    Mr. Conklin has acute on chronic hypercapnic respiratory secondary to his severe COPD, OHS and heart failure.  His last PFT on 4/9/24 showed a FEV1 of only 48% predicted.  He remains on Bipap S/T of 22/10 with a rate of 20 and despite this, his pCO2 on this remains elevated at 61.7.   Due to his severe chronic respiratory failure and multiple comorbidities, he requires requires non invasive ventilation for life sustaining measures and to 
fibrillation. Average E/e' ratio is 5.51.    Right Ventricle: Right ventricle is moderately dilated. Moderate hypertrophy noted. Moderately reduced systolic function.    Mitral Valve: Mild regurgitation.    Tricuspid Valve: Severe regurgitation. RVSP may be underestimated in the setting of severe TR. The estimated RVSP is 41 mmHg.    Left Atrium: Left atrium is moderately dilated.    Extracardiac: Small right pleural effusion.    IVC/SVC: IVC diameter is greater than 21 mm and decreases less than 50% during inspiration; therefore the estimated right atrial pressure is elevated (~15 mmHg). IVC is dilated.    Atrial fibrillation with rapid ventricular response noted during the study.  -Findings are suggestive of cor pulmonale    Signed by: Mikel Barr MD on 2/18/2025  8:03 AM    Assessment and Plan:  (Medical Decision Making)   Impression: 64-year-old male with a history of severe COPD, smoking history (cessation 2014), PHTN (presumed group 2/3),  tracheomalacia, A-fibrillation/flutter, severe LILA needing CPAP (reportedly unable to wear BiPAP since October 2024), right renal mass, prior smoker, and polycythemia.   He has been followed by [facility] and has been on lasix at home. He was seen at [facility] (Dr Starks for partial vs radical). He was seen by us in December 2024. Admitted with a-fib/flutter and right heart failure with gross volume overload, ascites, and acute respiratory failure.         Pulmonary HTN (HCC)  Plan:   -Multifactorial: Acute volume overload, afib, uncontrolled LILA/OHS, COPD, DONYA on CKD.  Mostly likely combination group 2/3, but if can control LILA and volume, could advance work up if echo still abnormal.  -VQ normal.     Acute on chronic respiratory failure with hypoxia and hypercapnia (HCC)   Volume overload   Pleural effusion  Plan:   -Bumex PO.  -Blood pressure borderline low, but renal function improved.  -Net negative ~26 L since admission.  -On 4 L O2 at rest and 6-8 L 
reach, Chair, Needs within reach, and RN notified    INTERDISCIPLINARY COLLABORATION:  RN/ PCT and OT/ SMITH    EDUCATION: Education Given To: Patient  Education Provided: Plan of Care;Role of Therapy;Fall Prevention Strategies;Energy Conservation  Education Method: Verbal  Barriers to Learning: None  Education Outcome: Verbalized understanding  Educated patient and/or family/caregiver on the following: Assistive device indications/utilization/safety    TIME IN/OUT:  Time In: 1407  Time Out: 1450  Minutes: 43    OLYA HAMMOND, PT    
and 6-8 L O2 w/ activity at home.      LILA (obstructive sleep apnea)    Morbid obesity with BMI of 40.0-44.9, adult  Plan: OHS with elevated CO2, HCO3, severe COPD along with PHTN, heart failure.    -Obesity complicates all aspects of care  -Zyprexa BID restarted    -Has NIV from home & reports he did much better last night      Decompensated liver cirrhosis with ascites  Plan:  -Repeat abd US showing prominent ascites.  -Paracentesis 2/18 w/ 5.5 L removed  -Repeat paracentesis 2/27 w/ 4.7 cc fluid removed  -Bumex PO  -Aldactone  -Midodrine recommended by primary for BP support  -LFTs normal/stable  -As per primary      Polycythemia  Plan: related to poorly controlled sleep apnea, nocturnal hypoxia.        COPD (chronic obstructive pulmonary disease) (HCC)  Plan:   -Duoneb & budesonide  -Not currently in exacerbation      Persistent atrial fibrillation (HCC)  Plan:   -On metoprolol XL daily.  Diltiazem currently held.        Renal mass    Ascites    Acute kidney injury superimposed on CKD  Plan:   -Fluid from abd negative for malignancy.   -Bumex PO-ascites & lower extremity edema improved from admission  -Nephrology signed off.  Renal function improved.      More than 50% of the time documented was spent in face-to-face contact with the patient and in the care of the patient on the floor/unit where the patient is located.    
by: Adan Figueredo,  on 5/24/2024  1:35 PM      Spirometry 4/2024  Component  Ref Range & Units 10 mo ago   FEV 1 , POC  L 1.81   FEV1 % Pred POC  % 48   FVC, POC 3.50 L   FVC % Pred POC  % 70   FEV1/FVC, POC 67%              Impression    1.  Spirometry, lung volumes and flow-volume loops demonstrate a severe obstruction.  2.  No bronchodilator response by ATS criteria.  3.  Severe reduction in diffusion capacity.    Impression: Severe obstruction without bronchodilator response and severe reduction in diffusion capacity consistent with COPD.    Jose Carlos Boyer MD          MICRO:   Recent Labs     02/17/25  1457 02/17/25  1557 02/18/25  1216   CULTURE NO GROWTH 2 DAYS NO GROWTH 2 DAYS NO GROWTH 1 DAY     No results for input(s): \"COVID19\" in the last 72 hours.      Assessment and Plan:  (Medical Decision Making)   Impression: The patient is a 64-year-old male with a history of severe COPD, smoking history (cessation 2014), PHTN, A fibrillation/flutter,  LILA needing CPAP, renal mass and polycythemia.  He has been followed by [facility] and has been on lasix at home. He was seen at [facility] (Dr Starks for partial vs radical). He was seen by us in December 2024. Here with gross volume overload, ascites and acute respiratory failure.  Status post paracentesis on 2/18 with a 5.5 L removed.  Some agitation issues overnight          NEURO:   Sedation: Did get some Precedex now off.  More calm and alert at this time.  Analgesia: As needed     CV:   Anasarca: Grossly volume overloaded at this time.  On Bumex drip and currently -1.5 L finally.  Did have ascites status post paracentesis on 2/18 and 5.5 L removed.  Continue to keep negative balance   Pulmonary hypertension: Severe pulmonary hypertension.  Will need further workup.  Heart failure reduced EF/acute cor pulmonale: Cardiology is following.  Help appreciated.  Agree with diuresis for now. New echo is worse. See above.   Atrial fibrillation/flutter-patient 
diuresis. Still with edema. Spoke with renal and will need bumex drip. Taper oxygen as tolerated.Continue nebs. Spoke with patient and his family. Remaining tx per PMD.            Jayden Fernandez MD    
member, ordered and/or reviewed medications, tests or procedures, documented information in EMR, independently interpreted images, and coordinated care. which accounted for 19 minutes clinical time.     Impression:     65 y/o male with copd exacerbation and volume overload. Has had some issues with agitation but better now- on zyprexa..  Up in chair on airvo 56% fio2. Getting lasix -1114 last 24 hrs. May be able to move to floor soon    Slim Munoz Jr, MD     
minutes clinical time.     Impression: still on airvo at 45 L and 60%.  Diuresing very well.  About 3 L output.  Arms and abdomen are slightly less.  Still has marked edema of the lower extremities.  Renal is following.  Continue Bumex drip for now.  He did have ultrasound last night.  No report yet.  Need to see if he needs paracentesis.  Continue remaining treatment.  Told him he will be out of here until possibly the weekend still needs more diuresis.  Probably another 20 L. Stop steroids, not wheezing. Remaining tx as per above.        Jayden Fernandez MD        
vein. Large fat-containing umbilical hernia.   Signed by: Adan Figueredo,  on 5/24/2024  1:35 PM      Spirometry 4/2024  Component  Ref Range & Units 10 mo ago   FEV 1 , POC  L 1.81   FEV1 % Pred POC  % 48   FVC, POC 3.50 L   FVC % Pred POC  % 70   FEV1/FVC, POC 67%              Impression    1.  Spirometry, lung volumes and flow-volume loops demonstrate a severe obstruction.  2.  No bronchodilator response by ATS criteria.  3.  Severe reduction in diffusion capacity.    Impression: Severe obstruction without bronchodilator response and severe reduction in diffusion capacity consistent with COPD.    Jose Carlos Boyer MD          MICRO:   Recent Labs     02/17/25  1457 02/17/25  1557 02/18/25  1216   CULTURE NO GROWTH 3 DAYS NO GROWTH 3 DAYS NO GROWTH 2 DAYS     No results for input(s): \"COVID19\" in the last 72 hours.       Assessment and Plan:  (Medical Decision Making)   Impression: The patient is a 64-year-old male with a history of severe COPD, smoking history (cessation 2014), PHTN, A fibrillation/flutter,  LILA needing CPAP, renal mass and polycythemia.  He has been followed by [facility] and has been on lasix at home. He was seen at [facility] (Dr Starks for partial vs radical). He was seen by us in December 2024. Here with gross volume overload, ascites and acute respiratory failure.  Status post paracentesis on 2/18 with a 5.5 L removed.  Some agitation issues overnight past 2 nights.          NEURO:   Sedation: Back on Precedex at this time.  Again agitation issues last night.  Will start him on Zyprexa 2.5 twice daily but will start once he wakes up.  Analgesia: As needed     CV:   Anasarca: Overall volume status is improving.  Edema is less.  Down to 873 mL.  Paracentesis on 2/18 with 5.5 L removed.  Keep in negative balance, but today may need to monitor since blood pressure is lower  Pulmonary hypertension: Severe pulmonary hypertension.  Will need further workup.  Heart failure reduced EF/acute 
Rectal Q6H PRN    sodium phosphate 15 mmol in sodium chloride 0.9 % 250 mL IVPB  15 mmol IntraVENous PRN    budesonide (PULMICORT) nebulizer suspension 500 mcg  0.5 mg Nebulization BID RT    ferrous sulfate (IRON 325) tablet 325 mg  325 mg Oral BID WC    [Held by provider] dilTIAZem (CARDIZEM CD) extended release capsule 120 mg  120 mg Oral Daily    empagliflozin (JARDIANCE) tablet 10 mg  10 mg Oral Daily    aspirin chewable tablet 81 mg  81 mg Oral Daily    amiodarone (CORDARONE) tablet 100 mg  100 mg Oral Daily    [Held by provider] spironolactone (ALDACTONE) tablet 25 mg  25 mg Oral Daily    heparin (porcine) injection 4,000 Units  4,000 Units IntraVENous PRN    heparin (porcine) injection 2,000 Units  2,000 Units IntraVENous PRN    heparin 25,000 units in dextrose 5% 250 mL (premix) infusion  5-30 Units/kg/hr IntraVENous Continuous       Signed:  Thien Belcher MD    Part of this note may have been written by using a voice dictation software.  The note has been proof read but may still contain some grammatical/other typographical errors.  
split/shared evaluation I performed performed a medically appropriate history and exam, counseled and educated the patient and/or family member, ordered medications, tests or procedures, documented information in EMR, and coordinated care. which accounted for 18 minutes of clinical time.     
time  End-stage COPD-severe we do follow-up as an outpatient.  Continue nebs.  No steroids at this time.  RENAL:  Acute on chronic kidney injury: Will get nephrology to follow.  High risk for worsening renal failure and need for dialysis.  Currently doing well with Bumex and creatinine is coming down to 2.07.  However please note patient will eventually need a nephrectomy over the right kidney since there is a possible mass there  Right renal mass: Eventually will need nephrectomy per prior notes from urology  Electrolytes: Follow-up lites and replace as needed  GI:   Nutrition: N.p.o.  Cirrhosis: Once effusion is drained we will do an ultrasound to see if there may be an etiology for the cirrhosis.  However this also paints a poor prognosis  HEME:   Anticoagulation: Needs anticoagulation for atrial fibrillation changed to heparin  ID:   Presumed sepsis: Patient with some cellulitis around umbilicus and lower abdomen also now noted around the right leg.  Continue Rocephin for now.  Follow-up cultures.  ENDO:   Follow-up sugars  Skin: no decub, turns, preventive care  Prophy: Protonix and on heparin drip      Condition overall is critical.  Keep patient in ICU.  High risk for decompensation.  When IR drains effusion we will see if they can also send that off for cytology  Follow-up remaining specialist    Will talk to the daughter and ex-wife when they come  Spoke with Nursing/Respiratory and IDT rounds in AM as well.     Time spent was 40 minutes    The patient is critically ill with respiratory failure, circulatory failure and requires high complexity decision making for assessment and support including frequent ventilator adjustment, frequent evaluation and titration of therapies , application of advanced monitoring technologies and extensive interpretation of multiple databases          Full Code       
using a voice dictation software.  The note has been proof read but may still contain some grammatical/other typographical errors.

## 2025-03-02 NOTE — DISCHARGE SUMMARY
Hospitalist Discharge Summary   Admit Date:  2025 12:42 PM   DC Note date: 3/2/2025  Name:  Tay Conklin   Age:  64 y.o.  Sex:  male  :  1960   MRN:  [id number]   Room:  [facility]  PCP:  Malu Asher PA-C    Presenting Complaint: Groin Swelling, Leg Swelling, and Shortness of Breath     Initial Admission Diagnosis: Shortness of breath [R06.02]  Anasarca [R60.1]  Penile edema [N48.89]  Acute on chronic respiratory failure with hypoxia and hypercapnia (HCC) [J96.21, J96.22]  Hypervolemia, unspecified hypervolemia type [E87.70]  Acute hypoxemic respiratory failure (HCC) [J96.01]  Acute kidney injury superimposed on CKD [N17.9, N18.9]     Problem List for this Hospitalization (present on admission):    Principal Problem:    Acute right-sided heart failure (HCC)  Active Problems:    Pulmonary HTN (HCC)    Acute cor pulmonale (HCC)    LILA (obstructive sleep apnea)    Polycythemia    COPD (chronic obstructive pulmonary disease) (HCC)    Persistent atrial fibrillation (HCC)    Cirrhosis of liver with ascites (HCC)    Pleural effusion    Severe obesity (BMI 35.0-39.9) with comorbidity    Decompensated hepatic cirrhosis (HCC)    Heart failure with mid-range ejection fraction (HCC)    Chronic respiratory failure with hypoxia on 6L (HCC)  Resolved Problems:    Shortness of breath    Acute renal failure (ARF)    Volume overload    Acute on chronic respiratory failure with hypoxia and hypercapnia (HCC)    Anasarca    Acute kidney injury superimposed on CKD    COPD exacerbation (HCC)      Hospital Course:  Tay Conklin is a 64 y.o. male with history of CKD 3, pulmonary hypertension, A-fib/flutter, obesity/LILA on CPAP who presented on  with dyspnea and abdominal pain.  In ED, CXR shows concerns for volume overload. CTAP on admission shows pulmonary edema superimposed RLL PNA; cirrhosis with moderate/large ascites; right renal mass/renal cell carcinoma not excluded.  He was placed on BiPAP in the

## 2025-03-02 NOTE — CARE COORDINATION
CM following. Pt now on 10L HF. NIV arranged with [facility] and delivered to pts room. PT/OT recommending HH, pt declined. Discharge ETA-Sunday.   
CM following. Pt remains on 45L HF. NIV being arranged with [facility]. Therapy recommending HH.   
Chart reviewed and pt discussed in am IDR as continues ICU s/p admission SOB, DONYA, volume overload, renal mass, cor pulmonale, HF, hypotension. Followed by Nephrology, GI, Cardiology, and Intensivist. Heparin gtt and Airvo 55% fio2. CM will need to follow for AYLIN needs/POC. LOS 4 days.    
MSN, Cm:  patient to be discharged home today with no services ordered.  Patient declined HH which was recommended by PT/OT.  Patient instructed PCP could order at later date if needed.  Patient and family agree with this discharge plan.  Patient has met all milestones for this admission.  Patient is current with [facility] for home oxygen needs.  Family to transport patient home.       03/02/25 1047   Service Assessment   Patient Orientation Alert and Oriented   Cognition Alert   Services At/After Discharge   Services At/After Discharge None   Mode of Transport at Discharge Other (see comment)  (family to transport)   Confirm Follow Up Transport Family   Condition of Participation: Discharge Planning   The Patient and/Or Patient Representative agree with the Discharge Plan? Yes       
Pt transferred from the ICU. Was admitted on 2/17/25 for pulmonary HTN. Currently, on 50L HF. PTA, pt lives alone. Indep with his ADLs. Drives. Inogen, BIPAP, O2 concentrator at home. Pts dtr and ex-wife assist with any needs. No current HH or rehab. PCP and insurance confirmed. PT/OT consulted and recommended HH.   
respiratory failure with hypoxia and hypercapnia [J96.21, J96.22]  Hypervolemia, unspecified hypervolemia type [E87.70]  Acute hypoxemic respiratory failure [J96.01]  Acute kidney injury superimposed on CKD (HCC) [N17.9, N18.9]

## 2025-03-02 NOTE — PLAN OF CARE
Problem: Chronic Conditions and Co-morbidities  Goal: Patient's chronic conditions and co-morbidity symptoms are monitored and maintained or improved  Outcome: HH/HSPC Resolved Met  Flowsheets (Taken 2/17/2025 1801)  Care Plan - Patient's Chronic Conditions and Co-Morbidity Symptoms are Monitored and Maintained or Improved:   Monitor and assess patient's chronic conditions and comorbid symptoms for stability, deterioration, or improvement   Collaborate with multidisciplinary team to address chronic and comorbid conditions and prevent exacerbation or deterioration     Problem: Discharge Planning  Goal: Discharge to home or other facility with appropriate resources  Outcome: HH/HSPC Resolved Met  Flowsheets (Taken 2/17/2025 1801)  Discharge to home or other facility with appropriate resources:   Identify barriers to discharge with patient and caregiver   Arrange for needed discharge resources and transportation as appropriate   Identify discharge learning needs (meds, wound care, etc)   Arrange for interpreters to assist at discharge as needed   Refer to discharge planning if patient needs post-hospital services based on physician order or complex needs related to functional status, cognitive ability or social support system     Problem: Skin/Tissue Integrity  Goal: Skin integrity remains intact  Description: 1.  Monitor for areas of redness and/or skin breakdown  2.  Assess vascular access sites hourly  3.  Every 4-6 hours minimum:  Change oxygen saturation probe site  4.  Every 4-6 hours:  If on nasal continuous positive airway pressure, respiratory therapy assess nares and determine need for appliance change or resting period  Outcome: HH/HSPC Resolved Met     
  Problem: Chronic Conditions and Co-morbidities  Goal: Patient's chronic conditions and co-morbidity symptoms are monitored and maintained or improved  Outcome: Progressing     Problem: Skin/Tissue Integrity  Goal: Skin integrity remains intact  Description: 1.  Monitor for areas of redness and/or skin breakdown  2.  Assess vascular access sites hourly  3.  Every 4-6 hours minimum:  Change oxygen saturation probe site  4.  Every 4-6 hours:  If on nasal continuous positive airway pressure, respiratory therapy assess nares and determine need for appliance change or resting period  Outcome: Progressing  Flowsheets (Taken 2/25/2025 2012)  Skin Integrity Remains Intact:   Monitor for areas of redness and/or skin breakdown   Every 4-6 hours minimum: Change oxygen saturation probe site   Every 4-6 hours: If on nasal continuous positive airway pressure, respiratory therapy assesses nares and determine need for appliance change or resting period     
  Problem: Chronic Conditions and Co-morbidities  Goal: Patient's chronic conditions and co-morbidity symptoms are monitored and maintained or improved  Outcome: Progressing  Flowsheets (Taken 2/27/2025 0228)  Care Plan - Patient's Chronic Conditions and Co-Morbidity Symptoms are Monitored and Maintained or Improved:   Update acute care plan with appropriate goals if chronic or comorbid symptoms are exacerbated and prevent overall improvement and discharge   Monitor and assess patient's chronic conditions and comorbid symptoms for stability, deterioration, or improvement   Collaborate with multidisciplinary team to address chronic and comorbid conditions and prevent exacerbation or deterioration     
  Problem: Chronic Conditions and Co-morbidities  Goal: Patient's chronic conditions and co-morbidity symptoms are monitored and maintained or improved  Recent Flowsheet Documentation  Taken 2/23/2025 2100 by Jeramie Simmons RN  Care Plan - Patient's Chronic Conditions and Co-Morbidity Symptoms are Monitored and Maintained or Improved:   Monitor and assess patient's chronic conditions and comorbid symptoms for stability, deterioration, or improvement   Collaborate with multidisciplinary team to address chronic and comorbid conditions and prevent exacerbation or deterioration   Update acute care plan with appropriate goals if chronic or comorbid symptoms are exacerbated and prevent overall improvement and discharge     Problem: Chronic Conditions and Co-morbidities  Goal: Patient's chronic conditions and co-morbidity symptoms are monitored and maintained or improved  Outcome: Progressing  Flowsheets (Taken 2/23/2025 2100)  Care Plan - Patient's Chronic Conditions and Co-Morbidity Symptoms are Monitored and Maintained or Improved:   Monitor and assess patient's chronic conditions and comorbid symptoms for stability, deterioration, or improvement   Collaborate with multidisciplinary team to address chronic and comorbid conditions and prevent exacerbation or deterioration   Update acute care plan with appropriate goals if chronic or comorbid symptoms are exacerbated and prevent overall improvement and discharge     Problem: Discharge Planning  Goal: Discharge to home or other facility with appropriate resources  Recent Flowsheet Documentation  Taken 2/23/2025 2100 by Jeramie Simmons, RN  Discharge to home or other facility with appropriate resources:   Identify barriers to discharge with patient and caregiver   Arrange for needed discharge resources and transportation as appropriate   Identify discharge learning needs (meds, wound care, etc)   Refer to discharge planning if patient needs post-hospital 
  Problem: Chronic Conditions and Co-morbidities  Goal: Patient's chronic conditions and co-morbidity symptoms are monitored and maintained or improved  Recent Flowsheet Documentation  Taken 2/26/2025 0805 by Beena Causey RN  Care Plan - Patient's Chronic Conditions and Co-Morbidity Symptoms are Monitored and Maintained or Improved:   Monitor and assess patient's chronic conditions and comorbid symptoms for stability, deterioration, or improvement   Collaborate with multidisciplinary team to address chronic and comorbid conditions and prevent exacerbation or deterioration   Update acute care plan with appropriate goals if chronic or comorbid symptoms are exacerbated and prevent overall improvement and discharge     Problem: Chronic Conditions and Co-morbidities  Goal: Patient's chronic conditions and co-morbidity symptoms are monitored and maintained or improved  2/26/2025 1110 by Beena Causey RN  Outcome: Progressing  Flowsheets (Taken 2/26/2025 0805)  Care Plan - Patient's Chronic Conditions and Co-Morbidity Symptoms are Monitored and Maintained or Improved:   Monitor and assess patient's chronic conditions and comorbid symptoms for stability, deterioration, or improvement   Collaborate with multidisciplinary team to address chronic and comorbid conditions and prevent exacerbation or deterioration   Update acute care plan with appropriate goals if chronic or comorbid symptoms are exacerbated and prevent overall improvement and discharge  2/25/2025 2347 by Sofia Lynch RN  Outcome: Progressing     Problem: Discharge Planning  Goal: Discharge to home or other facility with appropriate resources  Recent Flowsheet Documentation  Taken 2/26/2025 0805 by Beena Causey RN  Discharge to home or other facility with appropriate resources:   Identify barriers to discharge with patient and caregiver   Arrange for needed discharge resources and transportation as appropriate   Identify discharge learning 
  Problem: Chronic Conditions and Co-morbidities  Goal: Patient's chronic conditions and co-morbidity symptoms are monitored and maintained or improved  Recent Flowsheet Documentation  Taken 2/27/2025 0228 by Sofia Lynch RN  Care Plan - Patient's Chronic Conditions and Co-Morbidity Symptoms are Monitored and Maintained or Improved:   Update acute care plan with appropriate goals if chronic or comorbid symptoms are exacerbated and prevent overall improvement and discharge   Monitor and assess patient's chronic conditions and comorbid symptoms for stability, deterioration, or improvement   Collaborate with multidisciplinary team to address chronic and comorbid conditions and prevent exacerbation or deterioration     Problem: Chronic Conditions and Co-morbidities  Goal: Patient's chronic conditions and co-morbidity symptoms are monitored and maintained or improved  2/27/2025 1021 by Beena Causey RN  Outcome: Progressing  2/27/2025 0228 by Sofia Lynch RN  Outcome: Progressing  Flowsheets (Taken 2/27/2025 0228)  Care Plan - Patient's Chronic Conditions and Co-Morbidity Symptoms are Monitored and Maintained or Improved:   Update acute care plan with appropriate goals if chronic or comorbid symptoms are exacerbated and prevent overall improvement and discharge   Monitor and assess patient's chronic conditions and comorbid symptoms for stability, deterioration, or improvement   Collaborate with multidisciplinary team to address chronic and comorbid conditions and prevent exacerbation or deterioration     Problem: Skin/Tissue Integrity  Goal: Skin integrity remains intact  Description: 1.  Monitor for areas of redness and/or skin breakdown  2.  Assess vascular access sites hourly  3.  Every 4-6 hours minimum:  Change oxygen saturation probe site  4.  Every 4-6 hours:  If on nasal continuous positive airway pressure, respiratory therapy assess nares and determine need for appliance change or resting 
  Problem: Chronic Conditions and Co-morbidities  Goal: Patient's chronic conditions and co-morbidity symptoms are monitored and maintained or improved  Recent Flowsheet Documentation  Taken 2/27/2025 2030 by Jeramie Simmons, RN  Care Plan - Patient's Chronic Conditions and Co-Morbidity Symptoms are Monitored and Maintained or Improved:   Monitor and assess patient's chronic conditions and comorbid symptoms for stability, deterioration, or improvement   Collaborate with multidisciplinary team to address chronic and comorbid conditions and prevent exacerbation or deterioration   Update acute care plan with appropriate goals if chronic or comorbid symptoms are exacerbated and prevent overall improvement and discharge     Problem: Chronic Conditions and Co-morbidities  Goal: Patient's chronic conditions and co-morbidity symptoms are monitored and maintained or improved  2/28/2025 0005 by Jeramie Simmons RN  Outcome: Progressing  Flowsheets (Taken 2/27/2025 2030)  Care Plan - Patient's Chronic Conditions and Co-Morbidity Symptoms are Monitored and Maintained or Improved:   Monitor and assess patient's chronic conditions and comorbid symptoms for stability, deterioration, or improvement   Collaborate with multidisciplinary team to address chronic and comorbid conditions and prevent exacerbation or deterioration   Update acute care plan with appropriate goals if chronic or comorbid symptoms are exacerbated and prevent overall improvement and discharge  2/27/2025 1021 by Beena Causey RN  Outcome: Progressing     Problem: Discharge Planning  Goal: Discharge to home or other facility with appropriate resources  Recent Flowsheet Documentation  Taken 2/27/2025 2030 by Jeramie Simmons, RN  Discharge to home or other facility with appropriate resources:   Identify barriers to discharge with patient and caregiver   Arrange for needed discharge resources and transportation as appropriate   Identify discharge 
  Problem: Chronic Conditions and Co-morbidities  Goal: Patient's chronic conditions and co-morbidity symptoms are monitored and maintained or improved  Recent Flowsheet Documentation  Taken 2/28/2025 0800 by Sondra Naranjo, RN  Care Plan - Patient's Chronic Conditions and Co-Morbidity Symptoms are Monitored and Maintained or Improved:   Monitor and assess patient's chronic conditions and comorbid symptoms for stability, deterioration, or improvement   Collaborate with multidisciplinary team to address chronic and comorbid conditions and prevent exacerbation or deterioration   Update acute care plan with appropriate goals if chronic or comorbid symptoms are exacerbated and prevent overall improvement and discharge     Problem: Chronic Conditions and Co-morbidities  Goal: Patient's chronic conditions and co-morbidity symptoms are monitored and maintained or improved  2/28/2025 1129 by Sondra Naranjo, RN  Outcome: Progressing  Flowsheets (Taken 2/28/2025 0800)  Care Plan - Patient's Chronic Conditions and Co-Morbidity Symptoms are Monitored and Maintained or Improved:   Monitor and assess patient's chronic conditions and comorbid symptoms for stability, deterioration, or improvement   Collaborate with multidisciplinary team to address chronic and comorbid conditions and prevent exacerbation or deterioration   Update acute care plan with appropriate goals if chronic or comorbid symptoms are exacerbated and prevent overall improvement and discharge  2/28/2025 0005 by Jeramie Simmons RN  Outcome: Progressing  Flowsheets (Taken 2/27/2025 2030)  Care Plan - Patient's Chronic Conditions and Co-Morbidity Symptoms are Monitored and Maintained or Improved:   Monitor and assess patient's chronic conditions and comorbid symptoms for stability, deterioration, or improvement   Collaborate with multidisciplinary team to address chronic and comorbid conditions and prevent exacerbation or deterioration   Update acute care 
  Problem: Respiratory - Adult  Goal: Achieves optimal ventilation and oxygenation  2/24/2025 1546 by Kay Conrad RCP  Outcome: Progressing  Flowsheets (Taken 2/24/2025 1546)  Achieves optimal ventilation and oxygenation:   Assess for changes in respiratory status   Assess for changes in mentation and behavior   Position to facilitate oxygenation and minimize respiratory effort   Oxygen supplementation based on oxygen saturation or arterial blood gases   Encourage broncho-pulmonary hygiene including cough, deep breathe, incentive spirometry   Assess and instruct to report shortness of breath or any respiratory difficulty   Respiratory therapy support as indicated     
  Problem: Respiratory - Adult  Goal: Achieves optimal ventilation and oxygenation  2/28/2025 0803 by Cb Meyer, RCVINAY  Outcome: Progressing  Flowsheets (Taken 2/28/2025 0803)  Achieves optimal ventilation and oxygenation:   Assess for changes in respiratory status   Position to facilitate oxygenation and minimize respiratory effort   Respiratory therapy support as indicated   Assess for changes in mentation and behavior   Oxygen supplementation based on oxygen saturation or arterial blood gases   Encourage broncho-pulmonary hygiene including cough, deep breathe, incentive spirometry   Assess and instruct to report shortness of breath or any respiratory difficulty     
  Problem: Respiratory - Adult  Goal: Achieves optimal ventilation and oxygenation  3/1/2025 0857 by Lelo Mason RCP  Outcome: Progressing  Flowsheets (Taken 3/1/2025 0504 by Lb Dey RCP)  Achieves optimal ventilation and oxygenation:   Respiratory therapy support as indicated   Assess for changes in respiratory status  3/1/2025 0304 by Digna Burroughs RN  Outcome: Progressing  Flowsheets  Taken 3/1/2025 0029 by Lb Dey RCP  Achieves optimal ventilation and oxygenation:   Assess for changes in respiratory status   Respiratory therapy support as indicated   Assess and instruct to report shortness of breath or any respiratory difficulty  Taken 2/28/2025 2035 by Digna Burroughs, RN  Achieves optimal ventilation and oxygenation: Assess for changes in respiratory status     
  Problem: Respiratory - Adult  Goal: Achieves optimal ventilation and oxygenation  Outcome: Progressing     
  Problem: Safety - Adult  Goal: Free from fall injury  Outcome: Progressing     Problem: Pain  Goal: Verbalizes/displays adequate comfort level or baseline comfort level  Outcome: Progressing     Problem: Chronic Conditions and Co-morbidities  Goal: Patient's chronic conditions and co-morbidity symptoms are monitored and maintained or improved  Outcome: Progressing  Flowsheets (Taken 2/17/2025 1801 by Marquis Dickens RN)  Care Plan - Patient's Chronic Conditions and Co-Morbidity Symptoms are Monitored and Maintained or Improved:   Monitor and assess patient's chronic conditions and comorbid symptoms for stability, deterioration, or improvement   Collaborate with multidisciplinary team to address chronic and comorbid conditions and prevent exacerbation or deterioration     Problem: Skin/Tissue Integrity  Goal: Skin integrity remains intact  Description: 1.  Monitor for areas of redness and/or skin breakdown  2.  Assess vascular access sites hourly  3.  Every 4-6 hours minimum:  Change oxygen saturation probe site  4.  Every 4-6 hours:  If on nasal continuous positive airway pressure, respiratory therapy assess nares and determine need for appliance change or resting period  Outcome: Progressing     
  Problem: Safety - Adult  Goal: Free from fall injury  Outcome: Progressing     Problem: Pain  Goal: Verbalizes/displays adequate comfort level or baseline comfort level  Outcome: Progressing     Problem: Skin/Tissue Integrity  Goal: Skin integrity remains intact  Outcome: Progressing     
  Problem: Safety - Adult  Goal: Free from fall injury  Outcome: Progressing  Flowsheets (Taken 2/28/2025 2035)  Free From Fall Injury: Instruct family/caregiver on patient safety     Problem: Pain  Goal: Verbalizes/displays adequate comfort level or baseline comfort level  Outcome: Progressing     Problem: Chronic Conditions and Co-morbidities  Goal: Patient's chronic conditions and co-morbidity symptoms are monitored and maintained or improved  Outcome: Progressing  Flowsheets (Taken 2/28/2025 2035)  Care Plan - Patient's Chronic Conditions and Co-Morbidity Symptoms are Monitored and Maintained or Improved: Monitor and assess patient's chronic conditions and comorbid symptoms for stability, deterioration, or improvement     Problem: Skin/Tissue Integrity  Goal: Skin integrity remains intact  Description: 1.  Monitor for areas of redness and/or skin breakdown  2.  Assess vascular access sites hourly  3.  Every 4-6 hours minimum:  Change oxygen saturation probe site  4.  Every 4-6 hours:  If on nasal continuous positive airway pressure, respiratory therapy assess nares and determine need for appliance change or resting period  Outcome: Progressing  Flowsheets (Taken 2/28/2025 2035)  Skin Integrity Remains Intact: Monitor for areas of redness and/or skin breakdown     Problem: ABCDS Injury Assessment  Goal: Absence of physical injury  Outcome: Progressing  Flowsheets (Taken 2/28/2025 2035)  Absence of Physical Injury: Implement safety measures based on patient assessment     Problem: Respiratory - Adult  Goal: Achieves optimal ventilation and oxygenation  Outcome: Progressing  Flowsheets  Taken 3/1/2025 0029 by Lb Dey RCP  Achieves optimal ventilation and oxygenation:   Assess for changes in respiratory status   Respiratory therapy support as indicated   Assess and instruct to report shortness of breath or any respiratory difficulty  Taken 2/28/2025 2035 by Digna Burroughs, RN  Achieves optimal ventilation 
SHAWN Sawyer  Achieves optimal ventilation and oxygenation:   Assess for changes in respiratory status   Assess for changes in mentation and behavior     
intact  Description: 1.  Monitor for areas of redness and/or skin breakdown  2.  Assess vascular access sites hourly  3.  Every 4-6 hours minimum:  Change oxygen saturation probe site  4.  Every 4-6 hours:  If on nasal continuous positive airway pressure, respiratory therapy assess nares and determine need for appliance change or resting period  2/18/2025 0709 by Marquis Dickens, RN  Outcome: HH/HSPC Progressing  Flowsheets (Taken 2/18/2025 0700)  Skin Integrity Remains Intact:   Monitor for areas of redness and/or skin breakdown   Assess vascular access sites hourly  2/17/2025 2056 by Randa Patiño, RN  Outcome: Progressing     Problem: ABCDS Injury Assessment  Goal: Absence of physical injury  Outcome: HH/HSPC Progressing

## 2025-03-04 ENCOUNTER — CLINICAL DOCUMENTATION (OUTPATIENT)
Dept: PULMONOLOGY | Age: 65
End: 2025-03-04

## 2025-03-20 ENCOUNTER — TELEPHONE (OUTPATIENT)
Age: 65
End: 2025-03-20

## 2025-03-20 NOTE — TELEPHONE ENCOUNTER
Cardiac Clearance        [facility]   : Peyman Gordon MD  Contact Phone Number: [phone]  Fax Number: [phone]  Date of Surgery/Procedure: 3/25/25  Type of Surgery or Procedure: right kidney removed  Type of Anesthesia: general  Type of Clearance Requested: Cardiac Clearance and Medication Hold  Medication to Hold:Eliquis  Days to Hold: ?

## 2025-03-21 DIAGNOSIS — Z01.810 PRE-OPERATIVE CARDIOVASCULAR EXAMINATION: Primary | ICD-10-CM

## 2025-03-21 NOTE — TELEPHONE ENCOUNTER
Patient notified that he needs a stress test prior to surgery. Patient aware someone will call him to get this scheduled. Urology office notified of stress test need prior to receiving clearance for surgery.

## 2025-03-21 NOTE — TELEPHONE ENCOUNTER
I called the pt to schedule and he said he is NOT doing a 2 day study and wants to speak to the doctor.

## 2025-03-21 NOTE — TELEPHONE ENCOUNTER
Pt was scheduled for the first available NST on 4/3 and 4/4 (he has to have a 2 day due to weight). Pt states we do not have is correct weight on file he says he is only 230 (which is still a 2 day test). He stated we need to understand he needs to work and cannot be expected to come to back to back appts. Pt chose to cancel the scheduled tests and wants to know what other options he has.

## 2025-03-21 NOTE — TELEPHONE ENCOUNTER
Jeremy Broderick DO Bennett, Sherry, RN  Caller: Unspecified (Yesterday,  9:31 AM)  Unfortunately I do not think we would have any other options unless one of our cameras at another location would allow us to do a 1 day protocol for obese patients. I am not sure if [facility] has an option for a one day protocol.

## 2025-03-21 NOTE — TELEPHONE ENCOUNTER
Jeremy Broderick, Bouchra Fu RN  Caller: Unspecified (Yesterday,  9:31 AM)  Patient needs a stress test first. Order placed. Can we arrange this for him? Thank you.

## 2025-03-21 NOTE — TELEPHONE ENCOUNTER
Jeremy Broderick DO Bennett, Sherry, RN  Caller: Unspecified (Yesterday,  9:31 AM)  I just spoke with patient. Looking at his echo images I'm not sure we will get a diagnostic test that way. Offered cath to him but not ideal with his renal dysfunction - he would like to defer on this. He is willing to do a nuclear stress test (2 day) but will have to schedule this out in May. He would prefer this over losing work and understands it will delay his kidney surgery. Thank you.

## 2025-03-21 NOTE — TELEPHONE ENCOUNTER
Patient notified of MD response. Patient willing to schedule test now. Message forwarded to get patient scheduled.

## 2025-03-25 ENCOUNTER — TELEPHONE (OUTPATIENT)
Dept: PULMONOLOGY | Age: 65
End: 2025-03-25

## 2025-03-25 ENCOUNTER — TELEPHONE (OUTPATIENT)
Age: 65
End: 2025-03-25

## 2025-03-25 NOTE — TELEPHONE ENCOUNTER
Colleen Saldivar, Teresa Melo, RN  This bala is on my schedule at 2:00 today  He needs rescheduled to end of May  He was suppose to have PVI but got cancelled secondary to volume overload.  He has upcoming stress test scheduled middle of May per Dr Davie Vasquez wants him to  wait to have ablation untill ischemic evaluation completed    Can you just schedule him at end of May with Dr Vasquez  Thank you      Moved up patient's nuclear stress test per patient request. Verified appointments with Mar.    Scheduled patient for follow up with Dr. Vasquez after testing. Need to discuss re-scheduling ablation at OV.    Patient in agreement of treatment plan.

## 2025-03-25 NOTE — TELEPHONE ENCOUNTER
Peer to peer done regarding NIV denial. Insurance will not approve NIV and states patient will need to try BiPAP and show compliance and failure before being approved for NIV. Updated [facility]. Will plan to change patient to BiPAP and have follow up in sleep office.    Luzmaria Poe, APRN - CNP

## 2025-04-07 ENCOUNTER — TELEPHONE (OUTPATIENT)
Age: 65
End: 2025-04-07

## 2025-04-07 NOTE — TELEPHONE ENCOUNTER
Tay haney [facility]iology Clinical Staff (supporting Jeremy Broderick DO)         4/7/25  3:07 PM  Pins and needles like pain is there anything i can take to relieve pain? Have been icing and weight is at a 15 year low at 235…   Help  Tay

## 2025-04-07 NOTE — TELEPHONE ENCOUNTER
Patient reports he has had pins and needles in his feet and ankles the last 2 days. Reports he has had significant fluid diuresed with Bumex. Patient wondering if it could be dehydration. Advised to increase fluids intake the next several days. Monitor weights. Call if no improvement in symptoms.Patient voices understanding.

## 2025-04-22 ENCOUNTER — RESULTS FOLLOW-UP (OUTPATIENT)
Age: 65
End: 2025-04-22

## 2025-05-12 ENCOUNTER — OFFICE VISIT (OUTPATIENT)
Age: 65
End: 2025-05-12
Payer: COMMERCIAL

## 2025-05-12 VITALS
SYSTOLIC BLOOD PRESSURE: 94 MMHG | HEIGHT: 74 IN | DIASTOLIC BLOOD PRESSURE: 71 MMHG | BODY MASS INDEX: 31.98 KG/M2 | HEART RATE: 80 BPM | WEIGHT: 249.2 LBS

## 2025-05-12 DIAGNOSIS — K42.9 UMBILICAL HERNIA WITHOUT OBSTRUCTION AND WITHOUT GANGRENE: Primary | ICD-10-CM

## 2025-05-12 PROCEDURE — 1036F TOBACCO NON-USER: CPT | Performed by: SURGERY

## 2025-05-12 PROCEDURE — 99203 OFFICE O/P NEW LOW 30 MIN: CPT | Performed by: SURGERY

## 2025-05-12 PROCEDURE — G8417 CALC BMI ABV UP PARAM F/U: HCPCS | Performed by: SURGERY

## 2025-05-12 PROCEDURE — 3017F COLORECTAL CA SCREEN DOC REV: CPT | Performed by: SURGERY

## 2025-05-12 PROCEDURE — G8427 DOCREV CUR MEDS BY ELIG CLIN: HCPCS | Performed by: SURGERY

## 2025-05-12 NOTE — PROGRESS NOTES
Jong Kay MD   General and Robotic surgery  [address], [address]  [place] [address]  Phone [phone]   Fax [phone]      Date of visit: 2025      Primary/Requesting provider: Malu Asher PA-C         Name: Tay Conklin      MRN: [id number]       : 1960       Age: 65 y.o.    Sex: male        PCP: Malu Asher PA-C     CC:    Chief Complaint   Patient presents with    New Patient     NP - L umbilical hernia, 3-4 yrs, no pain/nausea/vomiting, fist-sized hernia, no previous abdominalor hernia surgeries / self-referred *pt request early appointment*         HPI:     Tay Conklin is a 65 y.o. male with a longstanding periumbilical hernia that has grown to approximately the size of an orange.  It is partially reducible and has fat.  Interestingly, the patient has denied having any history of liver disease, although he is on several medications prescribed for that reason.  He has cirrhosis and ascites.  We will need to check a full round of labs before we schedule him for surgery, specifically his coag studies.  He also has atrial fibrillation for which he is undergoing an ablative procedure on .  Otherwise it appears he has never had abdominal surgery or prior hernia surgery.  I will need to communicate with him to let him know that he has a higher risk of infection and wound problems with cirrhosis and also general anesthesia will be less tolerated by his liver because of this as well.  The hernia is fairly large and I think it could certainly contain bowel, so I think it is imperative to repair it.    Past Medical History:   Diagnosis Date    Atrial fibrillation (HCC)     COPD (chronic obstructive pulmonary disease) (HCC)         Past Surgical History:   Procedure Laterality Date    COLONOSCOPY N/A 3/5/2024    COLONOSCOPY DIAGNOSTIC performed by Tono Napoles MD at [facility] ENDOSCOPY    UPPER GASTROINTESTINAL ENDOSCOPY N/A 3/5/2024

## 2025-05-14 ENCOUNTER — OFFICE VISIT (OUTPATIENT)
Dept: FAMILY MEDICINE CLINIC | Facility: CLINIC | Age: 65
End: 2025-05-14
Payer: COMMERCIAL

## 2025-05-14 VITALS
BODY MASS INDEX: 31.7 KG/M2 | TEMPERATURE: 98 F | HEIGHT: 74 IN | HEART RATE: 84 BPM | RESPIRATION RATE: 18 BRPM | SYSTOLIC BLOOD PRESSURE: 124 MMHG | DIASTOLIC BLOOD PRESSURE: 72 MMHG | OXYGEN SATURATION: 95 % | WEIGHT: 247 LBS

## 2025-05-14 DIAGNOSIS — J44.9 CHRONIC OBSTRUCTIVE PULMONARY DISEASE, UNSPECIFIED COPD TYPE (HCC): Chronic | ICD-10-CM

## 2025-05-14 DIAGNOSIS — G47.33 OSA (OBSTRUCTIVE SLEEP APNEA): Primary | Chronic | ICD-10-CM

## 2025-05-14 DIAGNOSIS — R18.8 CIRRHOSIS OF LIVER WITH ASCITES, UNSPECIFIED HEPATIC CIRRHOSIS TYPE (HCC): ICD-10-CM

## 2025-05-14 DIAGNOSIS — I48.19 PERSISTENT ATRIAL FIBRILLATION (HCC): Chronic | ICD-10-CM

## 2025-05-14 DIAGNOSIS — K74.60 CIRRHOSIS OF LIVER WITH ASCITES, UNSPECIFIED HEPATIC CIRRHOSIS TYPE (HCC): ICD-10-CM

## 2025-05-14 PROBLEM — I48.91 ATRIAL FIB/FLUTTER, TRANSIENT (HCC): Chronic | Status: ACTIVE | Noted: 2024-10-16

## 2025-05-14 PROBLEM — R31.0 GROSS HEMATURIA: Status: ACTIVE | Noted: 2024-10-16

## 2025-05-14 PROBLEM — N18.31 CHRONIC KIDNEY DISEASE, STAGE 3A (HCC): Status: ACTIVE | Noted: 2024-10-16

## 2025-05-14 PROBLEM — N28.89 RIGHT RENAL MASS: Status: ACTIVE | Noted: 2025-01-17

## 2025-05-14 PROBLEM — Z91.148 NONCOMPLIANCE WITH MEDICATION REGIMEN: Status: RESOLVED | Noted: 2024-03-04 | Resolved: 2025-05-14

## 2025-05-14 PROBLEM — I48.92 ATRIAL FIB/FLUTTER, TRANSIENT (HCC): Chronic | Status: ACTIVE | Noted: 2024-10-16

## 2025-05-14 PROBLEM — I10 HYPERTENSION: Status: ACTIVE | Noted: 2021-08-27

## 2025-05-14 PROCEDURE — 3074F SYST BP LT 130 MM HG: CPT | Performed by: NURSE PRACTITIONER

## 2025-05-14 PROCEDURE — 3017F COLORECTAL CA SCREEN DOC REV: CPT | Performed by: NURSE PRACTITIONER

## 2025-05-14 PROCEDURE — 3078F DIAST BP <80 MM HG: CPT | Performed by: NURSE PRACTITIONER

## 2025-05-14 PROCEDURE — 99204 OFFICE O/P NEW MOD 45 MIN: CPT | Performed by: NURSE PRACTITIONER

## 2025-05-14 PROCEDURE — 3023F SPIROM DOC REV: CPT | Performed by: NURSE PRACTITIONER

## 2025-05-14 PROCEDURE — 1036F TOBACCO NON-USER: CPT | Performed by: NURSE PRACTITIONER

## 2025-05-14 PROCEDURE — G8417 CALC BMI ABV UP PARAM F/U: HCPCS | Performed by: NURSE PRACTITIONER

## 2025-05-14 PROCEDURE — G8427 DOCREV CUR MEDS BY ELIG CLIN: HCPCS | Performed by: NURSE PRACTITIONER

## 2025-05-14 SDOH — ECONOMIC STABILITY: FOOD INSECURITY: WITHIN THE PAST 12 MONTHS, YOU WORRIED THAT YOUR FOOD WOULD RUN OUT BEFORE YOU GOT MONEY TO BUY MORE.: NEVER TRUE

## 2025-05-14 SDOH — ECONOMIC STABILITY: FOOD INSECURITY: WITHIN THE PAST 12 MONTHS, THE FOOD YOU BOUGHT JUST DIDN'T LAST AND YOU DIDN'T HAVE MONEY TO GET MORE.: NEVER TRUE

## 2025-05-14 ASSESSMENT — PATIENT HEALTH QUESTIONNAIRE - PHQ9
SUM OF ALL RESPONSES TO PHQ QUESTIONS 1-9: 0
1. LITTLE INTEREST OR PLEASURE IN DOING THINGS: NOT AT ALL
SUM OF ALL RESPONSES TO PHQ QUESTIONS 1-9: 0
2. FEELING DOWN, DEPRESSED OR HOPELESS: NOT AT ALL
SUM OF ALL RESPONSES TO PHQ QUESTIONS 1-9: 0
SUM OF ALL RESPONSES TO PHQ QUESTIONS 1-9: 0

## 2025-05-14 ASSESSMENT — ENCOUNTER SYMPTOMS: RESPIRATORY NEGATIVE: 1

## 2025-05-14 NOTE — PROGRESS NOTES
Tay Conklin (:  1960) is a 65 y.o. male,New patient, here for evaluation of the following chief complaint(s):  Establish Care         Assessment & Plan  LILA (obstructive sleep apnea)   Chronic, not at goal (unstable), lifestyle modifications recommended         Chronic obstructive pulmonary disease, unspecified COPD type (HCC)   Monitored by specialist- no acute findings meriting change in the plan         Persistent atrial fibrillation (HCC)   Monitored by specialist- no acute findings meriting change in the plan         Cirrhosis of liver with ascites, unspecified hepatic cirrhosis type (HCC)   Chronic, not at goal (unstable), recommend GI referral once he gets all other procedures done.            Assessment & Plan  1. Chronic Obstructive Pulmonary Disease (COPD).  - Reports significant improvement in breathing after losing 100 pounds and no longer requires continuous oxygen therapy.  - Uses Symbicort inhaler and has a rescue inhaler but does not use it frequently.  - Will discuss the necessity of these medications with his pulmonologist.  - Scheduled to see pulmonologist after heart ablation procedure.    2. Atrial Fibrillation.  - Scheduled for a heart ablation procedure on 2025 with Dr. Vasquez.  - Currently taking amiodarone and diltiazem 120 mg for atrial fibrillation.  - Stress test was normal.  - Will follow up with cardiologist post-procedure.    3. Hypertension.  - Blood pressure today is 124/72 mmHg.  - Monitors blood pressure at home and adjusts medication accordingly.  - Takes metoprolol 100 mg (half tablet) and midodrine as needed.      4. Edema.  - Experienced severe edema last year, which has significantly improved after losing 100 pounds and taking diuretic medication.  - Takes Bumex 2 mg as needed based on daily weight monitoring.  - No current swelling noted.  - Continues to monitor weight daily and adjust Bumex dosage accordingly.    5. Cirrhosis of the Liver.  - History of

## 2025-05-14 NOTE — ASSESSMENT & PLAN NOTE
Chronic, not at goal (unstable), recommend GI referral once he gets all other procedures done.

## 2025-05-27 ENCOUNTER — TELEPHONE (OUTPATIENT)
Age: 65
End: 2025-05-27

## 2025-05-27 NOTE — TELEPHONE ENCOUNTER
P2P has been scheduled for 5/29/25 ([facility] had no availability for 5/28) at 12 pm EST they will call Heathers call first and then my desk phone if they are unable to reach Colleen.

## 2025-05-27 NOTE — TELEPHONE ENCOUNTER
Insurance has denied CPT codes 68768, 76883, 49491, 99624, 64385, and 78812 because they have deemed them as not medically necessary. They would like to see notes that pt doesn't have thyroid or any other issue that could case irregular heart rate. They have offered us a p2p pr stated we can appeal this decision, which would you like to do/done?       Message from [facility]:  Case denied per [facility] portal denial #[id number] Uploaded denial letter with info for P2P. Needs documentation that pt doesn't have thyroid or any other issue that could case irregular heart rate

## 2025-05-30 ENCOUNTER — TELEPHONE (OUTPATIENT)
Age: 65
End: 2025-05-30

## 2025-05-30 DIAGNOSIS — I48.19 PERSISTENT ATRIAL FIBRILLATION (HCC): Primary | ICD-10-CM

## 2025-05-30 DIAGNOSIS — I48.19 PERSISTENT ATRIAL FIBRILLATION (HCC): ICD-10-CM

## 2025-05-30 LAB
BASOPHILS # BLD: 0.07 K/UL (ref 0–0.2)
BASOPHILS NFR BLD: 0.6 % (ref 0–2)
DIFFERENTIAL METHOD BLD: ABNORMAL
EOSINOPHIL # BLD: 0.29 K/UL (ref 0–0.8)
EOSINOPHIL NFR BLD: 2.5 % (ref 0.5–7.8)
ERYTHROCYTE [DISTWIDTH] IN BLOOD BY AUTOMATED COUNT: 15.9 % (ref 11.9–14.6)
HCT VFR BLD AUTO: 50.5 % (ref 41.1–50.3)
HGB BLD-MCNC: 16.1 G/DL (ref 13.6–17.2)
IMM GRANULOCYTES # BLD AUTO: 0.07 K/UL (ref 0–0.5)
IMM GRANULOCYTES NFR BLD AUTO: 0.6 % (ref 0–5)
LYMPHOCYTES # BLD: 2.34 K/UL (ref 0.5–4.6)
LYMPHOCYTES NFR BLD: 20.3 % (ref 13–44)
MCH RBC QN AUTO: 30.9 PG (ref 26.1–32.9)
MCHC RBC AUTO-ENTMCNC: 31.9 G/DL (ref 31.4–35)
MCV RBC AUTO: 96.9 FL (ref 82–102)
MONOCYTES # BLD: 0.93 K/UL (ref 0.1–1.3)
MONOCYTES NFR BLD: 8.1 % (ref 4–12)
NEUTS SEG # BLD: 7.84 K/UL (ref 1.7–8.2)
NEUTS SEG NFR BLD: 67.9 % (ref 43–78)
NRBC # BLD: 0 K/UL (ref 0–0.2)
PLATELET # BLD AUTO: 368 K/UL (ref 150–450)
PMV BLD AUTO: 10.6 FL (ref 9.4–12.3)
RBC # BLD AUTO: 5.21 M/UL (ref 4.23–5.6)
WBC # BLD AUTO: 11.5 K/UL (ref 4.3–11.1)

## 2025-05-30 NOTE — TELEPHONE ENCOUNTER
Reviewed pre-procedure instructions including medication stops and blood work.    Understanding verbalized. No questions at this time.

## 2025-06-02 DIAGNOSIS — I48.19 PERSISTENT ATRIAL FIBRILLATION (HCC): Chronic | ICD-10-CM

## 2025-06-02 DIAGNOSIS — I48.3 TYPICAL ATRIAL FLUTTER (HCC): ICD-10-CM

## 2025-06-02 DIAGNOSIS — I50.32 CHRONIC HEART FAILURE WITH PRESERVED EJECTION FRACTION (HFPEF) (HCC): ICD-10-CM

## 2025-06-02 DIAGNOSIS — Z01.810 PRE-OPERATIVE CARDIOVASCULAR EXAMINATION: ICD-10-CM

## 2025-06-02 DIAGNOSIS — I48.19 PERSISTENT ATRIAL FIBRILLATION (HCC): Primary | Chronic | ICD-10-CM

## 2025-06-02 LAB
ANION GAP SERPL CALC-SCNC: 10 MMOL/L (ref 7–16)
BASOPHILS # BLD: 0.09 K/UL (ref 0–0.2)
BASOPHILS NFR BLD: 0.9 % (ref 0–2)
BUN SERPL-MCNC: 37 MG/DL (ref 8–23)
CALCIUM SERPL-MCNC: 9.1 MG/DL (ref 8.8–10.2)
CHLORIDE SERPL-SCNC: 103 MMOL/L (ref 98–107)
CO2 SERPL-SCNC: 26 MMOL/L (ref 20–29)
CREAT SERPL-MCNC: 1.44 MG/DL (ref 0.8–1.3)
DIFFERENTIAL METHOD BLD: ABNORMAL
EOSINOPHIL # BLD: 0.4 K/UL (ref 0–0.8)
EOSINOPHIL NFR BLD: 3.9 % (ref 0.5–7.8)
ERYTHROCYTE [DISTWIDTH] IN BLOOD BY AUTOMATED COUNT: 16.1 % (ref 11.9–14.6)
GLUCOSE SERPL-MCNC: 78 MG/DL (ref 70–99)
HCT VFR BLD AUTO: 45.8 % (ref 41.1–50.3)
HGB BLD-MCNC: 14.9 G/DL (ref 13.6–17.2)
IMM GRANULOCYTES # BLD AUTO: 0.06 K/UL (ref 0–0.5)
IMM GRANULOCYTES NFR BLD AUTO: 0.6 % (ref 0–5)
LYMPHOCYTES # BLD: 2.23 K/UL (ref 0.5–4.6)
LYMPHOCYTES NFR BLD: 21.6 % (ref 13–44)
MAGNESIUM SERPL-MCNC: 2.1 MG/DL (ref 1.8–2.4)
MCH RBC QN AUTO: 31.5 PG (ref 26.1–32.9)
MCHC RBC AUTO-ENTMCNC: 32.5 G/DL (ref 31.4–35)
MCV RBC AUTO: 96.8 FL (ref 82–102)
MONOCYTES # BLD: 0.92 K/UL (ref 0.1–1.3)
MONOCYTES NFR BLD: 8.9 % (ref 4–12)
NEUTS SEG # BLD: 6.64 K/UL (ref 1.7–8.2)
NEUTS SEG NFR BLD: 64.1 % (ref 43–78)
NRBC # BLD: 0 K/UL (ref 0–0.2)
PLATELET # BLD AUTO: 342 K/UL (ref 150–450)
PMV BLD AUTO: 10.6 FL (ref 9.4–12.3)
POTASSIUM SERPL-SCNC: 5 MMOL/L (ref 3.5–5.1)
RBC # BLD AUTO: 4.73 M/UL (ref 4.23–5.6)
SODIUM SERPL-SCNC: 139 MMOL/L (ref 136–145)
WBC # BLD AUTO: 10.3 K/UL (ref 4.3–11.1)

## 2025-06-03 NOTE — PROGRESS NOTES
Patient pre-assessment complete for atrial fibrillation ablation scheduled for 25, arrival time 0700. Patient verified using . Patient instructed to bring a list of all home medications on the day of procedure. NPO status reinforced.  Patient instructed to HOLD Farxiga, Eliquis, Flomax, Spironolactone, and Bumex. Instructed they can take all other medications excluding vitamins & supplements. Patient verbalizes understanding of all instructions & denies any questions at this time.      Confirmed x3 day Farxiga hold prior to procedure.

## 2025-06-04 ENCOUNTER — ANESTHESIA EVENT (OUTPATIENT)
Dept: CARDIAC CATH/INVASIVE PROCEDURES | Age: 65
End: 2025-06-04
Payer: COMMERCIAL

## 2025-06-04 RX ORDER — LIDOCAINE HYDROCHLORIDE 10 MG/ML
1 INJECTION, SOLUTION INFILTRATION; PERINEURAL
Status: CANCELLED | OUTPATIENT
Start: 2025-06-04

## 2025-06-04 RX ORDER — SODIUM CHLORIDE 9 MG/ML
INJECTION, SOLUTION INTRAVENOUS PRN
Status: CANCELLED | OUTPATIENT
Start: 2025-06-04

## 2025-06-04 RX ORDER — HYDRALAZINE HYDROCHLORIDE 20 MG/ML
10 INJECTION INTRAMUSCULAR; INTRAVENOUS
Status: CANCELLED | OUTPATIENT
Start: 2025-06-04

## 2025-06-04 RX ORDER — PROCHLORPERAZINE EDISYLATE 5 MG/ML
5 INJECTION INTRAMUSCULAR; INTRAVENOUS
Status: CANCELLED | OUTPATIENT
Start: 2025-06-04

## 2025-06-04 RX ORDER — ONDANSETRON 2 MG/ML
4 INJECTION INTRAMUSCULAR; INTRAVENOUS
Status: CANCELLED | OUTPATIENT
Start: 2025-06-04

## 2025-06-04 RX ORDER — SODIUM CHLORIDE, SODIUM LACTATE, POTASSIUM CHLORIDE, CALCIUM CHLORIDE 600; 310; 30; 20 MG/100ML; MG/100ML; MG/100ML; MG/100ML
INJECTION, SOLUTION INTRAVENOUS CONTINUOUS
Status: CANCELLED | OUTPATIENT
Start: 2025-06-04

## 2025-06-04 RX ORDER — LABETALOL HYDROCHLORIDE 5 MG/ML
10 INJECTION, SOLUTION INTRAVENOUS
Status: CANCELLED | OUTPATIENT
Start: 2025-06-04

## 2025-06-04 RX ORDER — FENTANYL CITRATE 50 UG/ML
100 INJECTION, SOLUTION INTRAMUSCULAR; INTRAVENOUS
Refills: 0 | Status: CANCELLED | OUTPATIENT
Start: 2025-06-04

## 2025-06-04 RX ORDER — NALOXONE HYDROCHLORIDE 0.4 MG/ML
INJECTION, SOLUTION INTRAMUSCULAR; INTRAVENOUS; SUBCUTANEOUS PRN
Status: CANCELLED | OUTPATIENT
Start: 2025-06-04

## 2025-06-04 RX ORDER — OXYCODONE HYDROCHLORIDE 5 MG/1
5 TABLET ORAL
Refills: 0 | Status: CANCELLED | OUTPATIENT
Start: 2025-06-04

## 2025-06-04 RX ORDER — SODIUM CHLORIDE 0.9 % (FLUSH) 0.9 %
5-40 SYRINGE (ML) INJECTION PRN
Status: CANCELLED | OUTPATIENT
Start: 2025-06-04

## 2025-06-04 RX ORDER — MIDAZOLAM HYDROCHLORIDE 2 MG/2ML
2 INJECTION, SOLUTION INTRAMUSCULAR; INTRAVENOUS
Status: CANCELLED | OUTPATIENT
Start: 2025-06-04

## 2025-06-04 RX ORDER — SODIUM CHLORIDE 0.9 % (FLUSH) 0.9 %
5-40 SYRINGE (ML) INJECTION EVERY 12 HOURS SCHEDULED
Status: CANCELLED | OUTPATIENT
Start: 2025-06-04

## 2025-06-05 ENCOUNTER — ANESTHESIA (OUTPATIENT)
Dept: CARDIAC CATH/INVASIVE PROCEDURES | Age: 65
End: 2025-06-05
Payer: COMMERCIAL

## 2025-06-05 ENCOUNTER — HOSPITAL ENCOUNTER (OUTPATIENT)
Age: 65
Setting detail: OUTPATIENT SURGERY
Discharge: HOME | End: 2025-06-05
Attending: INTERNAL MEDICINE | Admitting: INTERNAL MEDICINE
Payer: COMMERCIAL

## 2025-06-05 ENCOUNTER — APPOINTMENT (OUTPATIENT)
Dept: CARDIAC CATH/INVASIVE PROCEDURES | Age: 65
End: 2025-06-05
Attending: INTERNAL MEDICINE
Payer: COMMERCIAL

## 2025-06-05 VITALS
DIASTOLIC BLOOD PRESSURE: 81 MMHG | HEIGHT: 74 IN | HEART RATE: 82 BPM | BODY MASS INDEX: 31.06 KG/M2 | SYSTOLIC BLOOD PRESSURE: 105 MMHG | TEMPERATURE: 98.1 F | WEIGHT: 242 LBS | RESPIRATION RATE: 12 BRPM | OXYGEN SATURATION: 91 %

## 2025-06-05 DIAGNOSIS — I48.19 PERSISTENT ATRIAL FIBRILLATION (HCC): ICD-10-CM

## 2025-06-05 LAB
ACT BLD: 366 SECS (ref 70–128)
ECHO BSA: 2.39 M2
ECHO BSA: 2.39 M2
EKG DIAGNOSIS: NORMAL
EKG Q-T INTERVAL: 394 MS
EKG QRS DURATION: 110 MS
EKG QTC CALCULATION (BAZETT): 476 MS
EKG R AXIS: 72 DEGREES
EKG T AXIS: 6 DEGREES
EKG VENTRICULAR RATE: 88 BPM

## 2025-06-05 PROCEDURE — 2500000003 HC RX 250 WO HCPCS: Performed by: NURSE ANESTHETIST, CERTIFIED REGISTERED

## 2025-06-05 PROCEDURE — 6360000002 HC RX W HCPCS: Performed by: NURSE ANESTHETIST, CERTIFIED REGISTERED

## 2025-06-05 PROCEDURE — 2709999900 HC NON-CHARGEABLE SUPPLY: Performed by: INTERNAL MEDICINE

## 2025-06-05 PROCEDURE — C1730 CATH, EP, 19 OR FEW ELECT: HCPCS | Performed by: INTERNAL MEDICINE

## 2025-06-05 PROCEDURE — 93005 ELECTROCARDIOGRAM TRACING: CPT | Performed by: INTERNAL MEDICINE

## 2025-06-05 PROCEDURE — 93622 COMP EP EVAL L VENTR PAC&REC: CPT | Performed by: INTERNAL MEDICINE

## 2025-06-05 PROCEDURE — 93312 ECHO TRANSESOPHAGEAL: CPT

## 2025-06-05 PROCEDURE — 93656 COMPRE EP EVAL ABLTJ ATR FIB: CPT | Performed by: INTERNAL MEDICINE

## 2025-06-05 PROCEDURE — 2580000003 HC RX 258: Performed by: NURSE ANESTHETIST, CERTIFIED REGISTERED

## 2025-06-05 PROCEDURE — C1893 INTRO/SHEATH, FIXED,NON-PEEL: HCPCS | Performed by: INTERNAL MEDICINE

## 2025-06-05 PROCEDURE — C1759 CATH, INTRA ECHOCARDIOGRAPHY: HCPCS | Performed by: INTERNAL MEDICINE

## 2025-06-05 PROCEDURE — C1894 INTRO/SHEATH, NON-LASER: HCPCS | Performed by: INTERNAL MEDICINE

## 2025-06-05 PROCEDURE — 93655 ICAR CATH ABLTJ DSCRT ARRHYT: CPT | Performed by: INTERNAL MEDICINE

## 2025-06-05 PROCEDURE — 7100000000 HC PACU RECOVERY - FIRST 15 MIN: Performed by: INTERNAL MEDICINE

## 2025-06-05 PROCEDURE — C1732 CATH, EP, DIAG/ABL, 3D/VECT: HCPCS | Performed by: INTERNAL MEDICINE

## 2025-06-05 PROCEDURE — 3700000000 HC ANESTHESIA ATTENDED CARE: Performed by: INTERNAL MEDICINE

## 2025-06-05 PROCEDURE — 3700000001 HC ADD 15 MINUTES (ANESTHESIA): Performed by: INTERNAL MEDICINE

## 2025-06-05 PROCEDURE — C1760 CLOSURE DEV, VASC: HCPCS | Performed by: INTERNAL MEDICINE

## 2025-06-05 PROCEDURE — 7100000001 HC PACU RECOVERY - ADDTL 15 MIN: Performed by: INTERNAL MEDICINE

## 2025-06-05 PROCEDURE — 6360000002 HC RX W HCPCS: Performed by: INTERNAL MEDICINE

## 2025-06-05 PROCEDURE — 2720000010 HC SURG SUPPLY STERILE: Performed by: INTERNAL MEDICINE

## 2025-06-05 PROCEDURE — 85347 COAGULATION TIME ACTIVATED: CPT

## 2025-06-05 PROCEDURE — 93010 ELECTROCARDIOGRAM REPORT: CPT | Performed by: INTERNAL MEDICINE

## 2025-06-05 RX ORDER — HEPARIN SODIUM 1000 [USP'U]/ML
INJECTION, SOLUTION INTRAVENOUS; SUBCUTANEOUS
Status: DISCONTINUED | OUTPATIENT
Start: 2025-06-05 | End: 2025-06-05 | Stop reason: SDUPTHER

## 2025-06-05 RX ORDER — PANTOPRAZOLE SODIUM 40 MG/1
40 TABLET, DELAYED RELEASE ORAL
Qty: 60 TABLET | Refills: 0 | Status: SHIPPED | OUTPATIENT
Start: 2025-06-05

## 2025-06-05 RX ORDER — SUCRALFATE 1 G/1
1 TABLET ORAL 4 TIMES DAILY
Qty: 120 TABLET | Refills: 0 | Status: SHIPPED | OUTPATIENT
Start: 2025-06-05

## 2025-06-05 RX ORDER — HEPARIN SODIUM 200 [USP'U]/100ML
INJECTION, SOLUTION INTRAVENOUS
Status: DISCONTINUED | OUTPATIENT
Start: 2025-06-05 | End: 2025-06-05 | Stop reason: SDUPTHER

## 2025-06-05 RX ORDER — LIDOCAINE HYDROCHLORIDE 20 MG/ML
INJECTION, SOLUTION EPIDURAL; INFILTRATION; INTRACAUDAL; PERINEURAL
Status: DISCONTINUED | OUTPATIENT
Start: 2025-06-05 | End: 2025-06-05 | Stop reason: SDUPTHER

## 2025-06-05 RX ORDER — ROCURONIUM BROMIDE 10 MG/ML
INJECTION, SOLUTION INTRAVENOUS
Status: DISCONTINUED | OUTPATIENT
Start: 2025-06-05 | End: 2025-06-05 | Stop reason: SDUPTHER

## 2025-06-05 RX ORDER — COLCHICINE 0.6 MG/1
0.6 TABLET ORAL DAILY
Qty: 30 TABLET | Refills: 0 | Status: SHIPPED | OUTPATIENT
Start: 2025-06-05

## 2025-06-05 RX ORDER — PROTAMINE SULFATE 10 MG/ML
INJECTION, SOLUTION INTRAVENOUS
Status: DISCONTINUED | OUTPATIENT
Start: 2025-06-05 | End: 2025-06-05 | Stop reason: SDUPTHER

## 2025-06-05 RX ORDER — SODIUM CHLORIDE, SODIUM LACTATE, POTASSIUM CHLORIDE, CALCIUM CHLORIDE 600; 310; 30; 20 MG/100ML; MG/100ML; MG/100ML; MG/100ML
INJECTION, SOLUTION INTRAVENOUS
Status: DISCONTINUED | OUTPATIENT
Start: 2025-06-05 | End: 2025-06-05 | Stop reason: SDUPTHER

## 2025-06-05 RX ORDER — PROPOFOL 10 MG/ML
INJECTION, EMULSION INTRAVENOUS
Status: DISCONTINUED | OUTPATIENT
Start: 2025-06-05 | End: 2025-06-05 | Stop reason: SDUPTHER

## 2025-06-05 RX ORDER — ADENOSINE 3 MG/ML
INJECTION, SOLUTION INTRAVENOUS PRN
Status: DISCONTINUED | OUTPATIENT
Start: 2025-06-05 | End: 2025-06-05 | Stop reason: HOSPADM

## 2025-06-05 RX ORDER — DEXAMETHASONE SODIUM PHOSPHATE 4 MG/ML
INJECTION, SOLUTION INTRA-ARTICULAR; INTRALESIONAL; INTRAMUSCULAR; INTRAVENOUS; SOFT TISSUE
Status: DISCONTINUED | OUTPATIENT
Start: 2025-06-05 | End: 2025-06-05 | Stop reason: SDUPTHER

## 2025-06-05 RX ORDER — ONDANSETRON 2 MG/ML
INJECTION INTRAMUSCULAR; INTRAVENOUS
Status: DISCONTINUED | OUTPATIENT
Start: 2025-06-05 | End: 2025-06-05 | Stop reason: SDUPTHER

## 2025-06-05 RX ORDER — ETOMIDATE 2 MG/ML
INJECTION INTRAVENOUS
Status: DISCONTINUED | OUTPATIENT
Start: 2025-06-05 | End: 2025-06-05 | Stop reason: SDUPTHER

## 2025-06-05 RX ADMIN — ETOMIDATE 14 MG: 2 INJECTION, SOLUTION INTRAVENOUS at 09:30

## 2025-06-05 RX ADMIN — SUGAMMADEX 200 MG: 100 INJECTION, SOLUTION INTRAVENOUS at 10:45

## 2025-06-05 RX ADMIN — ONDANSETRON 4 MG: 2 INJECTION INTRAMUSCULAR; INTRAVENOUS at 10:40

## 2025-06-05 RX ADMIN — ROCURONIUM BROMIDE 30 MG: 10 INJECTION, SOLUTION INTRAVENOUS at 10:07

## 2025-06-05 RX ADMIN — HEPARIN SODIUM 40 ML/HR: 200 INJECTION, SOLUTION INTRAVENOUS at 10:01

## 2025-06-05 RX ADMIN — SODIUM CHLORIDE, SODIUM LACTATE, POTASSIUM CHLORIDE, AND CALCIUM CHLORIDE: 600; 310; 30; 20 INJECTION, SOLUTION INTRAVENOUS at 09:19

## 2025-06-05 RX ADMIN — HEPARIN SODIUM 9000 UNITS: 1000 INJECTION INTRAVENOUS; SUBCUTANEOUS at 10:01

## 2025-06-05 RX ADMIN — PROPOFOL 50 MG: 10 INJECTION, EMULSION INTRAVENOUS at 09:30

## 2025-06-05 RX ADMIN — LIDOCAINE HYDROCHLORIDE 100 MG: 20 INJECTION, SOLUTION EPIDURAL; INFILTRATION; INTRACAUDAL; PERINEURAL at 09:30

## 2025-06-05 RX ADMIN — ROCURONIUM BROMIDE 50 MG: 10 INJECTION, SOLUTION INTRAVENOUS at 09:30

## 2025-06-05 RX ADMIN — HEPARIN SODIUM 9000 UNITS: 1000 INJECTION INTRAVENOUS; SUBCUTANEOUS at 09:57

## 2025-06-05 RX ADMIN — DEXAMETHASONE SODIUM PHOSPHATE 4 MG: 4 INJECTION, SOLUTION INTRAMUSCULAR; INTRAVENOUS at 10:40

## 2025-06-05 RX ADMIN — PROTAMINE SULFATE 100 MG: 10 INJECTION, SOLUTION INTRAVENOUS at 10:44

## 2025-06-05 ASSESSMENT — PAIN - FUNCTIONAL ASSESSMENT: PAIN_FUNCTIONAL_ASSESSMENT: NONE - DENIES PAIN

## 2025-06-05 ASSESSMENT — ENCOUNTER SYMPTOMS: SHORTNESS OF BREATH: 1

## 2025-06-05 ASSESSMENT — COPD QUESTIONNAIRES: CAT_SEVERITY: MILD

## 2025-06-05 ASSESSMENT — LIFESTYLE VARIABLES: SMOKING_STATUS: 1

## 2025-06-05 NOTE — ANESTHESIA POSTPROCEDURE EVALUATION
Department of Anesthesiology  Postprocedure Note    Patient: Tay Conklin  MRN: [id number]  YOB: 1960  Date of evaluation: 6/5/2025    Procedure Summary       Date: 06/05/25 Room / Location: D EP 2 ALL EVENTS / [facility] CARDIAC CATH LAB    Anesthesia Start: 0917 Anesthesia Stop: 1100    Procedure: Ablation A-fib w complete ep study Diagnosis:       Persistent atrial fibrillation (HCC)      (Persistent atrial fibrillation (HCC) [I48.19])    Providers: Marquis Vasquez MD Responsible Provider: Javi Can IV, MD    Anesthesia Type: general ASA Status: 4            Anesthesia Type: No value filed.    Kristin Phase I: Kristin Score: 10    Kristin Phase II:      Anesthesia Post Evaluation    Patient location during evaluation: PACU  Patient participation: complete - patient participated  Level of consciousness: awake and alert  Airway patency: patent  Nausea & Vomiting: no nausea and no vomiting  Cardiovascular status: hemodynamically stable  Respiratory status: acceptable, nonlabored ventilation and spontaneous ventilation  Hydration status: euvolemic  Comments: /79   Pulse 81   Temp 98.1 °F (36.7 °C)   Resp 16   Ht 1.88 m (6' 2\")   Wt 109.8 kg (242 lb)   SpO2 92%   BMI 31.07 kg/m²     Multimodal analgesia pain management approach  Pain management: adequate and satisfactory to patient    There were no known notable events for this encounter.

## 2025-06-05 NOTE — H&P
EOMI  CV: RRR, no M/R/G, normal JVD, normal distal pulses, no KIANA  Pulm: CTAB, no accessory muscle uses, no wheezes, crackles  GI: soft, NT, ND  Neuro: Alert and oriented    Cardiographics    Telemetry:   ECG (Indpendently visualized and interpreted):  Echocardiogram:     Labs:   Recent Labs     06/02/25  1433      K 5.0   MG 2.1   BUN 37*   WBC 10.3   HGB 14.9   HCT 45.8           Assessment:      Principal Problem:    Persistent atrial fibrillation (HCC)  Resolved Problems:    * No resolved hospital problems. *         Plan:   1. Persistent atrial fibrillation failing amiodarone: I had a discussion with the Pt regarding rate and rhythm control strategies, rhythm control strategy treatment options including DCCV, antiarrhythmic therapy, catheter ablation and the combination of the above. I discussed at length the advantages and disadvantages of all treatment strategies.  We discussed the option of cardiac ablation in detail, including discussion of some of the more common, however, very low risks of the procedure including access complications and risks of damage to the heart or surrounding structures.  We discussed the very low risk of esophageal injury which could result in a serious complication. We discussed practices put in place such as esophageal temperature monitoring and reduced energy and ablation time in areas in close proximity to reduce this risk.  A more detailed discussion of possible complications from cardiac ablation will be addressed again during the consent process the day of the procedure.  The patient will also meet with the staff anesthesiologist to discuss some of the same possible risks, as well as other risks, specific to the patient undergoing anesthesia.  Pt will undergo a transesophageal echocardiogram immediately prior to catheter insertion to completely exclude the possibility of left atrial appendage thrombus. We will follow up with patient post hospitalization.

## 2025-06-05 NOTE — ANESTHESIA PRE PROCEDURE
Department of Anesthesiology  Preprocedure Note       Name:  Tay Conklin   Age:  65 y.o.  :  1960                                          MRN:  [id number]         Date:  2025      Surgeon: Surgeon(s):  Marquis Vasquez MD    Procedure: Procedure(s):  Ablation A-fib w complete ep study    Medications prior to admission:   Prior to Admission medications    Medication Sig Start Date End Date Taking? Authorizing Provider   budesonide-formoterol (SYMBICORT) 160-4.5 MCG/ACT AERO Inhale 2 puffs into the lungs 24  Yes ProviderJun MD   Fluticasone-Salmeterol 232-14 MCG/ACT AEPB Inhale 1 puff into the lungs in the morning and at bedtime 24  Yes Iliana Boyer MD   tiotropium (SPIRIVA RESPIMAT) 2.5 MCG/ACT AERS inhaler Inhale 2 puffs into the lungs daily 24  Yes Iliana Boyer MD   bumetanide (BUMEX) 2 MG tablet Take 1 tablet by mouth daily If you notice increased swelling, you can take an extra dose in the afternoon. 3/2/25   Thien Belcher MD   midodrine (PROAMATINE) 10 MG tablet Take 1 tablet by mouth 3 times daily (with meals) You can stop taking or skip a dose if your systolic blood pressure is >120. 3/2/25   Thien Belcher MD   spironolactone (ALDACTONE) 50 MG tablet Take 1 tablet by mouth daily 3/3/25   Thien Belcher MD   tamsulosin (FLOMAX) 0.4 MG capsule Take 1 capsule by mouth daily 3/2/25   Thien Belcher MD   metoprolol succinate (TOPROL XL) 100 MG extended release tablet Take 0.5 tablets by mouth daily Can increase to 100mg if heart rate elevated at home. 3/2/25   Thien Belcher MD   amiodarone (PACERONE) 100 MG tablet Take 1 tablet by mouth every 12 hours 25   Jeremy Broderick, DO   apixaban (ELIQUIS) 5 MG TABS tablet Take 1 tablet by mouth 2 times daily 25   Jeremy Broderick, DO   dilTIAZem (DILACOR XR) 120 MG extended release capsule Take 1 capsule by mouth every morning 24   Jeremy Broderick,

## 2025-06-05 NOTE — PROGRESS NOTES
Ambulated pt in hallway and back to bed. Bilateral groin sites C/D/I without bleeding or hematoma.

## 2025-06-05 NOTE — ANESTHESIA PRE PROCEDURE
Department of Anesthesiology  Preprocedure Note       Name:  Tay Conklin   Age:  65 y.o.  :  1960                                          MRN:  [id number]         Date:  2025      Surgeon: Surgeon(s):  Marquis Vasquez MD    Procedure: Procedure(s):  Ablation A-fib w complete ep study    Medications prior to admission:   Prior to Admission medications    Medication Sig Start Date End Date Taking? Authorizing Provider   budesonide-formoterol (SYMBICORT) 160-4.5 MCG/ACT AERO Inhale 2 puffs into the lungs 24  Yes ProviderJun MD   Fluticasone-Salmeterol 232-14 MCG/ACT AEPB Inhale 1 puff into the lungs in the morning and at bedtime 24  Yes Iliana Boyer MD   tiotropium (SPIRIVA RESPIMAT) 2.5 MCG/ACT AERS inhaler Inhale 2 puffs into the lungs daily 24  Yes Iliana Boyer MD   bumetanide (BUMEX) 2 MG tablet Take 1 tablet by mouth daily If you notice increased swelling, you can take an extra dose in the afternoon. 3/2/25   Thien Belcher MD   midodrine (PROAMATINE) 10 MG tablet Take 1 tablet by mouth 3 times daily (with meals) You can stop taking or skip a dose if your systolic blood pressure is >120. 3/2/25   Thien Belcher MD   spironolactone (ALDACTONE) 50 MG tablet Take 1 tablet by mouth daily 3/3/25   Thien Belcher MD   tamsulosin (FLOMAX) 0.4 MG capsule Take 1 capsule by mouth daily 3/2/25   Thien Belcher MD   metoprolol succinate (TOPROL XL) 100 MG extended release tablet Take 0.5 tablets by mouth daily Can increase to 100mg if heart rate elevated at home. 3/2/25   Thien Belcher MD   amiodarone (PACERONE) 100 MG tablet Take 1 tablet by mouth every 12 hours 25   Jeremy Broderick, DO   apixaban (ELIQUIS) 5 MG TABS tablet Take 1 tablet by mouth 2 times daily 25   Jeremy Broderick, DO   dilTIAZem (DILACOR XR) 120 MG extended release capsule Take 1 capsule by mouth every morning 24   Jeremy Broderick,

## 2025-06-05 NOTE — DISCHARGE INSTRUCTIONS
any bleeding, lie down and apply pressure over the area with a clean town or washcloth.      Signs of infection, such as fever over 100 degrees F within the first 3 weeks post procedure, chills, drainage from the puncture sites, redness, swelling, hot to touch or severe pain.      Lump larger than the size of a quarter near the puncture sites, increasing more painful or seem to be throbbing.      Severe chest pain with deep breath or when leaning forward, or shortness of breath.     Slurred speech, difficulties swallowing, loss of sensation, decrease strength in hands or legs or any other stroke like symptoms.      Severe chest pain or difficulty breathing.          Sedation for a Medical Procedure: Care Instructions     You were given a sedative medication during your visit. While many of the effects will have worn   off before you leave; you may continue to feel some effects for several hours.      Common side effects from sedation include:  Feeling sleepy. (Your doctors and nurses will make sure you are not too sleepy to go home.)  Nausea and vomiting. This usually does not last long.  Feeling tired.     How can you care for yourself at home?  Activity    Don't do anything for 24 hours that requires attention to detail. It takes time for the medicine effects to completely wear off.     Do not make important legal decisions for 24 hours.     Do not sign any legal documents for 24 hours.     Do not drink alcohol today     For your safety, you should not drive or operate heavy machinery for the remainder of the day     Rest when you feel tired. Getting enough sleep will help you recover.      Diet    You can eat your normal diet, unless your doctor gives you other instructions. If your stomach is upset, try clear liquids and bland, low-fat foods like plain toast or rice.     Drink plenty of fluids (unless your doctor tells you not to).     Don't drink alcohol for 24 hours.      Medicines    Be safe with medicines.

## 2025-06-05 NOTE — PROCEDURES
Pre-Electrophysiology Diagnosis  1. Persistent Atrial fibrillation     Procedure Performed  1. EPS afib ablation/pulmonary vein isolation  2. Left atrial pacing recording from the coronary sinus.  3. 3-D Electroanatomical mapping  4. Intracardiac echo  5. Ablation of second arrhythmia  6. LV pacing and recording  7. Transesophageal echo  8. Drug infusion  9. Cardioversion     Anesthesia: General     Estimated Blood Loss: Less than 10 mL     Procedure in Detail:  The patient was brought to the electrophysiology lab in the fasting state. The patient was intubated by anesthesiology and an esophageal temperature probe inserted and advanced to a location directly posterior to the LA at the level of the pulmonary veins.  The esophageal temperature probe was repositioned throughout the case to a location as close the the ablation catheter as possible. If the esophageal temperature increased 0.5 degrees Celsius ablation was stopped until the temperature returned to baseline.  A tranesophageal echocardiogram was performed directly prior to the procedure and was negative for a UBALDO thrombus (see full report in chart).  A Ref-Star CARTO patch was placed, the patient was then prepped and draped in sterile fashion. Venous access was obtained under ultrasound guidance x4 using modified Seldinger technique, with placement of one 8Fr short sidearm sheath and two 8.5 Fr SL-O 63cm long braided sheaths in the right femoral vein and placement of a 10Fr sheath into the left femoral vein.  The patient presented to the EP lab in atrial fibrillation and underwent DCCV with pads across the anterior and posterior chest.  An intra-cardiac echo probe was prepped, and then inserted into an 10 Fr short sheath and used to localize the fossa ovalis and create LA and pulmonary vein anatomy utilizing CARTO Sound technology.  A [employer] Octaray catheter was then inserted into an 8.5 SLO Fr sheath and used to create an electroanatomical map of  Detail Level: Simple

## 2025-06-05 NOTE — PROGRESS NOTES
TRANSFER - IN REPORT:    Verbal report received from SHAWN Ray on Tay Conklin being received from PACU for routine progression of patient care      Report consisted of patient’s Situation, Background, Assessment and Recommendations(SBAR).     Information from the following report(s) SBAR, Kardex, Procedure Summary, MAR, and Recent Results was reviewed with the receiving nurse.    Opportunity for questions and clarification was provided.      Assessment completed upon patient’s arrival to unit and care assumed.

## 2025-06-05 NOTE — PROGRESS NOTES
Patient received to CPRU room # 9  Ambulatory from [facility]. Patient scheduled for A fib ablation today with Dr Vasquez. Procedure reviewed & questions answered, voiced good understanding consent obtained & placed on chart. All medications and medical history reviewed. Will prep patient per orders. Patient & family updated on plan of care.      The patient has a fraility score of 3-MANAGING WELL, based on ambulation.

## 2025-06-06 NOTE — PROGRESS NOTES
DISCHARGE FOLLOW UP PHONE CALL         1. Ask about access site. Have the patient assess for any signs of a hematoma. Ask about any pain at access site.  none        2. Ask the patient if they had experienced any chest pain or shortness of breath.  none    3. If applicable, did patient  Protonix, Carafate and Colchicine.  Yes, no issues      Callback number provided, no further questions at this time.

## 2025-06-27 RX ORDER — PANTOPRAZOLE SODIUM 40 MG/1
80 TABLET, DELAYED RELEASE ORAL
Qty: 180 TABLET | Refills: 1 | OUTPATIENT
Start: 2025-06-27

## 2025-06-27 RX ORDER — COLCHICINE 0.6 MG/1
0.6 TABLET ORAL DAILY
Qty: 90 TABLET | Refills: 1 | OUTPATIENT
Start: 2025-06-27

## 2025-07-02 NOTE — TELEPHONE ENCOUNTER
MEDICATION REFILL REQUEST      Name of Medication:  Bumetanide  Dose:  2 mg  Frequency:  QD  Quantity:  90  Days' supply:  90 with 3 refills      Pharmacy Name/Location:  [facility]-[phone]

## 2025-07-03 RX ORDER — BUMETANIDE 2 MG/1
2 TABLET ORAL DAILY
Qty: 90 TABLET | Refills: 3 | Status: SHIPPED | OUTPATIENT
Start: 2025-07-03

## 2025-07-03 NOTE — TELEPHONE ENCOUNTER
Requested Prescriptions     Pending Prescriptions Disp Refills    bumetanide (BUMEX) 2 MG tablet 90 tablet 3     Sig: Take 1 tablet by mouth daily If you notice increased swelling, you can take an extra dose in the afternoon.      Verified rx. Last OV 01/08/25. Erx to pharm on file.

## 2025-07-07 ENCOUNTER — TELEPHONE (OUTPATIENT)
Age: 65
End: 2025-07-07

## 2025-07-07 RX ORDER — PANTOPRAZOLE SODIUM 40 MG/1
80 TABLET, DELAYED RELEASE ORAL
Qty: 60 TABLET | Refills: 0 | OUTPATIENT
Start: 2025-07-07

## 2025-07-09 DIAGNOSIS — J44.9 CHRONIC OBSTRUCTIVE PULMONARY DISEASE, UNSPECIFIED COPD TYPE (HCC): ICD-10-CM

## 2025-07-09 RX ORDER — FLUTICASONE PROPIONATE AND SALMETEROL 232; 14 UG/1; UG/1
1 POWDER, METERED RESPIRATORY (INHALATION) 2 TIMES DAILY
Qty: 1 EACH | Refills: 11 | Status: SHIPPED | OUTPATIENT
Start: 2025-07-09

## 2025-07-09 NOTE — TELEPHONE ENCOUNTER
Patient Refill Request     Last Office Visit: 12/17/2024 with Dr. Boyer     When they were supposed to follow up: 3 months     Upcoming Office Visit: 07/11/2025 with Dr. Boyer      Refills Requested:Spiriva Respimat     Type of refill: 30  day     Requested Pharmacy: [facility]      Is prescription pended? Yes

## 2025-07-11 ENCOUNTER — OFFICE VISIT (OUTPATIENT)
Dept: PULMONOLOGY | Age: 65
End: 2025-07-11
Payer: COMMERCIAL

## 2025-07-11 VITALS
HEIGHT: 74 IN | RESPIRATION RATE: 18 BRPM | SYSTOLIC BLOOD PRESSURE: 115 MMHG | DIASTOLIC BLOOD PRESSURE: 79 MMHG | WEIGHT: 271 LBS | BODY MASS INDEX: 34.78 KG/M2 | OXYGEN SATURATION: 91 % | TEMPERATURE: 98.3 F | HEART RATE: 71 BPM

## 2025-07-11 DIAGNOSIS — Z87.891 HISTORY OF PRIOR CIGARETTE SMOKING: ICD-10-CM

## 2025-07-11 DIAGNOSIS — G47.33 OSA (OBSTRUCTIVE SLEEP APNEA): ICD-10-CM

## 2025-07-11 DIAGNOSIS — I50.32 CHRONIC HEART FAILURE WITH PRESERVED EJECTION FRACTION (HFPEF) (HCC): ICD-10-CM

## 2025-07-11 DIAGNOSIS — J44.9 CHRONIC OBSTRUCTIVE PULMONARY DISEASE, UNSPECIFIED COPD TYPE (HCC): Primary | ICD-10-CM

## 2025-07-11 DIAGNOSIS — J96.12 CHRONIC RESPIRATORY FAILURE WITH HYPERCAPNIA (HCC): ICD-10-CM

## 2025-07-11 PROCEDURE — G8427 DOCREV CUR MEDS BY ELIG CLIN: HCPCS | Performed by: INTERNAL MEDICINE

## 2025-07-11 PROCEDURE — 3078F DIAST BP <80 MM HG: CPT | Performed by: INTERNAL MEDICINE

## 2025-07-11 PROCEDURE — G8417 CALC BMI ABV UP PARAM F/U: HCPCS | Performed by: INTERNAL MEDICINE

## 2025-07-11 PROCEDURE — 3017F COLORECTAL CA SCREEN DOC REV: CPT | Performed by: INTERNAL MEDICINE

## 2025-07-11 PROCEDURE — 99214 OFFICE O/P EST MOD 30 MIN: CPT | Performed by: INTERNAL MEDICINE

## 2025-07-11 PROCEDURE — 3023F SPIROM DOC REV: CPT | Performed by: INTERNAL MEDICINE

## 2025-07-11 PROCEDURE — G0296 VISIT TO DETERM LDCT ELIG: HCPCS | Performed by: INTERNAL MEDICINE

## 2025-07-11 PROCEDURE — 3074F SYST BP LT 130 MM HG: CPT | Performed by: INTERNAL MEDICINE

## 2025-07-11 PROCEDURE — 1036F TOBACCO NON-USER: CPT | Performed by: INTERNAL MEDICINE

## 2025-07-11 RX ORDER — ALBUTEROL SULFATE 90 UG/1
2 INHALANT RESPIRATORY (INHALATION) EVERY 6 HOURS PRN
Qty: 18 G | Refills: 11 | Status: SHIPPED | OUTPATIENT
Start: 2025-07-11

## 2025-07-11 RX ORDER — ZOSTER VACCINE RECOMBINANT, ADJUVANTED 50 MCG/0.5
0.5 KIT INTRAMUSCULAR SEE ADMIN INSTRUCTIONS
Qty: 0.5 ML | Refills: 0 | Status: SHIPPED | OUTPATIENT
Start: 2025-07-11 | End: 2026-01-07

## 2025-07-11 ASSESSMENT — PATIENT HEALTH QUESTIONNAIRE - PHQ9
SUM OF ALL RESPONSES TO PHQ QUESTIONS 1-9: 0
SUM OF ALL RESPONSES TO PHQ QUESTIONS 1-9: 0
1. LITTLE INTEREST OR PLEASURE IN DOING THINGS: NOT AT ALL
SUM OF ALL RESPONSES TO PHQ QUESTIONS 1-9: 0
SUM OF ALL RESPONSES TO PHQ QUESTIONS 1-9: 0
2. FEELING DOWN, DEPRESSED OR HOPELESS: NOT AT ALL

## 2025-07-11 NOTE — PROGRESS NOTES
syncytial vaccine, adjuvanted (AREXVY) 120 MCG/0.5ML injection 0.5 mLs, IntraMUSCular, ONCE    Shingrix 50 mcg, IntraMUSCular, SEE ADMIN INSTRUCTIONS, 1 dose now and repeat in 2-6 months    tamsulosin (FLOMAX) 0.4 mg, Oral, DAILY    tiotropium (SPIRIVA RESPIMAT) 5 mcg, Inhalation, DAILY RESP   Discussed with the patient the current USPSTF guidelines released March 9, 2021 for screening for lung cancer.    For adults aged 50 to 80 years who have a 20 pack-year smoking history and currently smoke or have quit within the past 15 years the grade B recommendation is to:  Screen for lung cancer with low-dose computed tomography (LDCT) every year.  Stop screening once a person has not smoked for 15 years or has a health problem that limits life expectancy or the ability to have lung surgery.    The patient  reports that he quit smoking about 11 years ago. His smoking use included cigarettes. He started smoking about 48 years ago. He has a 55.5 pack-year smoking history. He has never used smokeless tobacco.. Discussed with patient the risks and benefits of screening, including over-diagnosis, false positive rate, and total radiation exposure.  The patient currently exhibits no signs or symptoms suggestive of lung cancer.  Discussed with patient the importance of compliance with yearly annual lung cancer screenings and willingness to undergo diagnosis and treatment if screening scan is positive.  In addition, the patient was counseled regarding the importance of remaining smoke free and/or total smoking cessation.    Also reviewed the following if the patient has Medicare that as of February 10, 2022, Medicare only covers LDCT screening in patients aged 50-77 with at least a 20 pack-year smoking history who currently smoke or have quit in the last 15 years

## 2025-07-25 ENCOUNTER — RESULTS FOLLOW-UP (OUTPATIENT)
Dept: PULMONOLOGY | Age: 65
End: 2025-07-25

## 2025-07-25 NOTE — TELEPHONE ENCOUNTER
Spoke with the patient in regards to their Overnight results, explained per Dr. Boyer that the CLAIRE showed at least 3 hours of low O2 and he would like to order another CLAIRE on 2 lpm qhs to see if this setting is enough for the patient.  Patient verbalized understanding of the results and was agreeable with a repeat CLAIRE.  Order has been established and sent to [facility] via [employer].  No further questions or concerns were asked at this time.  // Candy CASTRO

## 2025-08-05 ENCOUNTER — OFFICE VISIT (OUTPATIENT)
Age: 65
End: 2025-08-05
Payer: COMMERCIAL

## 2025-08-05 VITALS
HEIGHT: 74 IN | WEIGHT: 265 LBS | SYSTOLIC BLOOD PRESSURE: 110 MMHG | HEART RATE: 71 BPM | BODY MASS INDEX: 34.01 KG/M2 | DIASTOLIC BLOOD PRESSURE: 72 MMHG

## 2025-08-05 DIAGNOSIS — I50.32 CHRONIC HEART FAILURE WITH PRESERVED EJECTION FRACTION (HFPEF) (HCC): Chronic | ICD-10-CM

## 2025-08-05 DIAGNOSIS — I48.19 PERSISTENT ATRIAL FIBRILLATION (HCC): Primary | Chronic | ICD-10-CM

## 2025-08-05 PROCEDURE — 3074F SYST BP LT 130 MM HG: CPT | Performed by: INTERNAL MEDICINE

## 2025-08-05 PROCEDURE — 3078F DIAST BP <80 MM HG: CPT | Performed by: INTERNAL MEDICINE

## 2025-08-05 PROCEDURE — 99214 OFFICE O/P EST MOD 30 MIN: CPT | Performed by: INTERNAL MEDICINE

## 2025-08-05 PROCEDURE — 1036F TOBACCO NON-USER: CPT | Performed by: INTERNAL MEDICINE

## 2025-08-05 PROCEDURE — 3017F COLORECTAL CA SCREEN DOC REV: CPT | Performed by: INTERNAL MEDICINE

## 2025-08-05 PROCEDURE — G8417 CALC BMI ABV UP PARAM F/U: HCPCS | Performed by: INTERNAL MEDICINE

## 2025-08-05 PROCEDURE — G8427 DOCREV CUR MEDS BY ELIG CLIN: HCPCS | Performed by: INTERNAL MEDICINE

## 2025-08-05 RX ORDER — DAPAGLIFLOZIN 10 MG/1
10 TABLET, FILM COATED ORAL EVERY MORNING
Qty: 30 TABLET | Refills: 11 | Status: SHIPPED | OUTPATIENT
Start: 2025-08-05

## 2025-08-05 RX ORDER — METOPROLOL SUCCINATE 100 MG/1
100 TABLET, EXTENDED RELEASE ORAL DAILY
Qty: 30 TABLET | Refills: 11 | Status: SHIPPED | OUTPATIENT
Start: 2025-08-05

## 2025-08-05 ASSESSMENT — ENCOUNTER SYMPTOMS: SHORTNESS OF BREATH: 0

## 2025-08-06 ENCOUNTER — OFFICE VISIT (OUTPATIENT)
Dept: FAMILY MEDICINE CLINIC | Facility: CLINIC | Age: 65
End: 2025-08-06
Payer: COMMERCIAL

## 2025-08-06 VITALS
BODY MASS INDEX: 34.39 KG/M2 | HEIGHT: 74 IN | HEART RATE: 86 BPM | OXYGEN SATURATION: 91 % | DIASTOLIC BLOOD PRESSURE: 78 MMHG | TEMPERATURE: 98.1 F | WEIGHT: 268 LBS | RESPIRATION RATE: 18 BRPM | SYSTOLIC BLOOD PRESSURE: 124 MMHG

## 2025-08-06 DIAGNOSIS — R09.81 NASAL CONGESTION WITH RHINORRHEA: ICD-10-CM

## 2025-08-06 DIAGNOSIS — R09.81 SINUS CONGESTION: Primary | ICD-10-CM

## 2025-08-06 DIAGNOSIS — J34.89 NASAL CONGESTION WITH RHINORRHEA: ICD-10-CM

## 2025-08-06 LAB
INFLUENZA A ANTIGEN, POC: NEGATIVE
INFLUENZA B ANTIGEN, POC: NEGATIVE
LOT EXPIRE DATE: NORMAL
LOT KIT NUMBER: NORMAL
SARS-COV-2 RNA, POC: NEGATIVE
VALID INTERNAL CONTROL: YES
VENDOR AND KIT NAME POC: NORMAL

## 2025-08-06 PROCEDURE — 99213 OFFICE O/P EST LOW 20 MIN: CPT | Performed by: FAMILY MEDICINE

## 2025-08-06 PROCEDURE — 87428 SARSCOV & INF VIR A&B AG IA: CPT | Performed by: FAMILY MEDICINE

## 2025-08-06 PROCEDURE — 3078F DIAST BP <80 MM HG: CPT | Performed by: FAMILY MEDICINE

## 2025-08-06 PROCEDURE — 3017F COLORECTAL CA SCREEN DOC REV: CPT | Performed by: FAMILY MEDICINE

## 2025-08-06 PROCEDURE — G8417 CALC BMI ABV UP PARAM F/U: HCPCS | Performed by: FAMILY MEDICINE

## 2025-08-06 PROCEDURE — 3074F SYST BP LT 130 MM HG: CPT | Performed by: FAMILY MEDICINE

## 2025-08-06 PROCEDURE — G8427 DOCREV CUR MEDS BY ELIG CLIN: HCPCS | Performed by: FAMILY MEDICINE

## 2025-08-06 PROCEDURE — 1036F TOBACCO NON-USER: CPT | Performed by: FAMILY MEDICINE

## 2025-08-06 RX ORDER — BROMPHENIRAMINE MALEATE, PSEUDOEPHEDRINE HYDROCHLORIDE, AND DEXTROMETHORPHAN HYDROBROMIDE 2; 30; 10 MG/5ML; MG/5ML; MG/5ML
5 SYRUP ORAL 4 TIMES DAILY PRN
Qty: 140 ML | Refills: 0 | Status: SHIPPED | OUTPATIENT
Start: 2025-08-06 | End: 2025-08-13

## 2025-08-06 RX ORDER — BENZONATATE 100 MG/1
100 CAPSULE ORAL 3 TIMES DAILY PRN
Qty: 30 CAPSULE | Refills: 0 | Status: SHIPPED | OUTPATIENT
Start: 2025-08-06 | End: 2025-08-16

## 2025-08-06 RX ORDER — GUAIFENESIN 600 MG/1
600 TABLET, EXTENDED RELEASE ORAL 2 TIMES DAILY
Qty: 20 TABLET | Refills: 0 | Status: SHIPPED | OUTPATIENT
Start: 2025-08-06 | End: 2025-08-16

## 2025-08-06 SDOH — ECONOMIC STABILITY: FOOD INSECURITY: WITHIN THE PAST 12 MONTHS, YOU WORRIED THAT YOUR FOOD WOULD RUN OUT BEFORE YOU GOT MONEY TO BUY MORE.: NEVER TRUE

## 2025-08-06 SDOH — ECONOMIC STABILITY: FOOD INSECURITY: WITHIN THE PAST 12 MONTHS, THE FOOD YOU BOUGHT JUST DIDN'T LAST AND YOU DIDN'T HAVE MONEY TO GET MORE.: NEVER TRUE

## 2025-08-06 ASSESSMENT — PATIENT HEALTH QUESTIONNAIRE - PHQ9
1. LITTLE INTEREST OR PLEASURE IN DOING THINGS: NOT AT ALL
2. FEELING DOWN, DEPRESSED OR HOPELESS: NOT AT ALL
SUM OF ALL RESPONSES TO PHQ QUESTIONS 1-9: 0

## 2025-08-25 ENCOUNTER — TELEPHONE (OUTPATIENT)
Age: 65
End: 2025-08-25

## 2025-08-26 PROBLEM — K42.9 UMBILICAL HERNIA WITHOUT OBSTRUCTION OR GANGRENE: Status: ACTIVE | Noted: 2025-08-26

## (undated) DEVICE — TUBING PMP FOR CARTO SYS SMARTABLATE

## (undated) DEVICE — STERILE (15.2 TAPERED TO 7.6 X 183CM) POLYETHYLENE ACCORDION-FOLDED COVER FOR USE WITH SIEMENS ACUNAV ULTRASOUND CATHETER FAMILY CONNECTOR: Brand: SWIFTLINK TRANSDUCER COVER

## (undated) DEVICE — CATHETER US GE COMPATIBILITY SOUNDSTAR ECO 10FR

## (undated) DEVICE — SYSTEM CLOSURE 6-12 FR VEN VASC VASCADE MVP

## (undated) DEVICE — PINNACLE TIF INTRODUCER SHEATH: Brand: PINNACLE

## (undated) DEVICE — BLOCK BITE AD 60FR W/ VELC STRP ADDRESSES MOST PT AND

## (undated) DEVICE — LUBE JELLY FOIL PACK 1.4 OZ: Brand: MEDLINE INDUSTRIES, INC.

## (undated) DEVICE — PATCH REF EXT FOR CARTO 3 SYS (EA = 6 PACKS)

## (undated) DEVICE — PINNACLE INTRODUCER SHEATH: Brand: PINNACLE

## (undated) DEVICE — SYRINGE MEDICAL 3ML CLEAR PLASTIC STANDARD NON CONTROL LUERLOCK TIP DISPOSABLE

## (undated) DEVICE — GAUZE,SPONGE,4"X4",12PLY,WOVEN,NS,LF: Brand: MEDLINE

## (undated) DEVICE — CATHETER REPROC EP F-J BIOBD710FJ282CTR

## (undated) DEVICE — CATHETER MAP F CRV 2-2-2-2-2 MM SPC PERSEID OCTARAY

## (undated) DEVICE — BALLOON US E LIN RNG O KT FOR FG-32UA

## (undated) DEVICE — SHEATH INTRO 50 DEG 0.038 INX180 CM 8.5 FRX63 CM HEARTSPAN

## (undated) DEVICE — PRESSURE MONITORING SET: Brand: TRUWAVE

## (undated) DEVICE — CATHETER EP 7FR L115CM 2-8-2MM SPC TIP 2MM 10 ELECTRD F L

## (undated) DEVICE — Device: Brand: NRG TRANSSEPTAL NEEDLE

## (undated) DEVICE — SYRINGE, LUER SLIP, STERILE, 60ML: Brand: MEDLINE

## (undated) DEVICE — AIRLIFE™ OXYGEN TUBING 7 FEET (2.1 M) CRUSH RESISTANT OXYGEN TUBING, VINYL TIPPED: Brand: AIRLIFE™

## (undated) DEVICE — CATHETER ABLATION QDOT MICRO FJ CURVE BIDIRECTIONAL

## (undated) DEVICE — CONNECTOR TBNG OD5-7MM O2 END DISP

## (undated) DEVICE — 18G NG KIT WITH 96IN PROBE COVER (10 PK): Brand: SITE-RITE

## (undated) DEVICE — YANKAUER,BULB TIP,W/O VENT,RIGID,STERILE: Brand: MEDLINE

## (undated) DEVICE — MOUTHPIECE ENDOSCP L CTRL OPN AND SIDE PORTS DISP

## (undated) DEVICE — SYRINGE MED 10ML LUERLOCK TIP W/O SFTY DISP

## (undated) DEVICE — CANNULA NSL ORAL AD FOR CAPNOFLEX CO2 O2 AIRLFE

## (undated) DEVICE — KENDALL RADIOLUCENT FOAM MONITORING ELECTRODE RECTANGULAR SHAPE: Brand: KENDALL

## (undated) DEVICE — ENDOSCOPIC KIT 1.1+ OP4 CA DE 2 GWN AAMI LEVEL 3

## (undated) DEVICE — SINGLE PORT MANIFOLD: Brand: NEPTUNE 2

## (undated) DEVICE — NEEDLE SYR 18GA L1.5IN RED PLAS HUB S STL BLNT FILL W/O